# Patient Record
Sex: FEMALE | Race: WHITE | Employment: FULL TIME | URBAN - NONMETROPOLITAN AREA
[De-identification: names, ages, dates, MRNs, and addresses within clinical notes are randomized per-mention and may not be internally consistent; named-entity substitution may affect disease eponyms.]

---

## 2018-10-30 ENCOUNTER — OFFICE VISIT (OUTPATIENT)
Dept: VASCULAR SURGERY | Age: 63
End: 2018-10-30
Payer: MEDICAID

## 2018-10-30 ENCOUNTER — TELEPHONE (OUTPATIENT)
Dept: VASCULAR SURGERY | Age: 63
End: 2018-10-30

## 2018-10-30 VITALS
OXYGEN SATURATION: 98 % | TEMPERATURE: 96 F | HEART RATE: 80 BPM | RESPIRATION RATE: 18 BRPM | SYSTOLIC BLOOD PRESSURE: 132 MMHG | DIASTOLIC BLOOD PRESSURE: 83 MMHG

## 2018-10-30 DIAGNOSIS — I70.1 RENAL ARTERY STENOSIS (HCC): ICD-10-CM

## 2018-10-30 DIAGNOSIS — Z01.818 PRE-OP TESTING: Primary | ICD-10-CM

## 2018-10-30 DIAGNOSIS — Z01.818 PRE-OP TESTING: ICD-10-CM

## 2018-10-30 LAB
ANION GAP SERPL CALCULATED.3IONS-SCNC: 15 MMOL/L (ref 7–19)
BUN BLDV-MCNC: 33 MG/DL (ref 8–23)
CALCIUM SERPL-MCNC: 10.8 MG/DL (ref 8.8–10.2)
CHLORIDE BLD-SCNC: 99 MMOL/L (ref 98–111)
CO2: 24 MMOL/L (ref 22–29)
CREAT SERPL-MCNC: 1.3 MG/DL (ref 0.5–0.9)
GFR NON-AFRICAN AMERICAN: 42
GLUCOSE BLD-MCNC: 119 MG/DL (ref 74–109)
HCT VFR BLD CALC: 40.9 % (ref 37–47)
HEMOGLOBIN: 13 G/DL (ref 12–16)
MCH RBC QN AUTO: 26.5 PG (ref 27–31)
MCHC RBC AUTO-ENTMCNC: 31.8 G/DL (ref 33–37)
MCV RBC AUTO: 83.3 FL (ref 81–99)
PDW BLD-RTO: 14.8 % (ref 11.5–14.5)
PLATELET # BLD: 423 K/UL (ref 130–400)
PMV BLD AUTO: 10.7 FL (ref 9.4–12.3)
POTASSIUM SERPL-SCNC: 4.8 MMOL/L (ref 3.5–5)
RBC # BLD: 4.91 M/UL (ref 4.2–5.4)
SODIUM BLD-SCNC: 138 MMOL/L (ref 136–145)
WBC # BLD: 17.3 K/UL (ref 4.8–10.8)

## 2018-10-30 PROCEDURE — 99204 OFFICE O/P NEW MOD 45 MIN: CPT | Performed by: NURSE PRACTITIONER

## 2018-10-30 RX ORDER — ACETYLCYSTEINE 100 MG/ML
SOLUTION ORAL; RESPIRATORY (INHALATION)
Qty: 1 ML | Refills: 0 | OUTPATIENT
Start: 2018-10-30

## 2018-10-30 RX ORDER — GLIMEPIRIDE 4 MG/1
TABLET ORAL
COMMUNITY
Start: 2018-10-25

## 2018-10-30 RX ORDER — ERGOCALCIFEROL 1.25 MG/1
CAPSULE ORAL
COMMUNITY
Start: 2018-10-11 | End: 2018-10-30 | Stop reason: CLARIF

## 2018-10-30 RX ORDER — METOPROLOL TARTRATE 100 MG/1
100 TABLET ORAL
COMMUNITY

## 2018-10-30 RX ORDER — POTASSIUM CHLORIDE 20 MEQ/1
20 TABLET, EXTENDED RELEASE ORAL 2 TIMES DAILY
COMMUNITY

## 2018-10-30 RX ORDER — TRAZODONE HYDROCHLORIDE 100 MG/1
100 TABLET ORAL
COMMUNITY
End: 2018-10-30 | Stop reason: CLARIF

## 2018-10-30 RX ORDER — FUROSEMIDE 40 MG/1
40 TABLET ORAL
COMMUNITY
End: 2018-10-30 | Stop reason: CLARIF

## 2018-10-30 RX ORDER — MONTELUKAST SODIUM 10 MG/1
10 TABLET ORAL
COMMUNITY

## 2018-10-30 RX ORDER — ISOSORBIDE MONONITRATE 30 MG/1
TABLET, EXTENDED RELEASE ORAL
COMMUNITY
Start: 2018-08-30

## 2018-10-30 RX ORDER — TIZANIDINE 4 MG/1
4 TABLET ORAL
COMMUNITY

## 2018-10-30 RX ORDER — FUROSEMIDE 20 MG/1
TABLET ORAL
COMMUNITY
Start: 2018-08-30

## 2018-10-30 RX ORDER — ZOLPIDEM TARTRATE 10 MG/1
10 TABLET ORAL
COMMUNITY

## 2018-10-30 RX ORDER — CLONIDINE HYDROCHLORIDE 0.2 MG/1
0.2 TABLET ORAL
COMMUNITY

## 2018-10-30 RX ORDER — SPIRONOLACTONE 25 MG/1
25 TABLET ORAL
COMMUNITY

## 2018-10-30 RX ORDER — HYDROCHLOROTHIAZIDE 50 MG/1
37.5 TABLET ORAL
COMMUNITY
End: 2018-10-30 | Stop reason: CLARIF

## 2018-10-30 RX ORDER — DICLOFENAC SODIUM 75 MG/1
TABLET, DELAYED RELEASE ORAL
COMMUNITY
Start: 2018-10-25

## 2018-10-30 RX ORDER — ISOSORBIDE MONONITRATE 60 MG/1
TABLET, EXTENDED RELEASE ORAL
COMMUNITY
Start: 2018-10-25 | End: 2018-10-30 | Stop reason: CLARIF

## 2018-10-30 RX ORDER — LOSARTAN POTASSIUM 100 MG/1
100 TABLET ORAL
COMMUNITY

## 2018-10-30 RX ORDER — ATORVASTATIN CALCIUM 10 MG/1
TABLET, FILM COATED ORAL
COMMUNITY
Start: 2018-10-08

## 2018-10-30 RX ORDER — DULOXETIN HYDROCHLORIDE 30 MG/1
30 CAPSULE, DELAYED RELEASE ORAL
COMMUNITY
End: 2018-10-30 | Stop reason: CLARIF

## 2018-10-30 RX ORDER — ALPRAZOLAM 0.25 MG/1
0.25 TABLET ORAL
COMMUNITY
End: 2018-10-30 | Stop reason: CLARIF

## 2018-10-30 RX ORDER — NIFEDIPINE 90 MG/1
90 TABLET, EXTENDED RELEASE ORAL
COMMUNITY

## 2018-10-31 PROBLEM — I70.1 RENAL ARTERY STENOSIS (HCC): Status: ACTIVE | Noted: 2018-10-31

## 2018-10-31 NOTE — PROGRESS NOTES
Patient Care Team:  Chet Salinas MD as PCP - General      History and Physical    She has a history of hypertension and possible renal artery stenosis for a duration of 1 - 5 years. She presents with gradually worsening hypertension. She is currently on 7 antihypertensive medications. Her current treatment includes none. At present she reports symptoms of blurred vision and headache. Risk factors for atherosclerosis reviewed with the patient include tobacco use, hyperlipidemia, coronary artery disease, hypertension, diabetes, peripheral occlusive disease, family history of ASO, ESRD, O2 use and COPD. Differential diagnosis for hypertension includes but is not limited to essential HTN aldosteronoma, pheochromocytoma, renal artery stenosis, aortic coarctation. Lab Results   Component Value Date    BUN 33 (H) 10/30/2018     Lab Results   Component Value Date    CREATININE 1.3 (H) 10/30/2018         Tim Lozada is a 66 y.o. female with the following history reviewed and recorded in [facility]:  Patient Active Problem List    Diagnosis Date Noted    Renal artery stenosis ([address] 10/31/2018    HTN (hypertension)     Diabetes (HCC)      type 2       Current Outpatient Prescriptions   Medication Sig Dispense Refill    cloNIDine (CATAPRES) 0.2 MG tablet Take 0.2 mg by mouth      losartan (COZAAR) 100 MG tablet Take 100 mg by mouth      metoprolol (LOPRESSOR) 100 MG tablet Take 100 mg by mouth      montelukast (SINGULAIR) 10 MG tablet Take 10 mg by mouth      NIFEdipine (PROCARDIA XL) 90 MG extended release tablet Take 90 mg by mouth      spironolactone (ALDACTONE) 25 MG tablet Take 25 mg by mouth      tiZANidine (ZANAFLEX) 4 MG tablet Take 4 mg by mouth      zolpidem (AMBIEN) 10 MG tablet Take 10 mg by mouth. Arty Reap atorvastatin (LIPITOR) 10 MG tablet       diclofenac (VOLTAREN) 75 MG EC tablet       furosemide (LASIX) 20 MG tablet       glimepiride (AMARYL) 4 MG tablet       isosorbide nausea, or vomiting. Genitourinary - No difficulty urinating, dysuria, frequency, or urgency. No flank pain or hematuria. Musculoskeletal - no back pain, gait disturbance, or myalgia. Skin - no color change, rash, pallor, or new wound. Neurologic - no dizziness, facial asymmetry, or light headedness. No seizures. No speech difficulty or lateralizing weakness. Hematologic - no easy bruising or excessive bleeding. Psychiatric - no severe anxiety or nervousness. No confusion. All other review of systems are negative. Physical Exam    /83 Comment: right  Pulse 80   Temp 96 °F (35.6 °C)   Resp 18   SpO2 98%     Constitutional - well developed, well nourished. No diaphoresis or acute distress. HENT - head normocephalic. Right external ear canal appears normal.  Left external ear canal appears normal.  Septum appears midline. Eyes - conjunctiva normal.  EOMS normal.  No exudate. No icterus. Neck- ROM appears normal, no tracheal deviation. Cardiovascular - Regular rate and rhythm. Heart sounds are normal.  No murmur, rub, or gallop. Carotid pulses are 2+ to palpation bilaterally without bruit. Extremities - Radial and brachial pulses are 2+ to palpation bilaterally. Right femoral pulse: present 2+; Right popliteal pulse: absent Right DP: absent; Right PT absent; Left femoral pulse: present 2+; Left popliteal pulse: absent; Left DP: absent; Left PT: absent No cyanosis, clubbing, or significant edema. No signs atheroembolic event. Pulmonary - effort appears normal.  No respiratory distress. Lungs - Breath sounds normal. No wheezes or rales. GI - Abdomen - soft, non tender, bowel sounds X 4 quadrants. No guarding or rebound tenderness. No distension or palpable mass. Genitourinary - deferred. Musculoskeletal - ROM appears normal.  No significant edema. Neurologic - alert and oriented X 3. Physiologic. Skin - warm, dry, and intact. No rash, erythema, or pallor.   Psychiatric

## 2018-11-08 ENCOUNTER — PREP FOR PROCEDURE (OUTPATIENT)
Dept: VASCULAR SURGERY | Age: 63
End: 2018-11-08

## 2018-11-08 ENCOUNTER — TELEPHONE (OUTPATIENT)
Dept: VASCULAR SURGERY | Age: 63
End: 2018-11-08

## 2018-11-20 ENCOUNTER — TELEPHONE (OUTPATIENT)
Dept: VASCULAR SURGERY | Age: 63
End: 2018-11-20

## 2019-01-09 ENCOUNTER — LAB REQUISITION (OUTPATIENT)
Dept: LAB | Facility: HOSPITAL | Age: 64
End: 2019-01-09

## 2019-01-09 DIAGNOSIS — Z00.00 ENCOUNTER FOR GENERAL ADULT MEDICAL EXAMINATION WITHOUT ABNORMAL FINDINGS: ICD-10-CM

## 2019-01-09 LAB
ALBUMIN SERPL-MCNC: 3.8 G/DL (ref 3.5–5)
ALBUMIN/GLOB SERPL: 1.4 G/DL (ref 1.1–2.5)
ALP SERPL-CCNC: 72 U/L (ref 24–120)
ALT SERPL W P-5'-P-CCNC: 30 U/L (ref 0–54)
ANION GAP SERPL CALCULATED.3IONS-SCNC: 12 MMOL/L (ref 4–13)
AST SERPL-CCNC: 14 U/L (ref 7–45)
BILIRUB SERPL-MCNC: 0.6 MG/DL (ref 0.1–1)
BUN BLD-MCNC: 37 MG/DL (ref 5–21)
BUN/CREAT SERPL: 15.4 (ref 7–25)
CALCIUM SPEC-SCNC: 9.6 MG/DL (ref 8.4–10.4)
CHLORIDE SERPL-SCNC: 100 MMOL/L (ref 98–110)
CO2 SERPL-SCNC: 24 MMOL/L (ref 24–31)
CREAT BLD-MCNC: 2.41 MG/DL (ref 0.5–1.4)
FERRITIN SERPL-MCNC: 65.2 NG/ML (ref 11.1–264)
FOLATE SERPL-MCNC: 14.5 NG/ML
GFR SERPL CREATININE-BSD FRML MDRD: 20 ML/MIN/1.73
GLOBULIN UR ELPH-MCNC: 2.7 GM/DL
GLUCOSE BLD-MCNC: 240 MG/DL (ref 70–100)
IRON 24H UR-MRATE: 69 MCG/DL (ref 42–180)
IRON SATN MFR SERPL: 23 % (ref 20–45)
POTASSIUM BLD-SCNC: 5.3 MMOL/L (ref 3.5–5.3)
PROT SERPL-MCNC: 6.5 G/DL (ref 6.3–8.7)
SODIUM BLD-SCNC: 136 MMOL/L (ref 135–145)
TIBC SERPL-MCNC: 298 MCG/DL (ref 225–420)
VIT B12 BLD-MCNC: 452 PG/ML (ref 239–931)

## 2019-01-09 PROCEDURE — 83550 IRON BINDING TEST: CPT | Performed by: INTERNAL MEDICINE

## 2019-01-09 PROCEDURE — 82746 ASSAY OF FOLIC ACID SERUM: CPT | Performed by: INTERNAL MEDICINE

## 2019-01-09 PROCEDURE — 82607 VITAMIN B-12: CPT | Performed by: INTERNAL MEDICINE

## 2019-01-09 PROCEDURE — 80053 COMPREHEN METABOLIC PANEL: CPT | Performed by: INTERNAL MEDICINE

## 2019-01-09 PROCEDURE — 83540 ASSAY OF IRON: CPT | Performed by: INTERNAL MEDICINE

## 2019-01-09 PROCEDURE — 82728 ASSAY OF FERRITIN: CPT | Performed by: INTERNAL MEDICINE

## 2019-02-20 ENCOUNTER — LAB REQUISITION (OUTPATIENT)
Dept: LAB | Facility: HOSPITAL | Age: 64
End: 2019-02-20

## 2019-02-20 DIAGNOSIS — Z00.00 ENCOUNTER FOR GENERAL ADULT MEDICAL EXAMINATION WITHOUT ABNORMAL FINDINGS: ICD-10-CM

## 2019-02-20 LAB
FERRITIN SERPL-MCNC: 49.9 NG/ML (ref 11.1–264)
IRON 24H UR-MRATE: 74 MCG/DL (ref 42–180)
IRON SATN MFR SERPL: 24 % (ref 20–45)
TIBC SERPL-MCNC: 307 MCG/DL (ref 225–420)

## 2019-02-20 PROCEDURE — 83540 ASSAY OF IRON: CPT | Performed by: INTERNAL MEDICINE

## 2019-02-20 PROCEDURE — 83550 IRON BINDING TEST: CPT | Performed by: INTERNAL MEDICINE

## 2019-02-20 PROCEDURE — 82728 ASSAY OF FERRITIN: CPT | Performed by: INTERNAL MEDICINE

## 2019-04-17 ENCOUNTER — LAB REQUISITION (OUTPATIENT)
Dept: LAB | Facility: HOSPITAL | Age: 64
End: 2019-04-17

## 2019-04-17 DIAGNOSIS — Z00.00 ENCOUNTER FOR GENERAL ADULT MEDICAL EXAMINATION WITHOUT ABNORMAL FINDINGS: ICD-10-CM

## 2019-04-17 LAB
FERRITIN SERPL-MCNC: 62.8 NG/ML (ref 11.1–264)
IRON 24H UR-MRATE: 68 MCG/DL (ref 42–180)
IRON SATN MFR SERPL: 24 % (ref 20–45)
TIBC SERPL-MCNC: 283 MCG/DL (ref 225–420)

## 2019-04-17 PROCEDURE — 83550 IRON BINDING TEST: CPT | Performed by: INTERNAL MEDICINE

## 2019-04-17 PROCEDURE — 82728 ASSAY OF FERRITIN: CPT | Performed by: INTERNAL MEDICINE

## 2019-04-17 PROCEDURE — 83540 ASSAY OF IRON: CPT | Performed by: INTERNAL MEDICINE

## 2019-07-17 ENCOUNTER — LAB REQUISITION (OUTPATIENT)
Dept: LAB | Facility: HOSPITAL | Age: 64
End: 2019-07-17

## 2019-07-17 DIAGNOSIS — Z00.00 ENCOUNTER FOR GENERAL ADULT MEDICAL EXAMINATION WITHOUT ABNORMAL FINDINGS: ICD-10-CM

## 2019-07-17 LAB
FERRITIN SERPL-MCNC: 43.8 NG/ML (ref 11.1–264)
IRON 24H UR-MRATE: 64 MCG/DL (ref 42–180)
IRON SATN MFR SERPL: 20 % (ref 20–45)
TIBC SERPL-MCNC: 314 MCG/DL (ref 225–420)

## 2019-07-17 PROCEDURE — 82728 ASSAY OF FERRITIN: CPT | Performed by: INTERNAL MEDICINE

## 2019-07-17 PROCEDURE — 83550 IRON BINDING TEST: CPT | Performed by: INTERNAL MEDICINE

## 2019-07-17 PROCEDURE — 83540 ASSAY OF IRON: CPT | Performed by: INTERNAL MEDICINE

## 2019-08-07 ENCOUNTER — LAB REQUISITION (OUTPATIENT)
Dept: LAB | Facility: HOSPITAL | Age: 64
End: 2019-08-07

## 2019-08-07 DIAGNOSIS — Z00.00 ENCOUNTER FOR GENERAL ADULT MEDICAL EXAMINATION WITHOUT ABNORMAL FINDINGS: ICD-10-CM

## 2019-08-07 LAB
FERRITIN SERPL-MCNC: 59.7 NG/ML (ref 11.1–264)
IRON 24H UR-MRATE: 68 MCG/DL (ref 42–180)
IRON SATN MFR SERPL: 21 % (ref 20–45)
TIBC SERPL-MCNC: 317 MCG/DL (ref 225–420)

## 2019-08-07 PROCEDURE — 83540 ASSAY OF IRON: CPT | Performed by: INTERNAL MEDICINE

## 2019-08-07 PROCEDURE — 82728 ASSAY OF FERRITIN: CPT | Performed by: INTERNAL MEDICINE

## 2019-08-07 PROCEDURE — 83550 IRON BINDING TEST: CPT | Performed by: INTERNAL MEDICINE

## 2019-09-04 ENCOUNTER — LAB (OUTPATIENT)
Dept: ONCOLOGY | Facility: CLINIC | Age: 64
End: 2019-09-04

## 2019-09-04 DIAGNOSIS — D64.9 ANEMIA, UNSPECIFIED TYPE: ICD-10-CM

## 2019-09-04 DIAGNOSIS — D64.9 ANEMIA, UNSPECIFIED TYPE: Primary | ICD-10-CM

## 2019-09-04 LAB
BASOPHILS # BLD AUTO: 0.11 10*3/MM3 (ref 0–0.2)
BASOPHILS NFR BLD AUTO: 1.2 % (ref 0–1.5)
DEPRECATED RDW RBC AUTO: 40.9 FL (ref 37–54)
EOSINOPHIL # BLD AUTO: 0.28 10*3/MM3 (ref 0–0.4)
EOSINOPHIL NFR BLD AUTO: 3 % (ref 0.3–6.2)
ERYTHROCYTE [DISTWIDTH] IN BLOOD BY AUTOMATED COUNT: 13.5 % (ref 12.3–15.4)
FERRITIN SERPL-MCNC: 59.4 NG/ML (ref 11.1–264)
HCT VFR BLD AUTO: 41.5 % (ref 34–46.6)
HGB BLD-MCNC: 13.4 G/DL (ref 12–15.9)
IMM GRANULOCYTES # BLD AUTO: 0.03 10*3/MM3 (ref 0–0.05)
IMM GRANULOCYTES NFR BLD AUTO: 0.3 % (ref 0–0.5)
IRON 24H UR-MRATE: 72 MCG/DL (ref 42–180)
IRON SATN MFR SERPL: 24 % (ref 20–45)
LYMPHOCYTES # BLD AUTO: 2.09 10*3/MM3 (ref 0.7–3.1)
LYMPHOCYTES NFR BLD AUTO: 22.3 % (ref 19.6–45.3)
MCH RBC QN AUTO: 26.3 PG (ref 26.6–33)
MCHC RBC AUTO-ENTMCNC: 32.3 G/DL (ref 31.5–35.7)
MCV RBC AUTO: 81.5 FL (ref 79–97)
MONOCYTES # BLD AUTO: 0.49 10*3/MM3 (ref 0.1–0.9)
MONOCYTES NFR BLD AUTO: 5.2 % (ref 5–12)
NEUTROPHILS # BLD AUTO: 6.36 10*3/MM3 (ref 1.7–7)
NEUTROPHILS NFR BLD AUTO: 68 % (ref 42.7–76)
PLATELET # BLD AUTO: 326 10*3/MM3 (ref 140–450)
PMV BLD AUTO: 10.3 FL (ref 6–12)
RBC # BLD AUTO: 5.09 10*6/MM3 (ref 3.77–5.28)
TIBC SERPL-MCNC: 301 MCG/DL (ref 225–420)
WBC NRBC COR # BLD: 9.36 10*3/MM3 (ref 3.4–10.8)

## 2019-09-04 PROCEDURE — 83540 ASSAY OF IRON: CPT | Performed by: INTERNAL MEDICINE

## 2019-09-04 PROCEDURE — 82728 ASSAY OF FERRITIN: CPT | Performed by: INTERNAL MEDICINE

## 2019-09-04 PROCEDURE — 85025 COMPLETE CBC W/AUTO DIFF WBC: CPT | Performed by: INTERNAL MEDICINE

## 2019-09-04 PROCEDURE — 83550 IRON BINDING TEST: CPT | Performed by: INTERNAL MEDICINE

## 2019-10-02 ENCOUNTER — TELEPHONE (OUTPATIENT)
Dept: BARIATRICS/WEIGHT MGMT | Facility: CLINIC | Age: 64
End: 2019-10-02

## 2019-10-02 ENCOUNTER — OFFICE VISIT (OUTPATIENT)
Dept: CARDIOLOGY | Facility: CLINIC | Age: 64
End: 2019-10-02

## 2019-10-02 VITALS
HEART RATE: 69 BPM | BODY MASS INDEX: 46.95 KG/M2 | SYSTOLIC BLOOD PRESSURE: 132 MMHG | HEIGHT: 63 IN | DIASTOLIC BLOOD PRESSURE: 83 MMHG | WEIGHT: 265 LBS

## 2019-10-02 DIAGNOSIS — R00.1 BRADYCARDIA, SINUS: ICD-10-CM

## 2019-10-02 DIAGNOSIS — E66.01 CLASS 3 SEVERE OBESITY DUE TO EXCESS CALORIES WITH SERIOUS COMORBIDITY AND BODY MASS INDEX (BMI) OF 45.0 TO 49.9 IN ADULT (HCC): ICD-10-CM

## 2019-10-02 DIAGNOSIS — I10 ESSENTIAL HYPERTENSION: ICD-10-CM

## 2019-10-02 DIAGNOSIS — Z01.818 PRE-OP EVALUATION: Primary | ICD-10-CM

## 2019-10-02 DIAGNOSIS — I44.7 LBBB (LEFT BUNDLE BRANCH BLOCK): ICD-10-CM

## 2019-10-02 PROBLEM — E66.813 CLASS 3 SEVERE OBESITY DUE TO EXCESS CALORIES WITH SERIOUS COMORBIDITY AND BODY MASS INDEX (BMI) OF 45.0 TO 49.9 IN ADULT: Status: ACTIVE | Noted: 2019-10-02

## 2019-10-02 PROCEDURE — 99204 OFFICE O/P NEW MOD 45 MIN: CPT | Performed by: INTERNAL MEDICINE

## 2019-10-02 PROCEDURE — 93000 ELECTROCARDIOGRAM COMPLETE: CPT | Performed by: INTERNAL MEDICINE

## 2019-10-02 RX ORDER — NIFEDIPINE 60 MG/1
60 TABLET, FILM COATED, EXTENDED RELEASE ORAL DAILY
COMMUNITY
Start: 2019-10-01

## 2019-10-02 RX ORDER — SPIRONOLACTONE 25 MG/1
50 TABLET ORAL DAILY
COMMUNITY

## 2019-10-02 RX ORDER — CLONIDINE HYDROCHLORIDE 0.2 MG/1
0.2 TABLET ORAL 3 TIMES DAILY
Refills: 2 | COMMUNITY
Start: 2019-09-23

## 2019-10-02 RX ORDER — ZOLPIDEM TARTRATE 10 MG/1
10 TABLET ORAL
Refills: 1 | COMMUNITY
Start: 2019-09-05

## 2019-10-02 RX ORDER — GLIMEPIRIDE 4 MG/1
4 TABLET ORAL DAILY
Refills: 1 | COMMUNITY
Start: 2019-09-03

## 2019-10-02 RX ORDER — ISOSORBIDE MONONITRATE 60 MG/1
60 TABLET, EXTENDED RELEASE ORAL DAILY
COMMUNITY
Start: 2019-10-01 | End: 2019-10-02

## 2019-10-02 RX ORDER — MONTELUKAST SODIUM 10 MG/1
10 TABLET ORAL DAILY
COMMUNITY
Start: 2019-10-01

## 2019-10-02 RX ORDER — CARVEDILOL 25 MG/1
25 TABLET ORAL 2 TIMES DAILY
Qty: 60 TABLET | Refills: 11 | Status: SHIPPED | OUTPATIENT
Start: 2019-10-02

## 2019-10-02 RX ORDER — METOPROLOL TARTRATE 100 MG/1
100 TABLET ORAL 2 TIMES DAILY WITH MEALS
Refills: 4 | COMMUNITY
Start: 2019-08-12 | End: 2019-10-02 | Stop reason: ALTCHOICE

## 2019-10-02 NOTE — PROGRESS NOTES
"Subjective    Damaris Garcias is a 64 y.o. female. Referred by [facility] ortho for pre-op tkr surgery    History of Present Illness     PRE-OP TKR EVAL:  Had previously received clearance by Dr Azevedo but since has had some variability in her hr and bp with documented high-grade bradycardia without light-headedness. Had been told to stop the Metoprolol but noted big rise in her bp so \"now I'm cutting it in half\". She has a sleep disorder but has never been evaluated for sleep apnea. Is active without change in stamina or cp or unusual sob. EKG shows LBBB. Reports extensive cardiac eval in [facility] 2-3 years ago including ECHO and Cardiac Cath and \"no problems found\".    MORBID OBESE:  This appears to be her #1 health issue and she would like to lose wt but has never had professional help with this. Most other current health issues are complications of this problem. She is interested in referral to the bariatric clinid    HTN:  Is controlled on current regimen and she checks TID at home.    CHRONIC LBBB:  \"this was seen on my very first EKG ever and every one since\"      The following portions of the patient's history were reviewed and updated as appropriate: allergies, current medications, past family history, past medical history, past social history, past surgical history and problem list.    Patient Active Problem List   Diagnosis   • Pre-op evaluation   • Class 3 severe obesity due to excess calories with serious comorbidity and body mass index (BMI) of 45.0 to 49.9 in adult (CMS/[facility])   • LBBB (left bundle branch block)   • Essential hypertension   • Bradycardia, sinus       Allergies   Allergen Reactions   • Cephalexin Shortness Of Breath   • Codeine Shortness Of Breath   • Levaquin [Levofloxacin] Anaphylaxis   • Morphine Shortness Of Breath   • Penicillins Shortness Of Breath   • Aspirin Other (See Comments)   • Cetirizine Dizziness   • Ciprofloxacin Other (See Comments)     Was taking zanaflex at the time and there " is a drug interaction   • Gabapentin Swelling   • Keppra [Levetiracetam] Other (See Comments)     VISION LOSS   • Meloxicam Nausea Only   • Vancomycin Other (See Comments)     Vision loss   • Sulfa Antibiotics Rash       Family History   Problem Relation Age of Onset   • Heart disease Mother    • Heart disease Father        Social History     Socioeconomic History   • Marital status:      Spouse name: Not on file   • Number of children: Not on file   • Years of education: Not on file   • Highest education level: Not on file   Tobacco Use   • Smoking status: Former Smoker     Last attempt to quit:      Years since quittin.7   • Smokeless tobacco: Never Used   Substance and Sexual Activity   • Alcohol use: No     Frequency: Never   • Drug use: No   • Sexual activity: Defer         Current Outpatient Medications:   •  CBD (cannabidiol) oral oil, Take  by mouth 2 (Two) Times a Day., Disp: , Rfl:   •  cloNIDine (CATAPRES) 0.2 MG tablet, Take 0.2 mg by mouth 3 (Three) Times a Day., Disp: , Rfl: 2  •  Ertugliflozin L-PyroglutamicAc (STEGLATRO) 5 MG tablet, Take 5 mg by mouth Every Morning., Disp: , Rfl:   •  glimepiride (AMARYL) 4 MG tablet, Take 4 mg by mouth Daily., Disp: , Rfl: 1  •  montelukast (SINGULAIR) 10 MG tablet, Take 10 mg by mouth Daily., Disp: , Rfl:   •  NIFEdipine CC (ADALAT CC) 60 MG 24 hr tablet, Take 60 mg by mouth Daily., Disp: , Rfl:   •  spironolactone (ALDACTONE) 25 MG tablet, Take 50 mg by mouth Daily., Disp: , Rfl:   •  zolpidem (AMBIEN) 10 MG tablet, Take 10 mg by mouth every night at bedtime., Disp: , Rfl: 1  •  carvedilol (COREG) 25 MG tablet, Take 1 tablet by mouth 2 (Two) Times a Day., Disp: 60 tablet, Rfl: 11    Past Surgical History:   Procedure Laterality Date   • APPENDECTOMY     • CARDIAC CATHETERIZATION     • CHOLECYSTECTOMY     • HYSTERECTOMY     • TUBAL ABDOMINAL LIGATION     • TUMOR REMOVAL         Review of Systems   Constitutional: Positive for fatigue. Negative for  "fever and unexpected weight change.   HENT: Negative for congestion.    Eyes: Negative for visual disturbance.   Respiratory: Positive for shortness of breath. Negative for apnea and wheezing.    Cardiovascular: Negative for chest pain, palpitations and leg swelling.   Gastrointestinal: Negative for abdominal pain and vomiting.   Endocrine: Negative for cold intolerance and heat intolerance.   Genitourinary: Negative for difficulty urinating.   Musculoskeletal: Positive for arthralgias and back pain. Negative for myalgias.   Skin: Negative for rash.   Neurological: Positive for weakness. Negative for syncope and light-headedness.   Hematological: Does not bruise/bleed easily.   Psychiatric/Behavioral: Positive for sleep disturbance.       /83   Pulse 69   Ht 160 cm (63\")   Wt 120 kg (265 lb)   BMI 46.94 kg/m²   Procedures    Objective   Physical Exam   Constitutional: She is oriented to person, place, and time. No distress.   Morbid obese   HENT:   Head: Normocephalic.   Eyes: Pupils are equal, round, and reactive to light.   Neck: No thyromegaly present.   Cardiovascular: Normal rate, regular rhythm and intact distal pulses. Exam reveals no gallop and no friction rub.   Murmur heard.   Systolic murmur is present with a grade of 1/6.  Eusebio at base   Pulmonary/Chest: Effort normal and breath sounds normal. No stridor. No respiratory distress. She has no wheezes. She has no rales.   Abdominal: Soft. Bowel sounds are normal. There is no tenderness. There is no guarding.   Musculoskeletal: She exhibits no edema or tenderness.   Neurological: She is alert and oriented to person, place, and time.   Skin: Skin is warm and dry. She is not diaphoretic.   Psychiatric: She has a normal mood and affect.       Assessment/Plan   Damaris was seen today for surgery risk assessment for tkr, cardiomyopathy and slow heart rate.    Diagnoses and all orders for this visit:    Pre-op evaluation  Comments:  prob low risk but will " obtain further testing  Orders:  -     ECG 12 Lead    Class 3 severe obesity due to excess calories with serious comorbidity and body mass index (BMI) of 45.0 to 49.9 in adult (CMS/[facility])  Comments:  referral to Bariatrics  Orders:  -     Ambulatory Referral to Bariatric Surgery  -     Overnight Sleep Oximetry Study; Future    LBBB (left bundle branch block)  Comments:  chronic and possibly congenital    Essential hypertension  Comments:  controlled - change Metoprolol to Carvedilol and cont home monitoring  Orders:  -     carvedilol (COREG) 25 MG tablet; Take 1 tablet by mouth 2 (Two) Times a Day.    Bradycardia, sinus  Comments:  by report - check Holter, overnight pulse ox  Orders:  -     Holter Monitor - 48 Hour; Future                 Return in about 4 weeks (around 10/30/2019) for Next scheduled follow up with apc.  Orders Placed This Encounter   Procedures   • Ambulatory Referral to Bariatric Surgery     Referral Priority:   Routine     Referral Type:   Consultation     Referral Reason:   Specialty Services Required     Requested Specialty:   Bariatrics     Number of Visits Requested:   1   • Overnight Sleep Oximetry Study     Standing Status:   Future     Standing Expiration Date:   10/1/2020   • Holter Monitor - 48 Hour     Standing Status:   Future     Standing Expiration Date:   10/1/2020     Order Specific Question:   Reason for exam?     Answer:   Other     Order Specific Question:   Other reason?     Answer:   bradycardia   • ECG 12 Lead     Order Specific Question:   Reason for Exam:     Answer:   DIGNA,CARDIOMYOPATHY.

## 2019-10-04 DIAGNOSIS — D64.9 ANEMIA, UNSPECIFIED TYPE: Primary | ICD-10-CM

## 2019-10-09 PROBLEM — N18.30 ANEMIA DUE TO STAGE 3 CHRONIC KIDNEY DISEASE: Status: ACTIVE | Noted: 2019-10-09

## 2019-10-09 PROBLEM — D63.1 ANEMIA DUE TO STAGE 3 CHRONIC KIDNEY DISEASE: Status: ACTIVE | Noted: 2019-10-09

## 2019-10-14 ENCOUNTER — TELEPHONE (OUTPATIENT)
Dept: BARIATRICS/WEIGHT MGMT | Facility: CLINIC | Age: 64
End: 2019-10-14

## 2019-10-24 ENCOUNTER — TELEPHONE (OUTPATIENT)
Dept: BARIATRICS/WEIGHT MGMT | Facility: CLINIC | Age: 64
End: 2019-10-24

## 2019-10-24 ENCOUNTER — LAB (OUTPATIENT)
Dept: ONCOLOGY | Facility: CLINIC | Age: 64
End: 2019-10-24

## 2019-10-24 DIAGNOSIS — D64.9 ANEMIA, UNSPECIFIED TYPE: Primary | ICD-10-CM

## 2019-10-24 DIAGNOSIS — D64.9 ANEMIA, UNSPECIFIED TYPE: ICD-10-CM

## 2019-10-24 LAB
ALBUMIN SERPL-MCNC: 3.8 G/DL (ref 3.5–5.2)
ALBUMIN/GLOB SERPL: 1.3 G/DL
ALP SERPL-CCNC: 88 U/L (ref 39–117)
ALT SERPL W P-5'-P-CCNC: 19 U/L (ref 1–33)
ANION GAP SERPL CALCULATED.3IONS-SCNC: 13 MMOL/L (ref 5–15)
AST SERPL-CCNC: 14 U/L (ref 1–32)
BASOPHILS # BLD AUTO: 0.07 10*3/MM3 (ref 0–0.2)
BASOPHILS NFR BLD AUTO: 0.8 % (ref 0–1.5)
BILIRUB SERPL-MCNC: 0.4 MG/DL (ref 0.2–1.2)
BUN BLD-MCNC: 20 MG/DL (ref 8–23)
BUN/CREAT SERPL: 27.4 (ref 7–25)
CALCIUM SPEC-SCNC: 9.3 MG/DL (ref 8.6–10.5)
CHLORIDE SERPL-SCNC: 98 MMOL/L (ref 98–107)
CO2 SERPL-SCNC: 26 MMOL/L (ref 22–29)
CREAT BLD-MCNC: 0.73 MG/DL (ref 0.57–1)
DEPRECATED RDW RBC AUTO: 42.4 FL (ref 37–54)
EOSINOPHIL # BLD AUTO: 0.23 10*3/MM3 (ref 0–0.4)
EOSINOPHIL NFR BLD AUTO: 2.6 % (ref 0.3–6.2)
ERYTHROCYTE [DISTWIDTH] IN BLOOD BY AUTOMATED COUNT: 13.8 % (ref 12.3–15.4)
FERRITIN SERPL-MCNC: 93.41 NG/ML (ref 13–150)
GFR SERPL CREATININE-BSD FRML MDRD: 81 ML/MIN/1.73
GLOBULIN UR ELPH-MCNC: 3 GM/DL
GLUCOSE BLD-MCNC: 335 MG/DL (ref 65–99)
HCT VFR BLD AUTO: 40.8 % (ref 34–46.6)
HGB BLD-MCNC: 13.1 G/DL (ref 12–15.9)
IMM GRANULOCYTES # BLD AUTO: 0.01 10*3/MM3 (ref 0–0.05)
IMM GRANULOCYTES NFR BLD AUTO: 0.1 % (ref 0–0.5)
IRON 24H UR-MRATE: 57 MCG/DL (ref 37–145)
IRON SATN MFR SERPL: 17 % (ref 20–50)
LYMPHOCYTES # BLD AUTO: 2.55 10*3/MM3 (ref 0.7–3.1)
LYMPHOCYTES NFR BLD AUTO: 28.5 % (ref 19.6–45.3)
MCH RBC QN AUTO: 27 PG (ref 26.6–33)
MCHC RBC AUTO-ENTMCNC: 32.1 G/DL (ref 31.5–35.7)
MCV RBC AUTO: 84 FL (ref 79–97)
MONOCYTES # BLD AUTO: 0.58 10*3/MM3 (ref 0.1–0.9)
MONOCYTES NFR BLD AUTO: 6.5 % (ref 5–12)
NEUTROPHILS # BLD AUTO: 5.51 10*3/MM3 (ref 1.7–7)
NEUTROPHILS NFR BLD AUTO: 61.5 % (ref 42.7–76)
PLATELET # BLD AUTO: 257 10*3/MM3 (ref 140–450)
PMV BLD AUTO: 10.1 FL (ref 6–12)
POTASSIUM BLD-SCNC: 4.2 MMOL/L (ref 3.5–5.2)
PROT SERPL-MCNC: 6.8 G/DL (ref 6–8.5)
RBC # BLD AUTO: 4.86 10*6/MM3 (ref 3.77–5.28)
SODIUM BLD-SCNC: 137 MMOL/L (ref 136–145)
TIBC SERPL-MCNC: 326 MCG/DL (ref 298–536)
TRANSFERRIN SERPL-MCNC: 219 MG/DL (ref 200–360)
WBC NRBC COR # BLD: 8.95 10*3/MM3 (ref 3.4–10.8)

## 2019-10-24 PROCEDURE — 82728 ASSAY OF FERRITIN: CPT | Performed by: INTERNAL MEDICINE

## 2019-10-24 PROCEDURE — 80053 COMPREHEN METABOLIC PANEL: CPT | Performed by: INTERNAL MEDICINE

## 2019-10-24 PROCEDURE — 85025 COMPLETE CBC W/AUTO DIFF WBC: CPT | Performed by: INTERNAL MEDICINE

## 2019-10-24 PROCEDURE — 84466 ASSAY OF TRANSFERRIN: CPT | Performed by: INTERNAL MEDICINE

## 2019-10-24 PROCEDURE — 83540 ASSAY OF IRON: CPT | Performed by: INTERNAL MEDICINE

## 2019-10-24 NOTE — TELEPHONE ENCOUNTER
Patient's new patient packet was returned to our office stating it was not deliverable as addressed. I left her a message to please call the office so I could verify her address.

## 2019-10-24 NOTE — PROGRESS NOTES
[facility]  [address] [place] [address] [address]  [facility] [address]  [phone]    Patient Name: Damaris Garcias  Encounter Date: 10/16/2019  YOB: 1955  Patient Number: [id number]      REASON FOR FOLLOW-UP:  Ms. Damaris Cazares is a pleasant 64-year-old  female who is seen for normocytic anemia from iron deficiency.  She is intolerant to oral iron, severe abdominal cramps.  She had 1 week of oral iron.  She had Nulecit 7.5 months ago.  She is seen alone. History is obtained from the patient.  History is considered to be accurate.      Problem List Items Addressed This Visit        Genitourinary    Anemia due to stage 3 chronic kidney disease (CMS/HCC) - Primary    Overview     DIAGNOSTIC ABNORMALITIES:   CBC 07/24/2018 revealed a WBC of 10.8, hemoglobin 14.3, hematocrit 43.7, MCV 83.4, platelet count 321,000 with normal differential.  CBC 11/05/2018 revealed a WBC of 14.3, hemoglobin 12.19, hematocrit 39.7, MCV 85, platelet count 364,000 with ANC of 8.4, and monocytosis of 0.99.  CBC 11/20/2018 revealed a WBC of 11.3, hemoglobin 11.7, hematocrit 35.6, MCV 85.6, platelet count 313,000 with normal differential.  CBC 11/28/2018 revealed a WBC of 12.9, hemoglobin 11.9, hematocrit 36.5, MCV 84.9, platelet count 371,000 with ANC of 7.9.  CBC  12/10/2018 revealed a WBC of 11.1, hemoglobin 11.9, hematocrit 37.1, MCV 85.7, platelet count 365,000 with normal differential.      PREVIOUS INTERVENTIONS:  Ferrous sulfate 325 mg 02/12/2019 through 02/18/2019.  Stopped due to severe abdominal cramps.  Nulecit 125 mg 05/05/2019 and 03/12/2019 at [facility].   Third dose cancelled due to flu.         Relevant Orders    Occult Blood X 3, Stool - Stool, Per Rectum    CBC & Differential    Comprehensive Metabolic Panel    Ferritin    Iron Profile         No history exists.       PAST MEDICAL HISTORY:  ALLERGIES:  Allergies   Allergen Reactions   •  Cephalexin Shortness Of Breath   • Codeine Shortness Of Breath   • Levaquin [Levofloxacin] Anaphylaxis   • Morphine Shortness Of Breath   • Penicillins Shortness Of Breath   • Aspirin Other (See Comments)   • Cetirizine Dizziness   • Ciprofloxacin Other (See Comments)     Was taking zanaflex at the time and there is a drug interaction   • Gabapentin Swelling   • Keppra [Levetiracetam] Other (See Comments)     VISION LOSS   • Meloxicam Nausea Only   • Vancomycin Other (See Comments)     Vision loss   • Sulfa Antibiotics Rash     CURRENT MEDICATIONS:  Outpatient Encounter Medications as of 10/30/2019   Medication Sig Dispense Refill   • carvedilol (COREG) 25 MG tablet Take 1 tablet by mouth 2 (Two) Times a Day. 60 tablet 11   • CBD (cannabidiol) oral oil Take  by mouth 2 (Two) Times a Day.     • cloNIDine (CATAPRES) 0.2 MG tablet Take 0.2 mg by mouth 3 (Three) Times a Day.  2   • Ertugliflozin L-PyroglutamicAc (STEGLATRO) 5 MG tablet Take 5 mg by mouth Every Morning.     • glimepiride (AMARYL) 4 MG tablet Take 4 mg by mouth Daily.  1   • montelukast (SINGULAIR) 10 MG tablet Take 10 mg by mouth Daily.     • NIFEdipine CC (ADALAT CC) 60 MG 24 hr tablet Take 60 mg by mouth Daily.     • spironolactone (ALDACTONE) 25 MG tablet Take 50 mg by mouth Daily.     • zolpidem (AMBIEN) 10 MG tablet Take 10 mg by mouth every night at bedtime.  1     No facility-administered encounter medications on file as of 10/30/2019.      ADULT ILLNESSES:  Patient Active Problem List   Diagnosis Code   • Pre-op evaluation Z01.818   • Class 3 severe obesity due to excess calories with serious comorbidity and body mass index (BMI) of 45.0 to 49.9 in adult (CMS/[facility]) E66.01, Z68.42   • LBBB (left bundle branch block) I44.7   • Essential hypertension I10   • Bradycardia, sinus R00.1   • Anemia due to stage 3 chronic kidney disease (CMS/[facility]) N18.3, D63.1     SURGERIES:  Past Surgical History:   Procedure Laterality Date   • APPENDECTOMY     • CARDIAC  CATHETERIZATION     • CHOLECYSTECTOMY     • HYSTERECTOMY     • TUBAL ABDOMINAL LIGATION     • TUMOR REMOVAL       HEALTH MAINTENANCE ITEMS:  Health Maintenance Due   Topic Date Due   • MAMMOGRAM  1955   • ANNUAL PHYSICAL  1960   • PNEUMOCOCCAL VACCINE (19-64 MEDIUM RISK) (1 of 1 - PPSV23) 1976   • TDAP/TD VACCINES (1 - Tdap) 1976   • ZOSTER VACCINE (1 of 2) 2007   • INFLUENZA VACCINE  2019   • HEPATITIS C SCREENING  2019   • PAP SMEAR  2019   • COLONOSCOPY  2019       <no information>  Last Completed Colonoscopy       Status Date      COLONOSCOPY No completions recorded          There is no immunization history on file for this patient.  Last Completed Mammogram       Status Date      MAMMOGRAM No completions recorded            FAMILY HISTORY:  Family History   Problem Relation Age of Onset   • Heart disease Mother    • Heart disease Father      SOCIAL HISTORY:  Social History     Socioeconomic History   • Marital status:      Spouse name: Not on file   • Number of children: Not on file   • Years of education: Not on file   • Highest education level: Not on file   Tobacco Use   • Smoking status: Former Smoker     Last attempt to quit:      Years since quittin.8   • Smokeless tobacco: Never Used   Substance and Sexual Activity   • Alcohol use: No     Frequency: Never   • Drug use: No   • Sexual activity: Defer       REVIEW OF SYSTEMS:    Review of Systems   Constitutional: Negative for chills, diaphoresis, fatigue and fever.   HENT: Negative for congestion, mouth sores, nosebleeds and trouble swallowing.    Eyes: Negative for double vision, redness and visual disturbance.   Respiratory: Negative for chest tightness, shortness of breath, wheezing and stridor.    Cardiovascular: Negative for chest pain, palpitations and leg swelling.   Gastrointestinal: Negative for abdominal distention, abdominal pain, blood in stool, constipation, diarrhea, nausea  "and vomiting.   Endocrine: Negative for cold intolerance and heat intolerance.   Genitourinary: Negative for breast lump, difficulty urinating, flank pain, hematuria and vaginal bleeding.   Musculoskeletal: Negative for back pain and joint swelling.        \"Right knee joint hurts when I walk.\"   Skin: Negative for pallor and skin lesions.   Allergic/Immunologic: Positive for food allergies.        \"I can't have blue cheese.\"   Neurological: Negative for seizures, facial asymmetry, speech difficulty and confusion.   Hematological: Negative for adenopathy. Does not bruise/bleed easily.   Psychiatric/Behavioral: Negative for agitation, behavioral problems and hallucinations. The patient is not nervous/anxious.        VITAL SIGNS: /64   Pulse 64   Temp 97.4 °F (36.3 °C)   Resp 18   Ht 160 cm (63\")   Wt 120 kg (264 lb)   SpO2 98%   Breastfeeding? No   BMI 46.77 kg/m²  Body surface area is 2.18 meters squared.   Pain Score    10/30/19 1332   PainSc: 0-No pain       PHYSICAL EXAMINATION:     Physical Exam   Constitutional: She is oriented to person, place, and time. She appears well-developed and well-nourished. No distress.   HENT:   Head: Normocephalic and atraumatic.   Eyes: EOM are normal. Pupils are equal, round, and reactive to light. No scleral icterus.   Neck: Trachea normal. Neck supple.   Cardiovascular: Normal rate, regular rhythm and normal pulses.   No murmur heard.  Pulmonary/Chest: Effort normal and breath sounds normal. She has no wheezes. She has no rhonchi. She has no rales. Chest wall is not dull to percussion.   Abdominal: Soft. Normal appearance and bowel sounds are normal. She exhibits no distension. There is no tenderness. There is no rebound and no guarding.   Belly is obese.   Musculoskeletal: She exhibits no edema.   Lymphadenopathy:     She has no cervical adenopathy.     She has no axillary adenopathy.        Right: No inguinal and no supraclavicular adenopathy present.        " Left: No inguinal and no supraclavicular adenopathy present.   Neurological: She is alert and oriented to person, place, and time. She has normal strength. No sensory deficit.   Skin: Skin is warm and dry. She is not diaphoretic. No pallor.   Psychiatric: She has a normal mood and affect. Her behavior is normal. Judgment and thought content normal.   Vitals reviewed.      LABS    Lab Results - Last 18 Months   Lab Units 10/24/19  1355 09/04/19  1309 10/30/18  1210   HEMOGLOBIN g/dL 13.1 13.4 13.0   HEMATOCRIT % 40.8 41.5 40.9   MCV fL 84.0 81.5 83.3   WBC 10*3/mm3 8.95 9.36 17.3*   RDW % 13.8 13.5 14.8*   MPV fL 10.1 10.3 10.7   PLATELETS 10*3/mm3 257 326 423*   IMM GRAN % % 0.1 0.3  --    NEUTROS ABS 10*3/mm3 5.51 6.36  --    LYMPHS ABS 10*3/mm3 2.55 2.09  --    MONOS ABS 10*3/mm3 0.58 0.49  --    EOS ABS 10*3/mm3 0.23 0.28  --    BASOS ABS 10*3/mm3 0.07 0.11  --    IMMATURE GRANS (ABS) 10*3/mm3 0.01 0.03  --        Lab Results - Last 18 Months   Lab Units 10/24/19  1355 01/09/19  1600 10/30/18  1210   GLUCOSE mg/dL 335* 240* 119*   SODIUM mmol/L 137 136 138   POTASSIUM mmol/L 4.2 5.3 4.8   TOTAL CO2 mmol/L  --   --  24   CO2 mmol/L 26.0 24.0  --    CHLORIDE mmol/L 98 100 99   ANION GAP mmol/L 13.0 12.0 15   CREATININE mg/dL 0.73 2.41* 1.3*   BUN mg/dL 20 37* 33*   BUN / CREAT RATIO  27.4* 15.4  --    CALCIUM mg/dL 9.3 9.6 10.8*   EGFR IF NONAFRICN AM mL/min/1.73 81 20* 42*   ALK PHOS U/L 88 72  --    TOTAL PROTEIN g/dL 6.8 6.5  --    ALT (SGPT) U/L 19 30  --    AST (SGOT) U/L 14 14  --    BILIRUBIN mg/dL 0.4 0.6  --    ALBUMIN g/dL 3.80 3.80  --    GLOBULIN gm/dL 3.0 2.7  --        No results for input(s): MSPIKE, KAPPALAMB, IGLFLC, URICACID, FREEKAPPAL, CEA, LDH, REFLABREPO in the last 26607 hours.    Lab Results - Last 18 Months   Lab Units 10/24/19  1355 09/04/19  1309 08/07/19  1700 07/17/19  1600 04/17/19  1700 02/20/19  1600 01/09/19  1600   IRON mcg/dL 57 72 68 64 68 74 69   TIBC mcg/dL 326 301 317 314 729  307 298   IRON SATURATION % 17* 24 21 20 24 24 23   FERRITIN ng/mL 93.41 59.40 59.70 43.80 62.80 49.90 65.20   FOLATE ng/mL  --   --   --   --   --   --  14.50         Damaris Garcias reports a pain score of 0.        Patient's Body mass index is 46.77 kg/m². BMI is above normal parameters. Recommendations include: referral to primary care.      ASSESSMENT:  1.    Microcytic anemia from iron deficiency and chronic kidney disease stage IV, GFR 20 ml/minute 01/09/2019.  2.    Chronic kidney disease stage IV, GFR 20 ml/minute 01/09/2019.  3.    Iron deficiency, intolerant to oral iron.  Abdominal cramps.   4.    Hypertension.  5.    Peripheral neuropathy from diabetes.  6.    Leukocytosis entertaining reactive process. Not apparent 01/09/2019      PLAN:  1.     Re:  Note of Dr. Mensah 10/02/2019.  Clearance before total knee replacement, right.   2.     Re:  Heme status. Unremarkable.  3.     Re:  Pre office CMP.  Glucose 335 mg/dl and GFR 81 ml/minute.    4.     Re:  Pre office ferritin, TIBC, % saturation, and iron.  17% saturation and ferritin 93.41.   5.    Stool for occult blood x3.   6.    CBC with differential every 8 weeks.  7.    Blood for ferritin, TIBC, % saturation, and iron every 8 weeks.  8.    Continue currently identified medications.  9.    Continue ongoing management per primary care physician and other specialists.  10.  Plan of care discussed with patient.  Understanding expressed.  Patient agreeable to proceed.  11.  Procrit 40,000 units subcutaneously every week if hemoglobin less than 10 and hematocrit less than 30% to target a hemoglobin of 11 and hematocrit of 33% if GFR below 60.  Move CBC with differential every week if she starts Procrit.  Observe for adverse events.   12.  Refer to PCP for obesity BMI 46.7.   12.  Return to the office in 4 months with pre-office CMP.    13.  For the use of ESAs:   the patient about the appropriate use of ESAs for patients  with cancer, MDS, and CKD.  Acknowledge that the AB risk: benefit discussion occurred using the AB [facility] Patient and Healthcare Professional (HCP) Acknowledgement Form.  Assure signature on the form.  Acknowledge government oversight and intervention in the care of the individual patient.      TIME SPENT:  Face to face time on this encounter,as defined by the American Medical Association in the 2019 Current Procedural Terminology codebook; assessment, record review, lab review, planning and education is 31 minutes.      cc: MD Virginia Fried APRN (referring)         Franklyn Mensah MD

## 2019-10-30 ENCOUNTER — OFFICE VISIT (OUTPATIENT)
Dept: ONCOLOGY | Facility: CLINIC | Age: 64
End: 2019-10-30

## 2019-10-30 VITALS
TEMPERATURE: 97.4 F | HEART RATE: 64 BPM | DIASTOLIC BLOOD PRESSURE: 64 MMHG | SYSTOLIC BLOOD PRESSURE: 128 MMHG | RESPIRATION RATE: 18 BRPM | WEIGHT: 264 LBS | BODY MASS INDEX: 46.78 KG/M2 | HEIGHT: 63 IN | OXYGEN SATURATION: 98 %

## 2019-10-30 DIAGNOSIS — N18.30 ANEMIA DUE TO STAGE 3 CHRONIC KIDNEY DISEASE (HCC): Primary | ICD-10-CM

## 2019-10-30 DIAGNOSIS — D63.1 ANEMIA DUE TO STAGE 3 CHRONIC KIDNEY DISEASE (HCC): Primary | ICD-10-CM

## 2019-10-30 PROCEDURE — 99214 OFFICE O/P EST MOD 30 MIN: CPT | Performed by: INTERNAL MEDICINE

## 2019-11-14 ENCOUNTER — OFFICE VISIT (OUTPATIENT)
Dept: CARDIOLOGY | Facility: CLINIC | Age: 64
End: 2019-11-14

## 2019-11-14 DIAGNOSIS — Z53.21 PATIENT LEFT WITHOUT BEING SEEN: Primary | ICD-10-CM

## 2019-11-15 NOTE — PROGRESS NOTES
Patient had to be rescheduled due to holter monitor not being placed on patient at last office visit

## 2019-12-23 ENCOUNTER — TELEPHONE (OUTPATIENT)
Dept: ONCOLOGY | Facility: CLINIC | Age: 64
End: 2019-12-23

## 2020-02-12 NOTE — PROGRESS NOTES
[facility] ONCOLOGY  [address] [place] [address] [address]  [facility] [address]  [phone]    Patient Name: Damaris Garcias  Encounter Date: 02/19/2020  YOB: 1955  Patient Number: [id number]      REASON FOR FOLLOW-UP: Ms. Damaris Cazares is a pleasant 64-year-old  female who is seen for normocytic anemia from iron deficiency.  She is intolerant to oral iron, severe abdominal cramps.  She had 1 week of oral iron.  She had Nulecit 11 months ago.  She is seen alone. History is obtained from the patient.  History is considered to be accurate.      Problem List Items Addressed This Visit        Genitourinary    Anemia due to stage 3 chronic kidney disease (CMS/HCC) - Primary    Overview     DIAGNOSTIC ABNORMALITIES:   CBC 07/24/2018 revealed a WBC of 10.8, hemoglobin 14.3, hematocrit 43.7, MCV 83.4, platelet count 321,000 with normal differential.  CBC 11/05/2018 revealed a WBC of 14.3, hemoglobin 12.19, hematocrit 39.7, MCV 85, platelet count 364,000 with ANC of 8.4, and monocytosis of 0.99.  CBC 11/20/2018 revealed a WBC of 11.3, hemoglobin 11.7, hematocrit 35.6, MCV 85.6, platelet count 313,000 with normal differential.  CBC 11/28/2018 revealed a WBC of 12.9, hemoglobin 11.9, hematocrit 36.5, MCV 84.9, platelet count 371,000 with ANC of 7.9.  CBC  12/10/2018 revealed a WBC of 11.1, hemoglobin 11.9, hematocrit 37.1, MCV 85.7, platelet count 365,000 with normal differential.      PREVIOUS INTERVENTIONS:  Ferrous sulfate 325 mg 02/12/2019 through 02/18/2019.  Stopped due to severe abdominal cramps.  Nulecit 125 mg 05/05/2019 and 03/12/2019 at [facility].   Third dose cancelled due to flu.              No history exists.       PAST MEDICAL HISTORY:  ALLERGIES:  Allergies   Allergen Reactions   • Cephalexin Shortness Of Breath   • Codeine Shortness Of Breath   • Levaquin [Levofloxacin] Anaphylaxis   • Morphine Shortness Of Breath   • Penicillins  Shortness Of Breath   • Aspirin Other (See Comments)   • Cetirizine Dizziness   • Ciprofloxacin Other (See Comments)     Was taking zanaflex at the time and there is a drug interaction   • Gabapentin Swelling   • Keppra [Levetiracetam] Other (See Comments)     VISION LOSS   • Meloxicam Nausea Only   • Vancomycin Other (See Comments)     Vision loss   • Sulfa Antibiotics Rash     CURRENT MEDICATIONS:  Outpatient Encounter Medications as of 2/19/2020   Medication Sig Dispense Refill   • carvedilol (COREG) 25 MG tablet Take 1 tablet by mouth 2 (Two) Times a Day. 60 tablet 11   • CBD (cannabidiol) oral oil Take  by mouth 2 (Two) Times a Day.     • cloNIDine (CATAPRES) 0.2 MG tablet Take 0.2 mg by mouth 3 (Three) Times a Day.  2   • Ertugliflozin L-PyroglutamicAc (STEGLATRO) 5 MG tablet Take 5 mg by mouth Every Morning.     • glimepiride (AMARYL) 4 MG tablet Take 4 mg by mouth Daily.  1   • montelukast (SINGULAIR) 10 MG tablet Take 10 mg by mouth Daily.     • NIFEdipine CC (ADALAT CC) 60 MG 24 hr tablet Take 60 mg by mouth Daily.     • spironolactone (ALDACTONE) 25 MG tablet Take 50 mg by mouth Daily.     • TiZANidine (ZANAFLEX) 2 MG capsule Take 2 mg by mouth 3 (Three) Times a Day.     • zolpidem (AMBIEN) 10 MG tablet Take 10 mg by mouth every night at bedtime.  1     No facility-administered encounter medications on file as of 2/19/2020.      ADULT ILLNESSES:  Patient Active Problem List   Diagnosis Code   • Pre-op evaluation Z01.818   • Class 3 severe obesity due to excess calories with serious comorbidity and body mass index (BMI) of 45.0 to 49.9 in adult (CMS/[facility]) E66.01, Z68.42   • LBBB (left bundle branch block) I44.7   • Essential hypertension I10   • Bradycardia, sinus R00.1   • Anemia due to stage 3 chronic kidney disease (CMS/[facility]) N18.3, D63.1     SURGERIES:  Past Surgical History:   Procedure Laterality Date   • APPENDECTOMY     • CARDIAC CATHETERIZATION     • CHOLECYSTECTOMY     • HYSTERECTOMY     • TUBAL  "ABDOMINAL LIGATION     • TUMOR REMOVAL       HEALTH MAINTENANCE ITEMS:  Health Maintenance Due   Topic Date Due   • MAMMOGRAM  1955   • ANNUAL PHYSICAL  1960   • TDAP/TD VACCINES (1 - Tdap) 1968   • PNEUMOCOCCAL VACCINE (19-64 HIGHEST RISK) (1 of 3 - PCV13) 1976   • ZOSTER VACCINE (1 of 2) 2007   • INFLUENZA VACCINE  2019   • HEPATITIS C SCREENING  2019   • PAP SMEAR  2019   • COLONOSCOPY  2019       <no information>  Last Completed Colonoscopy       Status Date      COLONOSCOPY No completions recorded          There is no immunization history on file for this patient.  Last Completed Mammogram       Status Date      MAMMOGRAM No completions recorded            FAMILY HISTORY:  Family History   Problem Relation Age of Onset   • Heart disease Mother    • Heart disease Father      SOCIAL HISTORY:  Social History     Socioeconomic History   • Marital status:      Spouse name: Not on file   • Number of children: Not on file   • Years of education: Not on file   • Highest education level: Not on file   Tobacco Use   • Smoking status: Former Smoker     Last attempt to quit:      Years since quittin.1   • Smokeless tobacco: Never Used   Substance and Sexual Activity   • Alcohol use: No     Frequency: Never   • Drug use: No   • Sexual activity: Defer       REVIEW OF SYSTEMS:    Review of Systems   Constitutional: Negative for activity change, appetite change, chills, diaphoresis, fatigue, fever and unexpected weight loss.        \"I feel good.\"   HENT: Negative for congestion, ear discharge, ear pain, hearing loss, nosebleeds, sinus pressure, sore throat and trouble swallowing.    Eyes: Negative for blurred vision, pain and visual disturbance.   Respiratory: Negative for cough, shortness of breath and wheezing.    Cardiovascular: Negative for chest pain, palpitations and leg swelling.   Gastrointestinal: Negative for abdominal pain, blood in stool, " "constipation, diarrhea, nausea and vomiting.   Endocrine: Negative for cold intolerance and heat intolerance.   Genitourinary: Negative for breast lump, difficulty urinating, dysuria, frequency, hematuria, urgency and vaginal bleeding.   Musculoskeletal: Negative for arthralgias and neck pain.   Skin: Negative for pallor.   Allergic/Immunologic: Positive for food allergies.        \"Blue cheese.\"   Neurological: Negative for dizziness, tremors, seizures, syncope, speech difficulty, weakness, headache and confusion.   Hematological: Negative for adenopathy. Does not bruise/bleed easily.   Psychiatric/Behavioral: Negative for agitation, dysphoric mood, sleep disturbance, suicidal ideas and depressed mood. The patient is not nervous/anxious.        VITAL SIGNS: /84   Pulse 65   Temp 97.6 °F (36.4 °C)   Resp 18   Ht 160 cm (63\")   Wt 119 kg (262 lb)   SpO2 98%   Breastfeeding No   BMI 46.41 kg/m²  Body surface area is 2.17 meters squared.   Pain Score    02/19/20 1328   PainSc: 0-No pain         PHYSICAL EXAMINATION:     Physical Exam   Constitutional: She is oriented to person, place, and time. She appears well-developed and well-nourished. No distress.   HENT:   Head: Normocephalic and atraumatic.   Eyes: EOM are normal. No scleral icterus.   Neck: Trachea normal. Neck supple.   Cardiovascular: Normal rate, regular rhythm and normal pulses.   No murmur heard.  Pulmonary/Chest: Effort normal and breath sounds normal. She has no wheezes. She has no rhonchi. She has no rales. Chest wall is not dull to percussion.   Abdominal: Soft. Normal appearance and bowel sounds are normal. There is no tenderness. There is no rebound and no guarding.   Musculoskeletal: She exhibits no edema.   Lymphadenopathy:     She has no cervical adenopathy.     She has no axillary adenopathy.        Right: No inguinal and no supraclavicular adenopathy present.        Left: No inguinal and no supraclavicular adenopathy present. "   Neurological: She is alert and oriented to person, place, and time. She has normal strength. No sensory deficit.   Skin: Skin is dry. She is not diaphoretic. No pallor.   Psychiatric: She has a normal mood and affect. Her behavior is normal. Judgment and thought content normal.   Vitals reviewed.      LABS    Lab Results - Last 18 Months   Lab Units 02/18/20  1313 10/24/19  1355 09/04/19  1309 10/30/18  1210   HEMOGLOBIN g/dL 13.7 13.1 13.4 13.0   HEMATOCRIT % 43.2 40.8 41.5 40.9   MCV fL 83.4 84.0 81.5 83.3   WBC 10*3/mm3 7.82 8.95 9.36 17.3*   RDW % 13.1 13.8 13.5 14.8*   MPV fL 10.6 10.1 10.3 10.7   PLATELETS 10*3/mm3 336 257 326 423*   IMM GRAN % % 0.1 0.1 0.3  --    NEUTROS ABS 10*3/mm3 4.81 5.51 6.36  --    LYMPHS ABS 10*3/mm3 2.00 2.55 2.09  --    MONOS ABS 10*3/mm3 0.50 0.58 0.49  --    EOS ABS 10*3/mm3 0.38 0.23 0.28  --    BASOS ABS 10*3/mm3 0.12 0.07 0.11  --    IMMATURE GRANS (ABS) 10*3/mm3 0.01 0.01 0.03  --        Lab Results - Last 18 Months   Lab Units 02/18/20  1313 10/24/19  1355 01/09/19  1600 10/30/18  1210   GLUCOSE mg/dL 320* 335* 240* 119*   SODIUM mmol/L 134* 137 136 138   POTASSIUM mmol/L 5.2 4.2 5.3 4.8   TOTAL CO2 mmol/L  --   --   --  24   CO2 mmol/L 25.0 26.0 24.0  --    CHLORIDE mmol/L 98 98 100 99   ANION GAP mmol/L 11.0 13.0 12.0 15   CREATININE mg/dL 0.79 0.73 2.41* 1.3*   BUN mg/dL 20 20 37* 33*   BUN / CREAT RATIO  25.3* 27.4* 15.4  --    CALCIUM mg/dL 9.4 9.3 9.6 10.8*   EGFR IF NONAFRICN AM mL/min/1.73 74 81 20* 42*   ALK PHOS U/L 105 88 72  --    TOTAL PROTEIN g/dL 7.0 6.8 6.5  --    ALT (SGPT) U/L 14 19 30  --    AST (SGOT) U/L 11 14 14  --    BILIRUBIN mg/dL 0.4 0.4 0.6  --    ALBUMIN g/dL 3.90 3.80 3.80  --    GLOBULIN gm/dL 3.1 3.0 2.7  --        No results for input(s): MSPIKE, KAPPALAMB, IGLFLC, URICACID, FREEKAPPAL, CEA, LDH, REFLABREPO in the last 11012 hours.    Lab Results - Last 18 Months   Lab Units 02/18/20  1313 10/24/19  1355 09/04/19  1309 08/07/19  1700  07/17/19  1600 04/17/19  1700  01/09/19  1600   IRON mcg/dL 65 57 72 68 64 68   < > 69   TIBC mcg/dL 346 326 301 317 314 283   < > 298   IRON SATURATION % 19* 17* 24 21 20 24   < > 23   FERRITIN ng/mL 60.60 93.41 59.40 59.70 43.80 62.80   < > 65.20   FOLATE ng/mL  --   --   --   --   --   --   --  14.50    < > = values in this interval not displayed.         Damaris Katerine reports a pain score of 0.     Patient's Body mass index is 46.41 kg/m². BMI is above normal parameters. Recommendations include: referral to primary care.      ASSESSMENT:  1.    Microcytic anemia from iron deficiency and chronic kidney disease stage IV, GFR 20 ml/minute 01/09/2019.  2.    Chronic kidney disease stage IV, GFR 20 ml/minute 01/09/2019.  3.    Iron deficiency, intolerant to oral iron.  Abdominal cramps.   4.    Hypertension.  5.    Peripheral neuropathy from diabetes.  6.    Leukocytosis entertaining reactive process. Not apparent 01/09/2019.  7.    Obesity BMI 46.4.       PLAN:  1.     Re:  No show for laboratory 12/23/2019.  2.     Re:  Heme status.  Hemoglobin 13.7 gm.  3.     Re:  Pre office CMP. Glucose 320 mg/dl and GFR 74 ml/minute.  4.     Re:  Pre office ferritin, TIBC, % saturation, and iron.  Ferritin 60.6 ng/ml and saturation 19.  Hold iron replacement, not anemic.  5.    Stool for occult blood x3.   6.    CBC with differential every 8 weeks.  7.    Blood for ferritin, TIBC, % saturation, and iron every 8 weeks.  8.    Continue currently identified medications.  9.    Continue ongoing management per primary care physician and other specialists.  10.  Plan of care discussed with patient.  Understanding expressed.  Patient agreeable to proceed.  11.  Procrit 40,000 units subcutaneously every week if hemoglobin less than 10 and hematocrit less than 30% to target a hemoglobin of 11 and hematocrit of 33% if GFR below 60.  Move CBC with differential every week if she starts Procrit.  Observe for side  effects.   12.  Refer to PCP for obesity.   13.  Return to the office in 4 months with pre-office CMP.   14.  For the use of ESAs:   the patient about the appropriate use of ESAs for patients with cancer, MDS, and CKD.  Acknowledge that the AB risk: benefit discussion occurred using the AB [facility] Patient and Healthcare Professional (HCP) Acknowledgement Form.  Assure signature on the form.  Acknowledge government oversight and intervention in the care of the individual patient.          TIME SPENT:  Face to face time on this encounter,as defined by the American Medical Association in the 2020 Current Procedural Terminology codebook; assessment, record review, lab review, planning and education is 26 minutes.        cc: MD Virginia Fried APRN        (Franklyn Mensah MD)

## 2020-02-18 ENCOUNTER — LAB (OUTPATIENT)
Dept: ONCOLOGY | Facility: CLINIC | Age: 65
End: 2020-02-18

## 2020-02-18 DIAGNOSIS — D63.1 ANEMIA DUE TO STAGE 3 CHRONIC KIDNEY DISEASE (HCC): ICD-10-CM

## 2020-02-18 DIAGNOSIS — N18.30 ANEMIA DUE TO STAGE 3 CHRONIC KIDNEY DISEASE (HCC): ICD-10-CM

## 2020-02-18 LAB
ALBUMIN SERPL-MCNC: 3.9 G/DL (ref 3.5–5.2)
ALBUMIN/GLOB SERPL: 1.3 G/DL
ALP SERPL-CCNC: 105 U/L (ref 39–117)
ALT SERPL W P-5'-P-CCNC: 14 U/L (ref 1–33)
ANION GAP SERPL CALCULATED.3IONS-SCNC: 11 MMOL/L (ref 5–15)
AST SERPL-CCNC: 11 U/L (ref 1–32)
BILIRUB SERPL-MCNC: 0.4 MG/DL (ref 0.2–1.2)
BUN BLD-MCNC: 20 MG/DL (ref 8–23)
BUN/CREAT SERPL: 25.3 (ref 7–25)
CALCIUM SPEC-SCNC: 9.4 MG/DL (ref 8.6–10.5)
CHLORIDE SERPL-SCNC: 98 MMOL/L (ref 98–107)
CO2 SERPL-SCNC: 25 MMOL/L (ref 22–29)
CREAT BLD-MCNC: 0.79 MG/DL (ref 0.57–1)
FERRITIN SERPL-MCNC: 60.6 NG/ML (ref 13–150)
GFR SERPL CREATININE-BSD FRML MDRD: 74 ML/MIN/1.73
GLOBULIN UR ELPH-MCNC: 3.1 GM/DL
GLUCOSE BLD-MCNC: 320 MG/DL (ref 65–99)
IRON 24H UR-MRATE: 65 MCG/DL (ref 37–145)
IRON SATN MFR SERPL: 19 % (ref 20–50)
POTASSIUM BLD-SCNC: 5.2 MMOL/L (ref 3.5–5.2)
PROT SERPL-MCNC: 7 G/DL (ref 6–8.5)
SODIUM BLD-SCNC: 134 MMOL/L (ref 136–145)
TIBC SERPL-MCNC: 346 MCG/DL (ref 298–536)
TRANSFERRIN SERPL-MCNC: 232 MG/DL (ref 200–360)

## 2020-02-18 PROCEDURE — 83540 ASSAY OF IRON: CPT | Performed by: INTERNAL MEDICINE

## 2020-02-18 PROCEDURE — 82728 ASSAY OF FERRITIN: CPT | Performed by: INTERNAL MEDICINE

## 2020-02-18 PROCEDURE — 84466 ASSAY OF TRANSFERRIN: CPT | Performed by: INTERNAL MEDICINE

## 2020-02-18 PROCEDURE — 85025 COMPLETE CBC W/AUTO DIFF WBC: CPT | Performed by: INTERNAL MEDICINE

## 2020-02-18 PROCEDURE — 80053 COMPREHEN METABOLIC PANEL: CPT | Performed by: INTERNAL MEDICINE

## 2020-02-19 ENCOUNTER — OFFICE VISIT (OUTPATIENT)
Dept: ONCOLOGY | Facility: CLINIC | Age: 65
End: 2020-02-19

## 2020-02-19 ENCOUNTER — TELEPHONE (OUTPATIENT)
Dept: ONCOLOGY | Facility: CLINIC | Age: 65
End: 2020-02-19

## 2020-02-19 VITALS
TEMPERATURE: 97.6 F | BODY MASS INDEX: 46.42 KG/M2 | RESPIRATION RATE: 18 BRPM | OXYGEN SATURATION: 98 % | DIASTOLIC BLOOD PRESSURE: 84 MMHG | HEIGHT: 63 IN | SYSTOLIC BLOOD PRESSURE: 142 MMHG | WEIGHT: 262 LBS | HEART RATE: 65 BPM

## 2020-02-19 DIAGNOSIS — N18.30 ANEMIA DUE TO STAGE 3 CHRONIC KIDNEY DISEASE (HCC): Primary | ICD-10-CM

## 2020-02-19 DIAGNOSIS — D63.1 ANEMIA DUE TO STAGE 3 CHRONIC KIDNEY DISEASE (HCC): Primary | ICD-10-CM

## 2020-02-19 LAB
BASOPHILS # BLD AUTO: 0.12 10*3/MM3 (ref 0–0.2)
BASOPHILS NFR BLD AUTO: 1.5 % (ref 0–1.5)
DEPRECATED RDW RBC AUTO: 40.3 FL (ref 37–54)
EOSINOPHIL # BLD AUTO: 0.38 10*3/MM3 (ref 0–0.4)
EOSINOPHIL NFR BLD AUTO: 4.9 % (ref 0.3–6.2)
ERYTHROCYTE [DISTWIDTH] IN BLOOD BY AUTOMATED COUNT: 13.1 % (ref 12.3–15.4)
HCT VFR BLD AUTO: 43.2 % (ref 34–46.6)
HGB BLD-MCNC: 13.7 G/DL (ref 12–15.9)
IMM GRANULOCYTES # BLD AUTO: 0.01 10*3/MM3 (ref 0–0.05)
IMM GRANULOCYTES NFR BLD AUTO: 0.1 % (ref 0–0.5)
LYMPHOCYTES # BLD AUTO: 2 10*3/MM3 (ref 0.7–3.1)
LYMPHOCYTES NFR BLD AUTO: 25.6 % (ref 19.6–45.3)
MCH RBC QN AUTO: 26.4 PG (ref 26.6–33)
MCHC RBC AUTO-ENTMCNC: 31.7 G/DL (ref 31.5–35.7)
MCV RBC AUTO: 83.4 FL (ref 79–97)
MONOCYTES # BLD AUTO: 0.5 10*3/MM3 (ref 0.1–0.9)
MONOCYTES NFR BLD AUTO: 6.4 % (ref 5–12)
NEUTROPHILS # BLD AUTO: 4.81 10*3/MM3 (ref 1.7–7)
NEUTROPHILS NFR BLD AUTO: 61.5 % (ref 42.7–76)
PLATELET # BLD AUTO: 336 10*3/MM3 (ref 140–450)
PMV BLD AUTO: 10.6 FL (ref 6–12)
RBC # BLD AUTO: 5.18 10*6/MM3 (ref 3.77–5.28)
WBC NRBC COR # BLD: 7.82 10*3/MM3 (ref 3.4–10.8)

## 2020-02-19 PROCEDURE — 99214 OFFICE O/P EST MOD 30 MIN: CPT | Performed by: INTERNAL MEDICINE

## 2020-02-19 RX ORDER — TIZANIDINE HYDROCHLORIDE 2 MG/1
2 CAPSULE, GELATIN COATED ORAL 3 TIMES DAILY
COMMUNITY

## 2020-02-19 NOTE — TELEPHONE ENCOUNTER
----- Message from Iain Kenney MD sent at 2/19/2020 10:32 AM CST -----  Fax CMP to PCP.  Glucose 320.    Low iron without anemia, observe.

## 2020-04-01 ENCOUNTER — TELEPHONE (OUTPATIENT)
Dept: ONCOLOGY | Facility: CLINIC | Age: 65
End: 2020-04-01

## 2020-04-01 NOTE — TELEPHONE ENCOUNTER
Message was left for patient regarding lab appointment on 4/15/20. Patient has been moved to a waiting list and when we start seeing patients back on [place], patient will be called to reschedule.

## 2020-06-15 ENCOUNTER — TELEPHONE (OUTPATIENT)
Dept: ONCOLOGY | Facility: CLINIC | Age: 65
End: 2020-06-15

## 2020-09-08 ENCOUNTER — TELEPHONE (OUTPATIENT)
Dept: ONCOLOGY | Facility: CLINIC | Age: 65
End: 2020-09-08

## 2020-09-08 NOTE — TELEPHONE ENCOUNTER
DR THOMASON PT: Spoke w/pt to r/s her [facility] waitlist appt with Donn. She states she is doing well and feeling good and that she will call us sometime next year to r/s.

## 2023-05-16 RX ORDER — METRONIDAZOLE 500 MG/1
500 TABLET ORAL 3 TIMES DAILY
COMMUNITY
End: 2023-05-17 | Stop reason: ALTCHOICE

## 2023-05-16 RX ORDER — DULOXETIN HYDROCHLORIDE 30 MG/1
30 CAPSULE, DELAYED RELEASE ORAL DAILY
COMMUNITY

## 2023-05-16 RX ORDER — OMEPRAZOLE 20 MG/1
20 CAPSULE, DELAYED RELEASE ORAL DAILY
COMMUNITY

## 2023-05-16 RX ORDER — DICLOFENAC SODIUM 75 MG/1
75 TABLET, DELAYED RELEASE ORAL DAILY
COMMUNITY

## 2023-05-16 RX ORDER — NYSTATIN 100000 [USP'U]/G
1 POWDER TOPICAL 4 TIMES DAILY PRN
COMMUNITY

## 2023-05-16 RX ORDER — TRAMADOL HYDROCHLORIDE 50 MG/1
50 TABLET ORAL EVERY 6 HOURS PRN
COMMUNITY

## 2023-05-16 RX ORDER — CLINDAMYCIN HYDROCHLORIDE 300 MG/1
300 CAPSULE ORAL 3 TIMES DAILY
COMMUNITY
End: 2023-05-17 | Stop reason: ALTCHOICE

## 2023-05-16 RX ORDER — DULAGLUTIDE 0.75 MG/.5ML
0.75 INJECTION, SOLUTION SUBCUTANEOUS WEEKLY
COMMUNITY

## 2023-05-16 RX ORDER — ATORVASTATIN CALCIUM 20 MG/1
20 TABLET, FILM COATED ORAL DAILY
COMMUNITY

## 2023-05-16 RX ORDER — ERGOCALCIFEROL 1.25 MG/1
50000 CAPSULE ORAL 2 TIMES WEEKLY
COMMUNITY

## 2023-05-16 RX ORDER — GLIPIZIDE 10 MG/1
10 TABLET ORAL
COMMUNITY

## 2023-05-17 ENCOUNTER — OFFICE VISIT (OUTPATIENT)
Dept: CARDIOLOGY | Facility: CLINIC | Age: 68
End: 2023-05-17
Payer: COMMERCIAL

## 2023-05-17 VITALS
HEIGHT: 63 IN | DIASTOLIC BLOOD PRESSURE: 68 MMHG | WEIGHT: 209 LBS | OXYGEN SATURATION: 100 % | SYSTOLIC BLOOD PRESSURE: 128 MMHG | BODY MASS INDEX: 37.03 KG/M2 | HEART RATE: 66 BPM

## 2023-05-17 DIAGNOSIS — E11.65 TYPE 2 DIABETES MELLITUS WITH HYPERGLYCEMIA, WITHOUT LONG-TERM CURRENT USE OF INSULIN: ICD-10-CM

## 2023-05-17 DIAGNOSIS — I44.7 LBBB (LEFT BUNDLE BRANCH BLOCK): ICD-10-CM

## 2023-05-17 DIAGNOSIS — I10 ESSENTIAL HYPERTENSION: ICD-10-CM

## 2023-05-17 DIAGNOSIS — D63.1 ANEMIA DUE TO STAGE 3A CHRONIC KIDNEY DISEASE: ICD-10-CM

## 2023-05-17 DIAGNOSIS — I50.22 CHRONIC SYSTOLIC HEART FAILURE: Primary | ICD-10-CM

## 2023-05-17 DIAGNOSIS — E66.01 CLASS 3 SEVERE OBESITY DUE TO EXCESS CALORIES WITH SERIOUS COMORBIDITY AND BODY MASS INDEX (BMI) OF 45.0 TO 49.9 IN ADULT: ICD-10-CM

## 2023-05-17 DIAGNOSIS — N18.31 ANEMIA DUE TO STAGE 3A CHRONIC KIDNEY DISEASE: ICD-10-CM

## 2023-05-17 DIAGNOSIS — Z86.711 HISTORY OF PULMONARY EMBOLUS (PE): ICD-10-CM

## 2023-05-17 PROBLEM — E66.812 CLASS 2 SEVERE OBESITY DUE TO EXCESS CALORIES WITH SERIOUS COMORBIDITY AND BODY MASS INDEX (BMI) OF 37.0 TO 37.9 IN ADULT: Status: ACTIVE | Noted: 2019-10-02

## 2023-05-17 PROBLEM — Z01.818 PRE-OP EVALUATION: Status: RESOLVED | Noted: 2019-10-02 | Resolved: 2023-05-17

## 2023-05-17 PROBLEM — N18.30 ANEMIA DUE TO STAGE 3 CHRONIC KIDNEY DISEASE: Chronic | Status: ACTIVE | Noted: 2019-10-09

## 2023-05-17 RX ORDER — CLONIDINE HYDROCHLORIDE 0.1 MG/1
0.1 TABLET ORAL 3 TIMES DAILY
Qty: 90 TABLET | Refills: 11 | Status: SHIPPED | OUTPATIENT
Start: 2023-05-17

## 2023-05-17 RX ORDER — METOPROLOL SUCCINATE 50 MG/1
50 TABLET, EXTENDED RELEASE ORAL DAILY
Qty: 30 TABLET | Refills: 11 | Status: SHIPPED | OUTPATIENT
Start: 2023-05-17 | End: 2024-05-16

## 2023-05-17 RX ORDER — DOXYCYCLINE HYCLATE 100 MG/1
1 CAPSULE ORAL EVERY 12 HOURS SCHEDULED
COMMUNITY
Start: 2023-05-10 | End: 2023-05-21

## 2023-05-17 NOTE — ASSESSMENT & PLAN NOTE
Continue Xarelto 20 mg daily for now. Advised her to take with largest protein meal of the day instead of at bedtime. Will consider decreasing to 10 mg daily for DVT/PE prophylaxis if no LV thrombus of severe systolic dysfunction on echo.

## 2023-05-17 NOTE — Clinical Note
Copy of records from [facility] in [place] approx 2015 re: pulmonary embolus and copy of most recent EKG from Dr. Jasso. TX!

## 2023-05-17 NOTE — ASSESSMENT & PLAN NOTE
Change metoprolol to succinate 50 mg daily. Add Entresto 24-26 mg BID (samples, RX, and 30 day free trial card provided). Will try to intensify HF medications and wean off clonidine and nifidepine. Check echo and BNP. Refer for CardioMems implantation if meets criteria. Loop diuretic only if needed. Encouraged use of compression to BLEs. Educated patient on basic heart failure pathophysiology, treatment, and self care. Discussed sodium and fluid restriction. Reviewed signs and symptoms and when to call.

## 2023-05-17 NOTE — PROGRESS NOTES
"Encounter Date:05/17/2023  Chief Complaint:   Antwon Garcias is a 68 y.o. female who presents to [facility] today for cardiac evaluation at the request of her PCP Brian Rey NP and to transfer cardiac care from Dr. Jasso who is no longer practicing at [facility]. She previously saw Dr. Mensah with our group 10/2019 for bariatric surgery clearance. She no showed or canceled subsequent visits and never rescheduled.       Hypertension    Congestive Heart Failure     HPI       Hypertension     Additional comments: Has been \"good\" but sometimes fluctuating low to high - takes clonidine 3 times per day. Checks 2-3 times per day. 114/58 is the lowest and goes up to 200s/120s.              LBBB     Additional comments: chronic             NEW PATIENT     Additional comments: REFERRED BY BRIAN REY AT [facility]/ HAD CARDIAC TESTING AT [facility] IN THE PAST- RECORDS IN CHART             Pulmonary Embolism     Additional comments: 2021 - treated at [facility] by Dr. Jasso - takes Xarelto - still on 20 mg daily. Hx hemothorax 2015 after/with bronchoscopy at [facility] in [place]. No bleeding or falls.             Congestive Heart Failure     Additional comments: Dr. Jasso recommend she have Cardio Mems implanted but she never got done due to multiple family members getting sick. Chronic systolic - pulled 9 L of fluid off during last hospitalization at [facility] in 2021. Ankles started swelling a few days ago to 4-6 weeks - doesn't feel like it's in her lungs. Has cellulitis in RLE - started on anbtx. Gets better at night. Has been off diuretics. No orthopnea, PND, or sudden. Has lost 80 lbs over the last year without bariatric surgery.           Last edited by Nancy Sethi, APRN on 5/17/2023  4:25 PM.        Past Medical History:   Diagnosis Date    Bradycardia     Cardiomyopathy     CHF (congestive heart failure)     Chronic kidney disease     Coronary arteriosclerosis     " Diabetes mellitus     Diverticulitis     GERD (gastroesophageal reflux disease)     HLD (hyperlipidemia)     HTN (hypertension)     Hx of pulmonary embolus     Hypercholesterolemia     LBBB (left bundle branch block)     Obesity     Pulmonary embolism     Rectal bleeding     RLS (restless legs syndrome)     Vitamin D deficiency      Past Surgical History:   Procedure Laterality Date    APPENDECTOMY  1970    CARDIAC CATHETERIZATION      AT [facility]- DR TANNER    CHOLECYSTECTOMY  1991    HYSTERECTOMY  1989    LEG SURGERY Left 2015    CALCIFIED TUMOR    TUBAL ABDOMINAL LIGATION  1982    TUMOR REMOVAL       Family History   Problem Relation Age of Onset    Hypertension Mother     Heart disease Mother     Diabetes type II Mother     Hypertension Father     Heart disease Father     Diabetes type II Father     Hypertension Sister     Ulcerative colitis Sister     Stroke Sister     Hypertension Brother     Diabetes type II Brother     Heart disease Brother     Heart disease Maternal Grandmother     Heart disease Maternal Grandfather     Diabetes type II Paternal Grandmother     Heart disease Paternal Grandfather      Social History     Socioeconomic History    Marital status:    Tobacco Use    Smoking status: Former     Packs/day: 0.25     Years: 20.00     Pack years: 5.00     Types: Cigarettes     Start date:      Quit date:      Years since quittin.3    Smokeless tobacco: Never   Vaping Use    Vaping Use: Former    Substances: Flavoring   Substance and Sexual Activity    Alcohol use: Not Currently     Comment: OCC IN THE PAST    Drug use: Never    Sexual activity: Defer       History: Past medical, surgical, family, and social history reviewed.    Outpatient Medications Marked as Taking for the 23 encounter (Office Visit) with Nancy Sethi APRN   Medication Sig Dispense Refill    atorvastatin (LIPITOR) 20 MG tablet Take 1 tablet by mouth Daily.      cloNIDine (CATAPRES) 0.1 MG tablet Take  "1 tablet by mouth 3 (Three) Times a Day. 90 tablet 11    diclofenac (VOLTAREN) 75 MG EC tablet Take 1 tablet by mouth Daily.      doxycycline (VIBRAMYCIN) 100 MG capsule Take 1 capsule by mouth Every 12 (Twelve) Hours.      Dulaglutide (Trulicity) 0.75 MG/0.5ML solution pen-injector Inject 0.75 mg under the skin into the appropriate area as directed 1 (One) Time Per Week.      DULoxetine (CYMBALTA) 30 MG capsule Take 1 capsule by mouth Daily.      glipizide (GLUCOTROL) 10 MG tablet Take 1 tablet by mouth 2 (Two) Times a Day Before Meals.      montelukast (SINGULAIR) 10 MG tablet Take 1 tablet by mouth Daily.      NIFEdipine CC (ADALAT CC) 60 MG 24 hr tablet Take 1 tablet by mouth Daily.      nystatin (MYCOSTATIN) 145910 UNIT/GM powder Apply 1 application topically to the appropriate area as directed 4 (Four) Times a Day As Needed.      omeprazole (priLOSEC) 20 MG capsule Take 1 capsule by mouth Daily.      rivaroxaban (XARELTO) 20 MG tablet Take 1 tablet by mouth Every Night.      TiZANidine (ZANAFLEX) 4 MG capsule Take 1 capsule by mouth 3 (Three) Times a Day As Needed for Muscle Spasms. Usually takes twice a day      traMADol (ULTRAM) 50 MG tablet Take 1 tablet by mouth Every 6 (Six) Hours As Needed for Moderate Pain.      vitamin D (ERGOCALCIFEROL) 1.25 MG (31427 UT) capsule capsule Take 1 capsule by mouth 2 (Two) Times a Week.      zolpidem (AMBIEN) 10 MG tablet Take 1 tablet by mouth every night at bedtime. EVERY NIGHT  1    [DISCONTINUED] cloNIDine (CATAPRES) 0.2 MG tablet Take 1 tablet by mouth 3 (Three) Times a Day.  2    [DISCONTINUED] METOPROLOL TARTRATE PO Take 100 mg by mouth Daily.          Objective     Vital Signs:   /68 (BP Location: Left arm, Patient Position: Sitting, Cuff Size: Adult)   Pulse 66   Ht 160 cm (63\")   Wt 94.8 kg (209 lb)   SpO2 100%   BMI 37.02 kg/m²   Wt Readings from Last 3 Encounters:   05/17/23 94.8 kg (209 lb)   02/19/20 119 kg (262 lb)   10/30/19 120 kg (264 lb)      "    Vitals reviewed.   Constitutional:       Appearance: Well-developed and not in distress. Morbidly obese.   Eyes:      General: No scleral icterus.  HENT:      Head: Normocephalic and atraumatic.   Neck:      Vascular: No JVD.      Comments: thick  Pulmonary:      Effort: Pulmonary effort is normal.      Breath sounds: Normal breath sounds.   Chest:      Comments: Increased AP diameter, kyphosis noted  Cardiovascular:      Normal rate. Regular rhythm.      No gallop.       Comments: RLE tender to palpation  Pulses:     Intact distal pulses.   Edema:     Peripheral edema present.     Pretibial: bilateral 2+ pitting edema of the pretibial area.     Ankle: bilateral 1+ pitting edema of the ankle.  Skin:     General: Skin is warm and dry.   Neurological:      Mental Status: Alert and oriented to person, place, and time.      Gait: Gait abnormal.   Psychiatric:         Behavior: Behavior is cooperative.       Result Review  Data Reviewed:  The following data was reviewed by: GUILHERME Boston on 05/17/2023  PROGRESS NOTES - SCAN - PN_BRIAN RUSS (03/30/2023)   Scan on 3/29/2023 by Nancy Sethi APRN: CBC/PT INR/CMP/MG/ [facility]/ 3-   Scan on 1/5/2023 by Nancy Sethi APRN: CBC/CMP/MG/LIPID/TSH/A1C/B12/VIT D/ [facility]/ 1-5-2023   Scan on 5/6/2021 by Nancy Sethi APRN: US BRENDON VENOUS DUP/ [facility]/ 5-6-2021  TSH (03/14/2021 19:00)   Scan on 3/15/2021 by Nancy Sethi APRN: ECHO/ [facility]/ 3-   Scan on 11/15/2017 by Nancy Sethi APRN: NM CARDIOLITE STRESS TEST/ [facility]/ 11-   CT Angiogram Chest (05/23/2016 19:41 EDT)    XR Chest PA & Lateral (05/23/2016 16:39 EDT)   CTA Chest W WO Contrast (11/28/2015 08:27 EST)   ULTRASOUND VENOUS LIMITED (08/25/2015 14:20)   CT ANGIOGRAM CHEST WITH AND WITHOUT CONTRAST (08/25/2015 14:20)            ECG 12 Lead    Date/Time: 5/17/2023 3:12 PM  Performed by: Nancy Sethi APRN  Authorized by: Nancy Sethi APRN    Comparison: compared with previous ECG from 10/3/2019  Comparison to previous ECG: ST/T wave abnormalities and right axis deviation are new  Rhythm: sinus rhythm  BPM: 71  Conduction: left bundle branch block  QRS axis: right (borderline)    Clinical impression: abnormal EKG           Assessment and Plan   Problem List Items Addressed This Visit          Cardiac and Vasculature    Chronic systolic heart failure - Primary    Overview     EF 27% 2017 nuclear stress  EF abnormal stress 2021, reportedly normal cath  EF 40%, severe SHANON, moderate LAE, moderate to severe RVE, mild MR, severe TR (RVSP 45 mm Hg), mild PI, physiologic pericardial effusion 3/2021 echo, Dr. Jasso, [facility]             Current Assessment & Plan     Change metoprolol to succinate 50 mg daily. Add Entresto 24-26 mg BID (samples, RX, and 30 day free trial card provided). Will try to intensify HF medications and wean off clonidine and nifidepine. Check echo and BNP. Refer for CardioMems implantation if meets criteria. Loop diuretic only if needed. Encouraged use of compression to BLEs. Educated patient on basic heart failure pathophysiology, treatment, and self care. Discussed sodium and fluid restriction. Reviewed signs and symptoms and when to call.           Relevant Medications    metoprolol succinate XL (TOPROL-XL) 50 MG 24 hr tablet    sacubitril-valsartan (ENTRESTO) 24-26 MG tablet    Other Relevant Orders    Adult Transthoracic Echo Complete W/ Cont if Necessary Per Protocol    ECG 12 Lead    proBNP    Essential hypertension    Relevant Medications    metoprolol succinate XL (TOPROL-XL) 50 MG 24 hr tablet    cloNIDine (CATAPRES) 0.1 MG tablet    Other Relevant Orders    ECG 12 Lead    LBBB (left bundle branch block)    Relevant Medications    metoprolol succinate XL (TOPROL-XL) 50 MG 24 hr tablet       Coag and Thromboembolic    History of pulmonary embolus (PE)    Current Assessment & Plan     Continue Xarelto 20 mg daily for now. Advised  her to take with largest protein meal of the day instead of at bedtime. Will consider decreasing to 10 mg daily for DVT/PE prophylaxis if no LV thrombus of severe systolic dysfunction on echo.            Endocrine and Metabolic    Class 2 severe obesity due to excess calories with serious comorbidity and body mass index (BMI) of 37.0 to 37.9 in adult    Current Assessment & Plan     Patient's (Body mass index is 37.02 kg/m².) indicates that they are morbidly/severely obese (BMI > 40 or > 35 with obesity - related health condition) with health conditions that include hypertension, diabetes mellitus, and osteoarthritis . Weight is newly identified by me (but much improved compared to last visit by our group in 2019). BMI  is above average; BMI management plan is completed. We discussed low calorie, low carb based diet program and portion control. Goal BMI less than 30.         Type 2 diabetes mellitus with hyperglycemia, without long-term current use of insulin    Overview     Hgb A1c 13% 1/2023         Current Assessment & Plan     Followed by PCP, complicates heart failure. Consider addition of Farxiga or Jardiance to decrease CV risks and help control glucose.            Hematology and Neoplasia    Anemia due to stage 3 chronic kidney disease    Overview     DIAGNOSTIC ABNORMALITIES:   CBC 07/24/2018 revealed a WBC of 10.8, hemoglobin 14.3, hematocrit 43.7, MCV 83.4, platelet count 321,000 with normal differential.  CBC 11/05/2018 revealed a WBC of 14.3, hemoglobin 12.19, hematocrit 39.7, MCV 85, platelet count 364,000 with ANC of 8.4, and monocytosis of 0.99.  CBC 11/20/2018 revealed a WBC of 11.3, hemoglobin 11.7, hematocrit 35.6, MCV 85.6, platelet count 313,000 with normal differential.  CBC 11/28/2018 revealed a WBC of 12.9, hemoglobin 11.9, hematocrit 36.5, MCV 84.9, platelet count 371,000 with ANC of 7.9.  CBC  12/10/2018 revealed a WBC of 11.1, hemoglobin 11.9, hematocrit 37.1, MCV 85.7, platelet count  365,000 with normal differential.      PREVIOUS INTERVENTIONS:  Ferrous sulfate 325 mg 02/12/2019 through 02/18/2019.  Stopped due to severe abdominal cramps.  Nulecit 125 mg 05/05/2019 and 03/12/2019 at [facility].   Third dose cancelled due to flu.           Current Assessment & Plan     Improved. May need to consider IV iron if unable to tolerate oral.           Patient was given instructions and counseling regarding her condition or for health maintenance advice. Please see specific information pulled into the AVS if appropriate.    Follow Up :   Return in about 1 month (around 6/17/2023) for Recheck.       Time Spent: I spent 64 minutes caring for Damaris on this date of service. This time includes time spent by me in the following activities: preparing for the visit, reviewing tests, performing a medically appropriate examination and/or evaluation, counseling and educating the patient/family/caregiver, ordering medications, tests, or procedures, and documenting information in the medical record.     I spent 2 minutes on the separately reported service of EKG. This time is not included in the time used to support the E/M service also reported today.      Nancy Sethi, APRN, ACNP-BC, CHFN-BC

## 2023-05-17 NOTE — ASSESSMENT & PLAN NOTE
Patient's (Body mass index is 37.02 kg/m².) indicates that they are morbidly/severely obese (BMI > 40 or > 35 with obesity - related health condition) with health conditions that include hypertension, diabetes mellitus, and osteoarthritis . Weight is newly identified by me (but much improved compared to last visit by our group in 2019). BMI  is above average; BMI management plan is completed. We discussed low calorie, low carb based diet program and portion control. Goal BMI less than 30.

## 2023-05-17 NOTE — ASSESSMENT & PLAN NOTE
Followed by PCP, complicates heart failure. Consider addition of Farxiga or Jardiance to decrease CV risks and help control glucose.

## 2023-05-18 ENCOUNTER — TELEPHONE (OUTPATIENT)
Dept: CARDIOLOGY | Facility: CLINIC | Age: 68
End: 2023-05-18
Payer: COMMERCIAL

## 2023-05-18 NOTE — TELEPHONE ENCOUNTER
Spoke with [facility] and No referral is needed for patient to see Nancy Sethi and we are in network with insurance.

## 2023-05-19 ENCOUNTER — PATIENT ROUNDING (BHMG ONLY) (OUTPATIENT)
Dept: CARDIOLOGY | Facility: CLINIC | Age: 68
End: 2023-05-19
Payer: COMMERCIAL

## 2023-05-19 NOTE — PROGRESS NOTES
May 19, 2023    Hello, may I speak with Damaris Garcias?    My name is [facility], Practice Lead       I am  with [facility] [place] [facility]  [place] [address] [phone].    Before we get started may I verify your date of birth? 1955    I am calling to officially welcome you to our practice and ask about your recent visit. Is this a good time to talk? No answer, left voice mail    Tell me about your visit with us. What things went well?  no answer        We're always looking for ways to make our patients' experiences even better. Do you have recommendations on ways we may improve?  No answer    Overall were you satisfied with your first visit to our practice? No answer        I appreciate you taking the time to speak with me today. Is there anything else I can do for you? No answer       Thank you, and have a great day.

## 2023-05-24 ENCOUNTER — TELEPHONE (OUTPATIENT)
Dept: CARDIOLOGY | Facility: CLINIC | Age: 68
End: 2023-05-24
Payer: COMMERCIAL

## 2023-05-24 NOTE — TELEPHONE ENCOUNTER
Called patient this morning to remind her to get BNP lab drawn. While on the phone she stated her BP has been running very low the last 2-3 days. She stated yesterday it was 99/64, highest all day was 119/64. She said she has been feeling awful and has not gotten out of bed today yet.  She didn't know if this was the effects of the the Entresto that was started.    I told patient I would call her back around noon to check on her and see what her BP is today. She voiced understanding.    Please advise.  Thanks

## 2023-05-24 NOTE — TELEPHONE ENCOUNTER
Called patient back. Left voicemail letting her know to give [facility] a call for further recommendations.

## 2023-05-24 NOTE — TELEPHONE ENCOUNTER
Yes, could be from the Entresto. Has she changed to the Toprol 50 mg daily? If so, have her decrease to Toprol 25 mg nightly instead of in am. If she hasn't have her decrease metoprolol tartrate from 100 mg to 50 mg until she can switch to the Toprol. Call if any further issues. 119/64 is good but 99/64 may be a little low for her. See what her resting heart rates are running too. TX!

## 2023-05-24 NOTE — TELEPHONE ENCOUNTER
Yes, it may take some time to adjust to having a normal BP. Have her call if any severe symptoms like feeling like she's going to pass out or passing out. We will make adjustments to reach goal resting HR less than 70 and -110s/60-70s. We may have to go back up on Toprol and decrease the Entresto to half tab twice a day but let's see what she does the next several days. Have her call a BP, HR, and symptom log Friday. TX!

## 2023-05-24 NOTE — TELEPHONE ENCOUNTER
Patient had decreased toprol to 50 mg daily. Advised to decrease to 25 mg in the pm instead of the am. She checked her bp right before I called and it was 110/64 HR 96. She said her HR has been running high- which is what concerns her. I advised the 119/64 BP is good and to call if it starts running much lower than that consistently. She said she cant function with her BP that low. I did advise that her body may need some adjustment time. She understood. I told her we may have other suggestions after I advised you of her HR's. Last 3-4 days consistently staying in the 90s. Thanks

## 2023-05-25 NOTE — TELEPHONE ENCOUNTER
Notified patient. Voiced understanding. Advised to keep bp/hr/ symptom log and call back Tuesday. Will make further med changes at that time. She voiced understanding

## 2023-05-31 ENCOUNTER — OUTSIDE FACILITY SERVICE (OUTPATIENT)
Dept: CARDIOLOGY | Facility: CLINIC | Age: 68
End: 2023-05-31
Payer: MEDICARE

## 2023-05-31 DIAGNOSIS — I50.22 CHRONIC SYSTOLIC HEART FAILURE: Primary | ICD-10-CM

## 2023-05-31 PROCEDURE — 93306 TTE W/DOPPLER COMPLETE: CPT | Performed by: INTERNAL MEDICINE

## 2023-05-31 RX ORDER — FUROSEMIDE 20 MG/1
20 TABLET ORAL DAILY PRN
Qty: 30 TABLET | Refills: 2 | Status: ON HOLD | OUTPATIENT
Start: 2023-05-31 | End: 2024-05-30

## 2023-06-02 ENCOUNTER — APPOINTMENT (OUTPATIENT)
Dept: GENERAL RADIOLOGY | Facility: HOSPITAL | Age: 68
End: 2023-06-02
Payer: MEDICARE

## 2023-06-02 ENCOUNTER — APPOINTMENT (OUTPATIENT)
Dept: CT IMAGING | Facility: HOSPITAL | Age: 68
End: 2023-06-02
Payer: MEDICARE

## 2023-06-02 ENCOUNTER — HOSPITAL ENCOUNTER (EMERGENCY)
Facility: HOSPITAL | Age: 68
Discharge: HOME | End: 2023-06-02
Attending: FAMILY MEDICINE
Payer: MEDICARE

## 2023-06-02 VITALS
BODY MASS INDEX: 36.86 KG/M2 | DIASTOLIC BLOOD PRESSURE: 99 MMHG | WEIGHT: 208 LBS | HEART RATE: 75 BPM | HEIGHT: 63 IN | OXYGEN SATURATION: 98 % | TEMPERATURE: 98.3 F | RESPIRATION RATE: 18 BRPM | SYSTOLIC BLOOD PRESSURE: 184 MMHG

## 2023-06-02 DIAGNOSIS — D72.829 LEUKOCYTOSIS, UNSPECIFIED TYPE: Primary | ICD-10-CM

## 2023-06-02 DIAGNOSIS — I10 ESSENTIAL HYPERTENSION: ICD-10-CM

## 2023-06-02 DIAGNOSIS — R06.02 SHORTNESS OF BREATH: ICD-10-CM

## 2023-06-02 DIAGNOSIS — I50.22 CHRONIC SYSTOLIC HEART FAILURE: ICD-10-CM

## 2023-06-02 LAB
ANION GAP SERPL CALCULATED.3IONS-SCNC: 12 MMOL/L (ref 5–15)
ANISOCYTOSIS BLD QL: ABNORMAL
BUN SERPL-MCNC: 16 MG/DL (ref 8–23)
BUN/CREAT SERPL: 25.8 (ref 7–25)
CALCIUM SPEC-SCNC: 9 MG/DL (ref 8.6–10.5)
CHLORIDE SERPL-SCNC: 97 MMOL/L (ref 98–107)
CO2 SERPL-SCNC: 25 MMOL/L (ref 22–29)
CREAT SERPL-MCNC: 0.62 MG/DL (ref 0.57–1)
DEPRECATED RDW RBC AUTO: 40.9 FL (ref 37–54)
EGFRCR SERPLBLD CKD-EPI 2021: 98.4 ML/MIN/1.73
EOSINOPHIL # BLD MANUAL: 0.13 10*3/MM3 (ref 0–0.4)
EOSINOPHIL NFR BLD MANUAL: 1 % (ref 0.3–6.2)
ERYTHROCYTE [DISTWIDTH] IN BLOOD BY AUTOMATED COUNT: 15.6 % (ref 12.3–15.4)
GIANT PLATELETS: ABNORMAL
GLUCOSE SERPL-MCNC: 328 MG/DL (ref 65–99)
HCT VFR BLD AUTO: 31.7 % (ref 34–46.6)
HGB BLD-MCNC: 8.6 G/DL (ref 12–15.9)
HOLD SPECIMEN: NORMAL
HYPOCHROMIA BLD QL: ABNORMAL
LYMPHOCYTES # BLD MANUAL: 1.6 10*3/MM3 (ref 0.7–3.1)
LYMPHOCYTES NFR BLD MANUAL: 4.2 % (ref 5–12)
MCH RBC QN AUTO: 19.9 PG (ref 26.6–33)
MCHC RBC AUTO-ENTMCNC: 27.1 G/DL (ref 31.5–35.7)
MCV RBC AUTO: 73.2 FL (ref 79–97)
MICROCYTES BLD QL: ABNORMAL
MONOCYTES # BLD: 0.54 10*3/MM3 (ref 0.1–0.9)
NEUTROPHILS # BLD AUTO: 10.51 10*3/MM3 (ref 1.7–7)
NEUTROPHILS NFR BLD MANUAL: 82.3 % (ref 42.7–76)
NRBC SPEC MANUAL: 1 /100 WBC (ref 0–0.2)
NT-PROBNP SERPL-MCNC: 892.9 PG/ML (ref 0–900)
PLATELET # BLD AUTO: 495 10*3/MM3 (ref 140–450)
PMV BLD AUTO: 11.2 FL (ref 6–12)
POIKILOCYTOSIS BLD QL SMEAR: ABNORMAL
POLYCHROMASIA BLD QL SMEAR: ABNORMAL
POTASSIUM SERPL-SCNC: 4.1 MMOL/L (ref 3.5–5.2)
RBC # BLD AUTO: 4.33 10*6/MM3 (ref 3.77–5.28)
SODIUM SERPL-SCNC: 134 MMOL/L (ref 136–145)
TROPONIN T SERPL HS-MCNC: 11 NG/L
VARIANT LYMPHS NFR BLD MANUAL: 12.5 % (ref 19.6–45.3)
WBC MORPH BLD: NORMAL
WBC NRBC COR # BLD: 12.77 10*3/MM3 (ref 3.4–10.8)
WHOLE BLOOD HOLD COAG: NORMAL
WHOLE BLOOD HOLD SPECIMEN: NORMAL

## 2023-06-02 PROCEDURE — 93005 ELECTROCARDIOGRAM TRACING: CPT | Performed by: FAMILY MEDICINE

## 2023-06-02 PROCEDURE — 99283 EMERGENCY DEPT VISIT LOW MDM: CPT

## 2023-06-02 PROCEDURE — 85025 COMPLETE CBC W/AUTO DIFF WBC: CPT | Performed by: FAMILY MEDICINE

## 2023-06-02 PROCEDURE — 71275 CT ANGIOGRAPHY CHEST: CPT

## 2023-06-02 PROCEDURE — 25510000001 IOPAMIDOL PER 1 ML: Performed by: FAMILY MEDICINE

## 2023-06-02 PROCEDURE — 71045 X-RAY EXAM CHEST 1 VIEW: CPT

## 2023-06-02 PROCEDURE — 83880 ASSAY OF NATRIURETIC PEPTIDE: CPT | Performed by: FAMILY MEDICINE

## 2023-06-02 PROCEDURE — 36415 COLL VENOUS BLD VENIPUNCTURE: CPT

## 2023-06-02 PROCEDURE — 84484 ASSAY OF TROPONIN QUANT: CPT | Performed by: FAMILY MEDICINE

## 2023-06-02 PROCEDURE — 85007 BL SMEAR W/DIFF WBC COUNT: CPT | Performed by: FAMILY MEDICINE

## 2023-06-02 PROCEDURE — 80048 BASIC METABOLIC PNL TOTAL CA: CPT | Performed by: FAMILY MEDICINE

## 2023-06-02 RX ORDER — SODIUM CHLORIDE 0.9 % (FLUSH) 0.9 %
10 SYRINGE (ML) INJECTION AS NEEDED
Status: DISCONTINUED | OUTPATIENT
Start: 2023-06-02 | End: 2023-06-02 | Stop reason: HOSPADM

## 2023-06-02 RX ORDER — CLONIDINE HYDROCHLORIDE 0.1 MG/1
0.1 TABLET ORAL 3 TIMES DAILY PRN
Qty: 90 TABLET | Refills: 11 | Status: ON HOLD | OUTPATIENT
Start: 2023-06-02 | End: 2023-06-05

## 2023-06-02 RX ADMIN — IOPAMIDOL 100 ML: 755 INJECTION, SOLUTION INTRAVENOUS at 18:31

## 2023-06-02 NOTE — ED PROVIDER NOTES
Subjective   History of Present Illness  68-year-old female presents emergency room with fever.  Patient also had abnormal blood work done by the nurse practitioner at the cardiology office.  She was told she has an elevated white blood cell count.  Patient states she also had a fever 100.9 °F this morning.  Patient also had a fever of 100.8 °F yesterday.  Patient states that she has not taken any fever reducing medications.  Patient states that she has been having some shortness of breath.  Patient has no cough.  Patient has no chest pain.    Review of Systems   Constitutional:  Positive for fever.   Respiratory:  Positive for shortness of breath.    All other systems reviewed and are negative.    Past Medical History:   Diagnosis Date    Bradycardia     Cardiomyopathy     CHF (congestive heart failure)     Chronic kidney disease     Coronary arteriosclerosis     Diabetes mellitus     Diverticulitis     GERD (gastroesophageal reflux disease)     HLD (hyperlipidemia)     HTN (hypertension)     Hx of pulmonary embolus     Hypercholesterolemia     LBBB (left bundle branch block)     Obesity     Pulmonary embolism     Rectal bleeding     RLS (restless legs syndrome)     Vitamin D deficiency        Allergies   Allergen Reactions    Cephalexin Shortness Of Breath    Codeine Shortness Of Breath    Levaquin [Levofloxacin] Anaphylaxis    Morphine Shortness Of Breath    Penicillins Shortness Of Breath    Aspirin Other (See Comments)    Cetirizine Dizziness    Cheese Other (See Comments)     BLUE CHEESE- THROAT IRRITATION    Ciprofloxacin Other (See Comments)     Was taking zanaflex at the time and there is a drug interaction    Gabapentin Swelling    Keppra [Levetiracetam] Other (See Comments)     VISION LOSS    Lisinopril Other (See Comments)     Dry cough    Meloxicam Nausea Only    Vancomycin Other (See Comments)     Vision loss    Sulfa Antibiotics Rash       Past Surgical History:   Procedure Laterality Date     APPENDECTOMY  1970    CARDIAC CATHETERIZATION      [place] [facility] [place]asso - mild, diffuse, non-obstructive CAD    CHOLECYSTECTOMY  1991    HYSTERECTOMY  1989    LEG SURGERY Left 2015    CALCIFIED TUMOR    TUBAL ABDOMINAL LIGATION  1982    TUMOR REMOVAL         Family History   Problem Relation Age of Onset    Hypertension Mother     Heart disease Mother     Diabetes type II Mother     Hypertension Father     Heart disease Father     Diabetes type II Father     Hypertension Sister     Ulcerative colitis Sister     Stroke Sister     Hypertension Brother     Diabetes type II Brother     Heart disease Brother     Heart disease Maternal Grandmother     Heart disease Maternal Grandfather     Diabetes type II Paternal Grandmother     Heart disease Paternal Grandfather        Social History     Socioeconomic History    Marital status:    Tobacco Use    Smoking status: Former     Packs/day: 0.25     Years: 20.00     Pack years: 5.00     Types: Cigarettes     Start date:      Quit date:      Years since quittin.4    Smokeless tobacco: Never   Vaping Use    Vaping Use: Former    Substances: Flavoring   Substance and Sexual Activity    Alcohol use: Not Currently     Comment: OCC IN THE PAST    Drug use: Never    Sexual activity: Defer           Objective   Physical Exam  Vitals and nursing note reviewed.   Constitutional:       Appearance: Normal appearance.   HENT:      Head: Normocephalic and atraumatic.      Mouth/Throat:      Mouth: Mucous membranes are moist.   Eyes:      Extraocular Movements: Extraocular movements intact.      Pupils: Pupils are equal, round, and reactive to light.   Cardiovascular:      Rate and Rhythm: Normal rate and regular rhythm.      Heart sounds: Normal heart sounds.   Pulmonary:      Effort: Pulmonary effort is normal.      Breath sounds: Normal breath sounds.   Skin:     General: Skin is warm and dry.   Neurological:      General: No focal deficit present.      Mental  Status: She is alert and oriented to person, place, and time.   Psychiatric:         Mood and Affect: Mood normal.         Behavior: Behavior normal.       Procedures           ED Course  ED Course as of 06/02/23 2010 Fri Jun 02, 2023   1847 EKG rate 66  Sinus rhythm  No STEMI [RP]      ED Course User Index  [RP] Hubert Cummings MD                                         Lab Results (last 24 hours)       Procedure Component Value Units Date/Time    [place] Blood Culture Bottle Set [[id number]] Collected: 06/02/23 1504    Specimen: Blood from Arm, Right Updated: 06/02/23 1615     Extra Tube Hold for add-ons.     Comment: Auto resulted.       CBC & Differential [[id number]]  (Abnormal) Collected: 06/02/23 1504    Specimen: Blood from Arm, Right Updated: 06/02/23 1638    Narrative:      The following orders were created for panel order CBC & Differential.  Procedure                               Abnormality         Status                     ---------                               -----------         ------                     CBC Auto Differential[[id number]]        Abnormal            Final result                 Please view results for these tests on the individual orders.    Basic Metabolic Panel [[id number]]  (Abnormal) Collected: 06/02/23 1504    Specimen: Blood from Arm, Right Updated: 06/02/23 1629     Glucose 328 mg/dL      BUN 16 mg/dL      Creatinine 0.62 mg/dL      Sodium 134 mmol/L      Potassium 4.1 mmol/L      Chloride 97 mmol/L      CO2 25.0 mmol/L      Calcium 9.0 mg/dL      BUN/Creatinine Ratio 25.8     Anion Gap 12.0 mmol/L      eGFR 98.4 mL/min/1.73     Narrative:      GFR Normal >60  Chronic Kidney Disease <60  Kidney Failure <15      CBC Auto Differential [[id number]]  (Abnormal) Collected: 06/02/23 1504    Specimen: Blood from Arm, Right Updated: 06/02/23 1638     WBC 12.77 10*3/mm3      RBC 4.33 10*6/mm3      Hemoglobin 8.6 g/dL      Hematocrit 31.7 %      MCV 73.2 fL      MCH 19.9 pg      MCHC 27.1  g/dL      RDW 15.6 %      RDW-SD 40.9 fl      MPV 11.2 fL      Platelets 495 10*3/mm3     Narrative:      The previously reported component NRBC is no longer being reported. Previous result was 0.0 /100 WBC (Reference Range: 0.0-0.2 /100 WBC) on 6/2/2023 at 1617 CDT.    Manual Differential [[id number]]  (Abnormal) Collected: 06/02/23 1504    Specimen: Blood from Arm, Right Updated: 06/02/23 1638     Neutrophil % 82.3 %      Lymphocyte % 12.5 %      Monocyte % 4.2 %      Eosinophil % 1.0 %      Neutrophils Absolute 10.51 10*3/mm3      Lymphocytes Absolute 1.60 10*3/mm3      Monocytes Absolute 0.54 10*3/mm3      Eosinophils Absolute 0.13 10*3/mm3      nRBC 1.0 /100 WBC      Anisocytosis Mod/2+     Hypochromia Slight/1+     Microcytes Mod/2+     Poikilocytes Slight/1+     Polychromasia Slight/1+     WBC Morphology Normal     Giant Platelets Mod/2+    Single High Sensitivity Troponin T [[id number]]  (Abnormal) Collected: 06/02/23 1504    Specimen: Blood from Arm, Right Updated: 06/02/23 1709     HS Troponin T 11 ng/L     Narrative:      High Sensitive Troponin T Reference Range:  <10.0 ng/L- Negative Female for AMI  <15.0 ng/L- Negative Male for AMI  >=10 - Abnormal Female indicating possible myocardial injury.  >=15 - Abnormal Male indicating possible myocardial injury.   Clinicians would have to utilize clinical acumen, EKG, Troponin, and serial changes to determine if it is an Acute Myocardial Infarction or myocardial injury due to an underlying chronic condition.         BNP [[id number]]  (Normal) Collected: 06/02/23 1504    Specimen: Blood from Arm, Right Updated: 06/02/23 1710     proBNP 892.9 pg/mL     Narrative:      Among patients with dyspnea, NT-proBNP is highly sensitive for the detection of acute congestive heart failure. In addition NT-proBNP of <300 pg/ml effectively rules out acute congestive heart failure with 99% negative predictive value.    Results may be falsely decreased if patient taking Biotin.       [place] Urine - Urine, Clean Catch [[id number]] Collected: 06/02/23 1625    Specimen: Urine, Clean Catch Updated: 06/02/23 1731     Extra Tube Hold for add-ons.     Comment: Auto resulted.               CT Angiogram Chest   Final Result   1. No evidence of pulmonary embolism or acute intrathoracic pathology.   2. Small subpleural densities in the right apex and right upper lobe   laterally and favored represent areas of scarring and/or atelectasis.   There is no evidence of pneumonia.               This report was finalized on 06/02/2023 18:47 by Dr. Russell Sawyer MD.      XR Chest 1 View   Final Result   Somewhat shallow inspiration, no acute cardiopulmonary   abnormality.       This report was finalized on 06/02/2023 17:07 by Dr. Russell Sawyer MD.          Medications   sodium chloride 0.9 % flush 10 mL (has no administration in time range)   iopamidol (ISOVUE-370) 76 % injection 100 mL (100 mL Intravenous Given 6/2/23 1831)         Medical Decision Making  Patient was found to have leukocytosis on her blood work.  Patient states that that has improved from earlier his labs where it was about 14,000.  Patient is in no distress.  All questions were answered for the patient.  Patient was offered admission.  Patient states that she is okay with being discharged home.  Patient will follow-up with her primary care provider in outpatient basis.  Patient has been advised to return to the emergency room with new or worsening symptoms.  All questions were answered for the patient prior to discharge.  Patient verbalized understanding was agreeable plan as discussed.    Amount and/or Complexity of Data Reviewed  Labs: ordered. Decision-making details documented in ED Course.  Radiology: ordered. Decision-making details documented in ED Course.  ECG/medicine tests: ordered. Decision-making details documented in ED Course.    Risk  Prescription drug management.        Final diagnoses:   Leukocytosis, unspecified type    Shortness of breath       ED Disposition  ED Disposition       ED Disposition   Discharge    Condition   Stable    Comment   --               Virginia Rey, GUILHERME  [address] [facility] [address] [phone]    Schedule an appointment as soon as possible for a visit       [facility] Emergency Department  [address] [place] [id number] [phone]    As needed, If symptoms worsen         Medication List      No changes were made to your prescriptions during this visit.            Hubert Cummings MD  06/02/23 2010

## 2023-06-02 NOTE — PROGRESS NOTES
Notified patient of improved heart pumping function (40% in 2021 up to 60-65%) but with moderate asymmetric septal hypertrophy but no significant outflow obstruction at this time.  This can cause chest pain especially if worsens.  She has grade 2 diastolic dysfunction or impaired relaxation that can cause heart failure.  She has moderate thickening of the left coronary cusp of the aortic valve and vegetation could not be excluded.  I recommended a transesophageal echocardiogram for further evaluation.  She has mild mitral regurgitation.  Left atrium was moderately enlarged.  Right ventricle was normal size and function but systolic pressure was mildly increased (mild pulmonary hypertension most likely secondary to previous pulmonary embolism).  Overall improved from 2021 but possible aortic valve vegetation needs further evaluation. She then notified me her BP dropped to 68/46 last night after taking 1/2 of Entresto. Didn't take nifidepine yesterday or today but took a clonidine yesterday morning. At the end of our conversation she reported having a fever of 100 last night with chills and sweats and anemia per recent labs. WBC was elevated at 12 5/20/23 and Hgb low at 7.9. I advised her to go to [facility] ER due to possibility of endocarditis/bacteremia/sepsis. She verbalized understanding. Notified on call Dr. Galeana.

## 2023-06-03 LAB
QT INTERVAL: 456 MS
QTC INTERVAL: 499 MS

## 2023-06-04 ENCOUNTER — TELEPHONE (OUTPATIENT)
Dept: CARDIOLOGY | Facility: CLINIC | Age: 68
End: 2023-06-04

## 2023-06-04 NOTE — TELEPHONE ENCOUNTER
Attempted to call patient x2, went to voicemail each time.  Wanted to discuss patient's echo results with her and how she was feeling since being evaluated in the ER.  We will reach out to her primary cardiology team.

## 2023-06-05 ENCOUNTER — APPOINTMENT (OUTPATIENT)
Dept: CARDIOLOGY | Facility: HOSPITAL | Age: 68
End: 2023-06-05
Payer: MEDICARE

## 2023-06-05 ENCOUNTER — HOSPITAL ENCOUNTER (INPATIENT)
Facility: HOSPITAL | Age: 68
LOS: 3 days | Discharge: HOME | End: 2023-06-08
Attending: EMERGENCY MEDICINE | Admitting: EMERGENCY MEDICINE
Payer: MEDICARE

## 2023-06-05 DIAGNOSIS — I50.22 CHRONIC SYSTOLIC HEART FAILURE: ICD-10-CM

## 2023-06-05 PROBLEM — E11.65 TYPE 2 DIABETES MELLITUS WITH HYPERGLYCEMIA, WITHOUT LONG-TERM CURRENT USE OF INSULIN: Chronic | Status: ACTIVE | Noted: 2023-05-17

## 2023-06-05 PROBLEM — I33.0 VEGETATION OF HEART VALVE: Status: ACTIVE | Noted: 2023-06-05

## 2023-06-05 PROBLEM — I33.0 VALVULAR VEGETATION: Status: ACTIVE | Noted: 2023-06-05

## 2023-06-05 PROBLEM — I33.0 VEGETATION OF HEART VALVE: Chronic | Status: ACTIVE | Noted: 2023-06-05

## 2023-06-05 PROBLEM — I10 ESSENTIAL HYPERTENSION: Chronic | Status: ACTIVE | Noted: 2019-10-02

## 2023-06-05 LAB
ANION GAP SERPL CALCULATED.3IONS-SCNC: 11 MMOL/L (ref 5–15)
BASOPHILS # BLD AUTO: 0.11 10*3/MM3 (ref 0–0.2)
BASOPHILS NFR BLD AUTO: 0.8 % (ref 0–1.5)
BH CV ECHO SHUNT ASSESSMENT PERFORMED (HIDDEN SCRIPTING): 1
BUN SERPL-MCNC: 9 MG/DL (ref 8–23)
BUN/CREAT SERPL: 17 (ref 7–25)
CALCIUM SPEC-SCNC: 8.7 MG/DL (ref 8.6–10.5)
CHLORIDE SERPL-SCNC: 100 MMOL/L (ref 98–107)
CO2 SERPL-SCNC: 26 MMOL/L (ref 22–29)
CREAT SERPL-MCNC: 0.53 MG/DL (ref 0.57–1)
DEPRECATED RDW RBC AUTO: 41.5 FL (ref 37–54)
EGFRCR SERPLBLD CKD-EPI 2021: 102.1 ML/MIN/1.73
EOSINOPHIL # BLD AUTO: 0.23 10*3/MM3 (ref 0–0.4)
EOSINOPHIL NFR BLD AUTO: 1.7 % (ref 0.3–6.2)
ERYTHROCYTE [DISTWIDTH] IN BLOOD BY AUTOMATED COUNT: 15.6 % (ref 12.3–15.4)
GLUCOSE BLDC GLUCOMTR-MCNC: 212 MG/DL (ref 70–130)
GLUCOSE SERPL-MCNC: 198 MG/DL (ref 65–99)
HCT VFR BLD AUTO: 31.1 % (ref 34–46.6)
HGB BLD-MCNC: 8.2 G/DL (ref 12–15.9)
LYMPHOCYTES # BLD AUTO: 2.79 10*3/MM3 (ref 0.7–3.1)
LYMPHOCYTES NFR BLD AUTO: 20 % (ref 19.6–45.3)
MCH RBC QN AUTO: 19.6 PG (ref 26.6–33)
MCHC RBC AUTO-ENTMCNC: 26.4 G/DL (ref 31.5–35.7)
MCV RBC AUTO: 74.4 FL (ref 79–97)
MONOCYTES # BLD AUTO: 0.82 10*3/MM3 (ref 0.1–0.9)
MONOCYTES NFR BLD AUTO: 5.9 % (ref 5–12)
NEUTROPHILS NFR BLD AUTO: 71.2 % (ref 42.7–76)
NEUTROPHILS NFR BLD AUTO: 9.93 10*3/MM3 (ref 1.7–7)
PLATELET # BLD AUTO: 456 10*3/MM3 (ref 140–450)
PMV BLD AUTO: 11.5 FL (ref 6–12)
POTASSIUM SERPL-SCNC: 3.8 MMOL/L (ref 3.5–5.2)
RBC # BLD AUTO: 4.18 10*6/MM3 (ref 3.77–5.28)
SODIUM SERPL-SCNC: 137 MMOL/L (ref 136–145)
WBC NRBC COR # BLD: 13.93 10*3/MM3 (ref 3.4–10.8)

## 2023-06-05 PROCEDURE — 80048 BASIC METABOLIC PNL TOTAL CA: CPT

## 2023-06-05 PROCEDURE — 93325 DOPPLER ECHO COLOR FLOW MAPG: CPT

## 2023-06-05 PROCEDURE — 25010000002 MIDAZOLAM PER 1 MG: Performed by: EMERGENCY MEDICINE

## 2023-06-05 PROCEDURE — 87040 BLOOD CULTURE FOR BACTERIA: CPT | Performed by: EMERGENCY MEDICINE

## 2023-06-05 PROCEDURE — 93312 ECHO TRANSESOPHAGEAL: CPT

## 2023-06-05 PROCEDURE — 93312 ECHO TRANSESOPHAGEAL: CPT | Performed by: EMERGENCY MEDICINE

## 2023-06-05 PROCEDURE — 93325 DOPPLER ECHO COLOR FLOW MAPG: CPT | Performed by: EMERGENCY MEDICINE

## 2023-06-05 PROCEDURE — 82948 REAGENT STRIP/BLOOD GLUCOSE: CPT

## 2023-06-05 PROCEDURE — 63710000001 INSULIN LISPRO (HUMAN) PER 5 UNITS

## 2023-06-05 PROCEDURE — 85025 COMPLETE CBC W/AUTO DIFF WBC: CPT

## 2023-06-05 PROCEDURE — 93321 DOPPLER ECHO F-UP/LMTD STD: CPT | Performed by: EMERGENCY MEDICINE

## 2023-06-05 PROCEDURE — 93321 DOPPLER ECHO F-UP/LMTD STD: CPT

## 2023-06-05 PROCEDURE — 25010000002 FENTANYL CITRATE (PF) 50 MCG/ML SOLUTION: Performed by: EMERGENCY MEDICINE

## 2023-06-05 RX ORDER — DULOXETIN HYDROCHLORIDE 30 MG/1
30 CAPSULE, DELAYED RELEASE ORAL DAILY
Status: DISCONTINUED | OUTPATIENT
Start: 2023-06-05 | End: 2023-06-05

## 2023-06-05 RX ORDER — TIZANIDINE 4 MG/1
4 TABLET ORAL EVERY 12 HOURS PRN
Status: DISCONTINUED | OUTPATIENT
Start: 2023-06-05 | End: 2023-06-08 | Stop reason: HOSPADM

## 2023-06-05 RX ORDER — ZOLPIDEM TARTRATE 5 MG/1
10 TABLET ORAL NIGHTLY PRN
Status: DISCONTINUED | OUTPATIENT
Start: 2023-06-05 | End: 2023-06-08 | Stop reason: HOSPADM

## 2023-06-05 RX ORDER — MIDAZOLAM HYDROCHLORIDE 1 MG/ML
INJECTION INTRAMUSCULAR; INTRAVENOUS
Status: DISPENSED
Start: 2023-06-05 | End: 2023-06-06

## 2023-06-05 RX ORDER — SODIUM CHLORIDE 0.9 % (FLUSH) 0.9 %
10 SYRINGE (ML) INJECTION EVERY 12 HOURS SCHEDULED
Status: DISCONTINUED | OUTPATIENT
Start: 2023-06-05 | End: 2023-06-08 | Stop reason: HOSPADM

## 2023-06-05 RX ORDER — SODIUM CHLORIDE 9 MG/ML
40 INJECTION, SOLUTION INTRAVENOUS AS NEEDED
Status: DISCONTINUED | OUTPATIENT
Start: 2023-06-05 | End: 2023-06-08 | Stop reason: HOSPADM

## 2023-06-05 RX ORDER — MONTELUKAST SODIUM 10 MG/1
10 TABLET ORAL NIGHTLY
Status: DISCONTINUED | OUTPATIENT
Start: 2023-06-05 | End: 2023-06-08 | Stop reason: HOSPADM

## 2023-06-05 RX ORDER — AMOXICILLIN 250 MG
2 CAPSULE ORAL 2 TIMES DAILY
Status: DISCONTINUED | OUTPATIENT
Start: 2023-06-05 | End: 2023-06-08 | Stop reason: HOSPADM

## 2023-06-05 RX ORDER — CLONIDINE HYDROCHLORIDE 0.1 MG/1
0.1 TABLET ORAL 3 TIMES DAILY
Status: ON HOLD | COMMUNITY
End: 2023-06-08 | Stop reason: SDUPTHER

## 2023-06-05 RX ORDER — MIDAZOLAM HYDROCHLORIDE 1 MG/ML
INJECTION INTRAMUSCULAR; INTRAVENOUS
Status: COMPLETED | OUTPATIENT
Start: 2023-06-05 | End: 2023-06-05

## 2023-06-05 RX ORDER — TRAMADOL HYDROCHLORIDE 50 MG/1
50 TABLET ORAL EVERY 6 HOURS PRN
Status: DISCONTINUED | OUTPATIENT
Start: 2023-06-05 | End: 2023-06-08 | Stop reason: HOSPADM

## 2023-06-05 RX ORDER — FENTANYL CITRATE 50 UG/ML
INJECTION, SOLUTION INTRAMUSCULAR; INTRAVENOUS
Status: DISPENSED
Start: 2023-06-05 | End: 2023-06-06

## 2023-06-05 RX ORDER — ATORVASTATIN CALCIUM 10 MG/1
20 TABLET, FILM COATED ORAL NIGHTLY
Status: DISCONTINUED | OUTPATIENT
Start: 2023-06-05 | End: 2023-06-08 | Stop reason: HOSPADM

## 2023-06-05 RX ORDER — CLONIDINE HYDROCHLORIDE 0.1 MG/1
0.1 TABLET ORAL 3 TIMES DAILY PRN
Status: DISCONTINUED | OUTPATIENT
Start: 2023-06-05 | End: 2023-06-08 | Stop reason: HOSPADM

## 2023-06-05 RX ORDER — DULOXETIN HYDROCHLORIDE 30 MG/1
30 CAPSULE, DELAYED RELEASE ORAL NIGHTLY
Status: DISCONTINUED | OUTPATIENT
Start: 2023-06-05 | End: 2023-06-08 | Stop reason: HOSPADM

## 2023-06-05 RX ORDER — INSULIN LISPRO 100 [IU]/ML
2-9 INJECTION, SOLUTION INTRAVENOUS; SUBCUTANEOUS
Status: DISCONTINUED | OUTPATIENT
Start: 2023-06-05 | End: 2023-06-08 | Stop reason: HOSPADM

## 2023-06-05 RX ORDER — NITROGLYCERIN 0.4 MG/1
0.4 TABLET SUBLINGUAL
Status: DISCONTINUED | OUTPATIENT
Start: 2023-06-05 | End: 2023-06-08 | Stop reason: HOSPADM

## 2023-06-05 RX ORDER — NICOTINE POLACRILEX 4 MG
15 LOZENGE BUCCAL
Status: DISCONTINUED | OUTPATIENT
Start: 2023-06-05 | End: 2023-06-08 | Stop reason: HOSPADM

## 2023-06-05 RX ORDER — BISACODYL 5 MG/1
5 TABLET, DELAYED RELEASE ORAL DAILY PRN
Status: DISCONTINUED | OUTPATIENT
Start: 2023-06-05 | End: 2023-06-08 | Stop reason: HOSPADM

## 2023-06-05 RX ORDER — FENTANYL CITRATE 50 UG/ML
INJECTION, SOLUTION INTRAMUSCULAR; INTRAVENOUS
Status: COMPLETED | OUTPATIENT
Start: 2023-06-05 | End: 2023-06-05

## 2023-06-05 RX ORDER — POLYETHYLENE GLYCOL 3350 17 G/17G
17 POWDER, FOR SOLUTION ORAL DAILY PRN
Status: DISCONTINUED | OUTPATIENT
Start: 2023-06-05 | End: 2023-06-08 | Stop reason: HOSPADM

## 2023-06-05 RX ORDER — METOPROLOL SUCCINATE 50 MG/1
50 TABLET, EXTENDED RELEASE ORAL 2 TIMES DAILY
Status: DISCONTINUED | OUTPATIENT
Start: 2023-06-05 | End: 2023-06-06

## 2023-06-05 RX ORDER — GLIPIZIDE 10 MG/1
10 TABLET ORAL
Status: DISCONTINUED | OUTPATIENT
Start: 2023-06-05 | End: 2023-06-08 | Stop reason: HOSPADM

## 2023-06-05 RX ORDER — SODIUM CHLORIDE 0.9 % (FLUSH) 0.9 %
10 SYRINGE (ML) INJECTION AS NEEDED
Status: DISCONTINUED | OUTPATIENT
Start: 2023-06-05 | End: 2023-06-08 | Stop reason: HOSPADM

## 2023-06-05 RX ORDER — ATORVASTATIN CALCIUM 10 MG/1
20 TABLET, FILM COATED ORAL DAILY
Status: DISCONTINUED | OUTPATIENT
Start: 2023-06-05 | End: 2023-06-05

## 2023-06-05 RX ORDER — PANTOPRAZOLE SODIUM 40 MG/1
40 TABLET, DELAYED RELEASE ORAL
Status: DISCONTINUED | OUTPATIENT
Start: 2023-06-06 | End: 2023-06-08 | Stop reason: HOSPADM

## 2023-06-05 RX ORDER — DEXTROSE MONOHYDRATE 25 G/50ML
25 INJECTION, SOLUTION INTRAVENOUS
Status: DISCONTINUED | OUTPATIENT
Start: 2023-06-05 | End: 2023-06-08 | Stop reason: HOSPADM

## 2023-06-05 RX ORDER — MONTELUKAST SODIUM 10 MG/1
10 TABLET ORAL DAILY
Status: DISCONTINUED | OUTPATIENT
Start: 2023-06-05 | End: 2023-06-05

## 2023-06-05 RX ORDER — BISACODYL 10 MG
10 SUPPOSITORY, RECTAL RECTAL DAILY PRN
Status: DISCONTINUED | OUTPATIENT
Start: 2023-06-05 | End: 2023-06-08 | Stop reason: HOSPADM

## 2023-06-05 RX ADMIN — GLIPIZIDE 10 MG: 10 TABLET ORAL at 18:56

## 2023-06-05 RX ADMIN — ATORVASTATIN CALCIUM 20 MG: 10 TABLET, FILM COATED ORAL at 20:50

## 2023-06-05 RX ADMIN — INSULIN LISPRO 2 UNITS: 100 INJECTION, SOLUTION INTRAVENOUS; SUBCUTANEOUS at 18:57

## 2023-06-05 RX ADMIN — MIDAZOLAM 2 MG: 1 INJECTION INTRAMUSCULAR; INTRAVENOUS at 15:46

## 2023-06-05 RX ADMIN — DULOXETINE HYDROCHLORIDE 30 MG: 30 CAPSULE, DELAYED RELEASE ORAL at 20:50

## 2023-06-05 RX ADMIN — ZOLPIDEM TARTRATE 10 MG: 5 TABLET ORAL at 20:51

## 2023-06-05 RX ADMIN — MIDAZOLAM 1 MG: 1 INJECTION INTRAMUSCULAR; INTRAVENOUS at 15:46

## 2023-06-05 RX ADMIN — TIZANIDINE 4 MG: 4 TABLET ORAL at 20:51

## 2023-06-05 RX ADMIN — MONTELUKAST SODIUM 10 MG: 10 TABLET, FILM COATED ORAL at 20:50

## 2023-06-05 RX ADMIN — CLONIDINE HYDROCHLORIDE 0.1 MG: 0.1 TABLET ORAL at 20:51

## 2023-06-05 RX ADMIN — METOPROLOL SUCCINATE 50 MG: 50 TABLET, EXTENDED RELEASE ORAL at 20:50

## 2023-06-05 RX ADMIN — FENTANYL CITRATE 50 MCG: 50 INJECTION, SOLUTION INTRAMUSCULAR; INTRAVENOUS at 15:46

## 2023-06-05 RX ADMIN — Medication 10 ML: at 20:52

## 2023-06-05 RX ADMIN — TRAMADOL HYDROCHLORIDE 50 MG: 50 TABLET, COATED ORAL at 20:51

## 2023-06-05 RX ADMIN — INSULIN LISPRO 4 UNITS: 100 INJECTION, SOLUTION INTRAVENOUS; SUBCUTANEOUS at 20:51

## 2023-06-05 RX ADMIN — FENTANYL CITRATE 50 MCG: 50 INJECTION, SOLUTION INTRAMUSCULAR; INTRAVENOUS at 15:44

## 2023-06-05 RX ADMIN — RIVAROXABAN 20 MG: 20 TABLET, FILM COATED ORAL at 20:50

## 2023-06-05 NOTE — TELEPHONE ENCOUNTER
Reached patient. Notified her, per Nancy Sethi NP, that we are going to bypass admission to the ED and do a direct admit. Nancy Sethi NP is going to contact Dr Galeana and on call cardiologist and notify them of situation. Notified her Nancy Sethi NP was concerned about vegetation seen on echo and possible infection. She voiced understanding.

## 2023-06-05 NOTE — NURSING NOTE
Patient was recently treated for fungal infection in esophagus. Patient also requires anti-fungals if placed on antibiotics as this typically causes her a yeast infection

## 2023-06-05 NOTE — PLAN OF CARE
Goal Outcome Evaluation:  Plan of Care Reviewed With: patient        Progress: no change  Outcome Evaluation: new DA. Patient remained NPO, plans for EVAN.

## 2023-06-05 NOTE — H&P
"[facility] -  History and Physical     LOS: 0 days   Patient Care Team:  Virginia Rey APRN as PCP - General (Family Medicine)  Provider, No Known as PCP - Family Medicine  Nancy Sethi APRN as Nurse Practitioner (Cardiology)    Chief Complaint: Fatigue, fever    Subjective     Interval History:   Patient presents from home today for direct admission due to echocardiogram findings.  Patient follows with Nancy Sethi for her primary cardiology care.  She is followed for chronic systolic congestive heart failure as well as hypertension.  Recently the patient had echocardiogram which revealed recovery of her EF to 60 to 65% when it was down to 40% 2021.  However on this echocardiogram it was noted that she had a moderate thickening of the left coronary cusp of the aortic valve and that vegetation could be a concern.  When she was contacted by Nancy the patient complained of low blood pressures and fevers.  Given this it was decided the patient necessitated admission for further work-up for potential endocarditis.    Upon my examination the patient is sitting up comfortably in the bed.  She says she has had some worsening of her fatigue and shortness of breath over the last 2 to 3 weeks.  She says she has developed a fever as well.  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    She denies any chest pain, palpitations, presyncope, or syncope.      Review of Systems:     Review of Systems   Constitutional:  Positive for fatigue.   Respiratory:  Positive for shortness of breath.    Cardiovascular:  Positive for leg swelling. Negative for chest pain and palpitations.     History  Past Medical History:   Diagnosis Date    Bradycardia     Cardiomyopathy     CHF (congestive heart failure)     Chronic kidney disease     Coronary arteriosclerosis     Diabetes mellitus  "    Diverticulitis     GERD (gastroesophageal reflux disease)     HLD (hyperlipidemia)     HTN (hypertension)     Hx of pulmonary embolus     Hypercholesterolemia     LBBB (left bundle branch block)     Obesity     Pulmonary embolism     Rectal bleeding     RLS (restless legs syndrome)     Vitamin D deficiency      Past Surgical History:   Procedure Laterality Date    APPENDECTOMY  1970    CARDIAC CATHETERIZATION      [place] [facility] [place] - mild, diffuse, non-obstructive CAD    CHOLECYSTECTOMY  1991    HYSTERECTOMY      LEG SURGERY Left 2015    CALCIFIED TUMOR    TUBAL ABDOMINAL LIGATION  1982    TUMOR REMOVAL       Family History   Problem Relation Age of Onset    Hypertension Mother     Heart disease Mother     Diabetes type II Mother     Hypertension Father     Heart disease Father     Diabetes type II Father     Hypertension Sister     Ulcerative colitis Sister     Stroke Sister     Hypertension Brother     Diabetes type II Brother     Heart disease Brother     Heart disease Maternal Grandmother     Heart disease Maternal Grandfather     Diabetes type II Paternal Grandmother     Heart disease Paternal Grandfather      Social History     Tobacco Use    Smoking status: Former     Packs/day: 0.25     Years: 20.00     Pack years: 5.00     Types: Cigarettes     Start date:      Quit date:      Years since quittin.4    Smokeless tobacco: Never   Vaping Use    Vaping Use: Former    Substances: Flavoring   Substance Use Topics    Alcohol use: Not Currently     Comment: OCC IN THE PAST    Drug use: Never     Medications Prior to Admission   Medication Sig Dispense Refill Last Dose    atorvastatin (LIPITOR) 20 MG tablet Take 1 tablet by mouth Daily.   2023    cloNIDine (CATAPRES) 0.1 MG tablet Take 1 tablet by mouth 3 (Three) Times a Day As Needed for High Blood Pressure (SBP greater than 160 or DBP greater 100). (Patient taking differently: Take 1 tablet by mouth 3 (Three) Times a Day.) 90 tablet  11 6/4/2023    diclofenac (VOLTAREN) 75 MG EC tablet Take 1 tablet by mouth Daily.   6/5/2023    Dulaglutide (Trulicity) 0.75 MG/0.5ML solution pen-injector Inject 0.75 mg under the skin into the appropriate area as directed 1 (One) Time Per Week.   Past Week    DULoxetine (CYMBALTA) 30 MG capsule Take 1 capsule by mouth Daily.   6/4/2023    furosemide (LASIX) 20 MG tablet Take 1 tablet by mouth Daily As Needed (swelling). 30 tablet 2     glipizide (GLUCOTROL) 10 MG tablet Take 1 tablet by mouth 2 (Two) Times a Day Before Meals.   6/5/2023    metoprolol succinate XL (TOPROL-XL) 50 MG 24 hr tablet Take 1 tablet by mouth Daily. Replaces metoprolol tartrate (Patient taking differently: Take 1 tablet by mouth 2 (Two) Times a Day. Replaces metoprolol tartrate) 30 tablet 11 6/5/2023    montelukast (SINGULAIR) 10 MG tablet Take 1 tablet by mouth Daily.   6/4/2023    nystatin (MYCOSTATIN) 072717 UNIT/GM powder Apply 1 application topically to the appropriate area as directed 4 (Four) Times a Day As Needed.       omeprazole (priLOSEC) 20 MG capsule Take 1 capsule by mouth Daily.   6/5/2023    rivaroxaban (XARELTO) 20 MG tablet Take 1 tablet by mouth Every Night.   6/4/2023    TiZANidine (ZANAFLEX) 4 MG capsule Take 1 capsule by mouth 3 (Three) Times a Day As Needed for Muscle Spasms. Usually takes twice a day   6/4/2023    traMADol (ULTRAM) 50 MG tablet Take 1 tablet by mouth Every 6 (Six) Hours As Needed for Moderate Pain.   6/5/2023    vitamin D (ERGOCALCIFEROL) 1.25 MG (26740 UT) capsule capsule Take 1 capsule by mouth 2 (Two) Times a Week.   Past Week    zolpidem (AMBIEN) 10 MG tablet Take 1 tablet by mouth every night at bedtime. EVERY NIGHT  1 6/4/2023    NIFEdipine CC (ADALAT CC) 60 MG 24 hr tablet Take 1 tablet by mouth Daily.        Allergies:  Cephalexin, Codeine, Levaquin [levofloxacin], Morphine, Penicillins, Aspirin, Cetirizine, Cheese, Ciprofloxacin, Gabapentin, Keppra [levetiracetam], Lisinopril, Meloxicam,  Vancomycin, and Sulfa antibiotics    Objective     Vital Sign Min/Max for last 24 hours  Temp  Min: 98 °F (36.7 °C)  Max: 98 °F (36.7 °C)   BP  Min: 185/85  Max: 185/85   Pulse  Min: 71  Max: 71   Resp  Min: 18  Max: 18   SpO2  Min: 100 %  Max: 100 %   No data recorded   Weight  Min: 98.4 kg (217 lb)  Max: 98.4 kg (217 lb)         06/05/23  1258   Weight: 98.4 kg (217 lb)       Physical Exam:    Constitutional:       Appearance: Healthy appearance. Not in distress.   Pulmonary:      Effort: Pulmonary effort is normal.      Breath sounds: Normal breath sounds.   Cardiovascular:      PMI at left midclavicular line. Normal rate. Regular rhythm.      Murmurs: There is no murmur.      No gallop.  No click. No rub.   Edema:     Peripheral edema present.     Ankle: bilateral 1+ edema of the ankle.     Feet: bilateral 1+ edema of the feet.  Abdominal:      General: Bowel sounds are normal.   Musculoskeletal: Normal range of motion.      Cervical back: Normal range of motion and neck supple. Skin:     General: Skin is warm.   Neurological:      Mental Status: Alert and oriented to person, place and time.     Results Review:   Lab Results (last 72 hours)       ** No results found for the last 72 hours. **             Results for orders placed in visit on 06/02/23    Adult Transthoracic Echo Complete W/ Cont if Necessary Per Protocol        Medication Review: yes  Current Facility-Administered Medications   Medication Dose Route Frequency Provider Last Rate Last Admin    atorvastatin (LIPITOR) tablet 20 mg  20 mg Oral Daily Steve Andres APRN        sennosides-docusate (PERICOLACE) 8.6-50 MG per tablet 2 tablet  2 tablet Oral BID Steve Andres APRN        And    polyethylene glycol (MIRALAX) packet 17 g  17 g Oral Daily PRN Steve Andres APRN        And    bisacodyl (DULCOLAX) EC tablet 5 mg  5 mg Oral Daily PRN Steve Andres APRN        And    bisacodyl (DULCOLAX) suppository 10 mg  10 mg Rectal Daily PRN  Steve Andres APRN        cloNIDine (CATAPRES) tablet 0.1 mg  0.1 mg Oral TID PRN Steve Andres APRN        dextrose (D50W) (25 g/50 mL) IV injection 25 g  25 g Intravenous Q15 Min PRN Steve Andres APRN        dextrose (GLUTOSE) oral gel 15 g  15 g Oral Q15 Min PRN Steve Andres APRN        DULoxetine (CYMBALTA) DR capsule 30 mg  30 mg Oral Daily Steve Andres APRN        glipizide (GLUCOTROL) tablet 10 mg  10 mg Oral BID AC Steve Andres APRN        glucagon (GLUCAGEN) injection 1 mg  1 mg Intramuscular Q15 Min PRN Steve Andres APRN        Insulin Lispro (humaLOG) injection 2-9 Units  2-9 Units Subcutaneous 4x Daily AC & at Bedtime Steve Andres APRN        metoprolol succinate XL (TOPROL-XL) 24 hr tablet 50 mg  50 mg Oral BID Steve Andres APRN        montelukast (SINGULAIR) tablet 10 mg  10 mg Oral Daily Steve Andres APRN        nitroglycerin (NITROSTAT) SL tablet 0.4 mg  0.4 mg Sublingual Q5 Min PRN Steve Andres APRN        [START ON 6/6/2023] pantoprazole (PROTONIX) EC tablet 40 mg  40 mg Oral Q AM Steve Andres APRN        rivaroxaban (XARELTO) tablet 20 mg  20 mg Oral Nightly Steve Andres APRN        sodium chloride 0.9 % flush 10 mL  10 mL Intravenous Q12H Steve Andres APRN        sodium chloride 0.9 % flush 10 mL  10 mL Intravenous PRN Steve Andres APRN        sodium chloride 0.9 % infusion 40 mL  40 mL Intravenous PRN Steve Andres APRN        tiZANidine (ZANAFLEX) tablet 4 mg  4 mg Oral Q12H PRN Steve Andres APRN        traMADol (ULTRAM) tablet 50 mg  50 mg Oral Q6H PRN Jake Hernandez DO        zolpidem (AMBIEN) tablet 10 mg  10 mg Oral Nightly PRN Frossard, Jake Alex, DO             Assessment & Plan       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  - N.p.o. for EVAN   -Depending on results could consider ID consult    Hypertension: Patient noted to be hypertensive upon admission.  - Resume home  medications  - As needed clonidine  - We will optimize medications during hospitalization.    Chronic systolic congestive heart failure: Patient had recent echocardiogram showing recovery of EF.  Chronic lower extremity swelling.  Dyspnea on exertion stable.  - We will continue GDMT of Toprol-XL 50 mg twice daily  - Patient was on Entresto prior to admission, consider reinitiating during admission depending on clinical course  - Strict intake and output  - Daily weights  - Has as needed Lasix at home, consider diuretics during hospitalization    Type 2 diabetes: Patient recently started Trulicity.  Blood sugars are not controlled at home.  - Resume home glipizide  - Accu-Cheks  - Sliding scale insulin.      VTE prophylaxis:  -SCDs ordered.  -Patient on Xarelto.    Further orders per Dr. Hernandez      Electronically signed by GUILHERME Holley, 06/05/23, 1:31 PM CDT.

## 2023-06-06 PROBLEM — D72.829 ELEVATED WHITE BLOOD CELL COUNT: Status: ACTIVE | Noted: 2023-06-06

## 2023-06-06 PROBLEM — I33.0 VEGETATION OF HEART VALVE: Status: RESOLVED | Noted: 2023-06-05 | Resolved: 2023-06-06

## 2023-06-06 PROBLEM — R50.9 FEVER: Status: ACTIVE | Noted: 2023-06-06

## 2023-06-06 PROBLEM — R00.0 TACHYCARDIA: Status: ACTIVE | Noted: 2023-06-06

## 2023-06-06 PROBLEM — I33.0 VALVULAR VEGETATION: Status: RESOLVED | Noted: 2023-06-05 | Resolved: 2023-06-06

## 2023-06-06 LAB
ANION GAP SERPL CALCULATED.3IONS-SCNC: 10 MMOL/L (ref 5–15)
B PARAPERT DNA SPEC QL NAA+PROBE: NOT DETECTED
B PERT DNA SPEC QL NAA+PROBE: NOT DETECTED
BACTERIA UR QL AUTO: ABNORMAL /HPF
BILIRUB UR QL STRIP: NEGATIVE
BUN SERPL-MCNC: 9 MG/DL (ref 8–23)
BUN/CREAT SERPL: 16.1 (ref 7–25)
C PNEUM DNA NPH QL NAA+NON-PROBE: NOT DETECTED
CALCIUM SPEC-SCNC: 9 MG/DL (ref 8.6–10.5)
CHLORIDE SERPL-SCNC: 100 MMOL/L (ref 98–107)
CLARITY UR: CLEAR
CO2 SERPL-SCNC: 27 MMOL/L (ref 22–29)
COLOR UR: YELLOW
CREAT SERPL-MCNC: 0.56 MG/DL (ref 0.57–1)
DEPRECATED RDW RBC AUTO: 40.2 FL (ref 37–54)
EGFRCR SERPLBLD CKD-EPI 2021: 100.8 ML/MIN/1.73
ERYTHROCYTE [DISTWIDTH] IN BLOOD BY AUTOMATED COUNT: 15.8 % (ref 12.3–15.4)
FLUAV SUBTYP SPEC NAA+PROBE: NOT DETECTED
FLUBV RNA ISLT QL NAA+PROBE: NOT DETECTED
GLUCOSE BLDC GLUCOMTR-MCNC: 213 MG/DL (ref 70–130)
GLUCOSE BLDC GLUCOMTR-MCNC: 221 MG/DL (ref 70–130)
GLUCOSE BLDC GLUCOMTR-MCNC: 231 MG/DL (ref 70–130)
GLUCOSE BLDC GLUCOMTR-MCNC: 232 MG/DL (ref 70–130)
GLUCOSE SERPL-MCNC: 249 MG/DL (ref 65–99)
GLUCOSE UR STRIP-MCNC: ABNORMAL MG/DL
HADV DNA SPEC NAA+PROBE: NOT DETECTED
HCOV 229E RNA SPEC QL NAA+PROBE: NOT DETECTED
HCOV HKU1 RNA SPEC QL NAA+PROBE: NOT DETECTED
HCOV NL63 RNA SPEC QL NAA+PROBE: NOT DETECTED
HCOV OC43 RNA SPEC QL NAA+PROBE: NOT DETECTED
HCT VFR BLD AUTO: 29.9 % (ref 34–46.6)
HGB BLD-MCNC: 8.2 G/DL (ref 12–15.9)
HGB UR QL STRIP.AUTO: NEGATIVE
HMPV RNA NPH QL NAA+NON-PROBE: NOT DETECTED
HPIV1 RNA ISLT QL NAA+PROBE: NOT DETECTED
HPIV2 RNA SPEC QL NAA+PROBE: NOT DETECTED
HPIV3 RNA NPH QL NAA+PROBE: NOT DETECTED
HPIV4 P GENE NPH QL NAA+PROBE: NOT DETECTED
HYALINE CASTS UR QL AUTO: ABNORMAL /LPF
KETONES UR QL STRIP: NEGATIVE
LEUKOCYTE ESTERASE UR QL STRIP.AUTO: ABNORMAL
M PNEUMO IGG SER IA-ACNC: NOT DETECTED
MCH RBC QN AUTO: 19.6 PG (ref 26.6–33)
MCHC RBC AUTO-ENTMCNC: 27.4 G/DL (ref 31.5–35.7)
MCV RBC AUTO: 71.5 FL (ref 79–97)
NITRITE UR QL STRIP: NEGATIVE
NT-PROBNP SERPL-MCNC: 1556 PG/ML (ref 0–900)
PH UR STRIP.AUTO: 6.5 [PH] (ref 5–8)
PLATELET # BLD AUTO: 483 10*3/MM3 (ref 140–450)
PMV BLD AUTO: 11.6 FL (ref 6–12)
POTASSIUM SERPL-SCNC: 3.5 MMOL/L (ref 3.5–5.2)
PROCALCITONIN SERPL-MCNC: 0.02 NG/ML (ref 0–0.25)
PROT UR QL STRIP: NEGATIVE
RBC # BLD AUTO: 4.18 10*6/MM3 (ref 3.77–5.28)
RBC # UR STRIP: ABNORMAL /HPF
REF LAB TEST METHOD: ABNORMAL
RHINOVIRUS RNA SPEC NAA+PROBE: NOT DETECTED
RSV RNA NPH QL NAA+NON-PROBE: NOT DETECTED
SARS-COV-2 RNA NPH QL NAA+NON-PROBE: NOT DETECTED
SODIUM SERPL-SCNC: 137 MMOL/L (ref 136–145)
SP GR UR STRIP: <=1.005 (ref 1–1.03)
SQUAMOUS #/AREA URNS HPF: ABNORMAL /HPF
UROBILINOGEN UR QL STRIP: ABNORMAL
WBC # UR STRIP: ABNORMAL /HPF
WBC NRBC COR # BLD: 14.69 10*3/MM3 (ref 3.4–10.8)

## 2023-06-06 PROCEDURE — 81001 URINALYSIS AUTO W/SCOPE: CPT | Performed by: NURSE PRACTITIONER

## 2023-06-06 PROCEDURE — 85027 COMPLETE CBC AUTOMATED: CPT | Performed by: NURSE PRACTITIONER

## 2023-06-06 PROCEDURE — 83880 ASSAY OF NATRIURETIC PEPTIDE: CPT | Performed by: NURSE PRACTITIONER

## 2023-06-06 PROCEDURE — 82948 REAGENT STRIP/BLOOD GLUCOSE: CPT

## 2023-06-06 PROCEDURE — 0202U NFCT DS 22 TRGT SARS-COV-2: CPT | Performed by: NURSE PRACTITIONER

## 2023-06-06 PROCEDURE — 80048 BASIC METABOLIC PNL TOTAL CA: CPT

## 2023-06-06 PROCEDURE — 25010000002 FUROSEMIDE PER 20 MG: Performed by: NURSE PRACTITIONER

## 2023-06-06 PROCEDURE — 84145 PROCALCITONIN (PCT): CPT | Performed by: NURSE PRACTITIONER

## 2023-06-06 PROCEDURE — 63710000001 INSULIN LISPRO (HUMAN) PER 5 UNITS

## 2023-06-06 RX ORDER — METOPROLOL SUCCINATE 50 MG/1
50 TABLET, EXTENDED RELEASE ORAL NIGHTLY
Status: COMPLETED | OUTPATIENT
Start: 2023-06-06 | End: 2023-06-06

## 2023-06-06 RX ORDER — NAPROXEN 500 MG/1
250 TABLET ORAL 2 TIMES DAILY WITH MEALS
Status: DISCONTINUED | OUTPATIENT
Start: 2023-06-06 | End: 2023-06-06

## 2023-06-06 RX ORDER — FUROSEMIDE 10 MG/ML
40 INJECTION INTRAMUSCULAR; INTRAVENOUS ONCE
Status: COMPLETED | OUTPATIENT
Start: 2023-06-06 | End: 2023-06-06

## 2023-06-06 RX ORDER — METOPROLOL SUCCINATE 100 MG/1
100 TABLET, EXTENDED RELEASE ORAL DAILY
Status: DISCONTINUED | OUTPATIENT
Start: 2023-06-07 | End: 2023-06-08 | Stop reason: HOSPADM

## 2023-06-06 RX ORDER — LOSARTAN POTASSIUM 50 MG/1
25 TABLET ORAL
Status: DISCONTINUED | OUTPATIENT
Start: 2023-06-06 | End: 2023-06-08 | Stop reason: HOSPADM

## 2023-06-06 RX ADMIN — ATORVASTATIN CALCIUM 20 MG: 10 TABLET, FILM COATED ORAL at 20:56

## 2023-06-06 RX ADMIN — DULOXETINE HYDROCHLORIDE 30 MG: 30 CAPSULE, DELAYED RELEASE ORAL at 20:57

## 2023-06-06 RX ADMIN — INSULIN LISPRO 4 UNITS: 100 INJECTION, SOLUTION INTRAVENOUS; SUBCUTANEOUS at 08:06

## 2023-06-06 RX ADMIN — TIZANIDINE 4 MG: 4 TABLET ORAL at 15:15

## 2023-06-06 RX ADMIN — DOCUSATE SODIUM 50 MG AND SENNOSIDES 8.6 MG 2 TABLET: 8.6; 5 TABLET, FILM COATED ORAL at 20:55

## 2023-06-06 RX ADMIN — METOPROLOL SUCCINATE 50 MG: 50 TABLET, EXTENDED RELEASE ORAL at 20:57

## 2023-06-06 RX ADMIN — FUROSEMIDE 40 MG: 10 INJECTION, SOLUTION INTRAVENOUS at 15:15

## 2023-06-06 RX ADMIN — RIVAROXABAN 20 MG: 20 TABLET, FILM COATED ORAL at 20:56

## 2023-06-06 RX ADMIN — GLIPIZIDE 10 MG: 10 TABLET ORAL at 17:24

## 2023-06-06 RX ADMIN — INSULIN LISPRO 4 UNITS: 100 INJECTION, SOLUTION INTRAVENOUS; SUBCUTANEOUS at 11:59

## 2023-06-06 RX ADMIN — Medication 10 ML: at 20:57

## 2023-06-06 RX ADMIN — Medication 10 ML: at 08:06

## 2023-06-06 RX ADMIN — TRAMADOL HYDROCHLORIDE 50 MG: 50 TABLET, COATED ORAL at 20:56

## 2023-06-06 RX ADMIN — LOSARTAN POTASSIUM 25 MG: 50 TABLET, FILM COATED ORAL at 11:59

## 2023-06-06 RX ADMIN — PANTOPRAZOLE SODIUM 40 MG: 40 TABLET, DELAYED RELEASE ORAL at 06:15

## 2023-06-06 RX ADMIN — EMPAGLIFLOZIN 10 MG: 10 TABLET, FILM COATED ORAL at 15:15

## 2023-06-06 RX ADMIN — INSULIN LISPRO 4 UNITS: 100 INJECTION, SOLUTION INTRAVENOUS; SUBCUTANEOUS at 17:24

## 2023-06-06 RX ADMIN — TRAMADOL HYDROCHLORIDE 50 MG: 50 TABLET, COATED ORAL at 08:14

## 2023-06-06 RX ADMIN — GLIPIZIDE 10 MG: 10 TABLET ORAL at 08:06

## 2023-06-06 RX ADMIN — METOPROLOL SUCCINATE 50 MG: 50 TABLET, EXTENDED RELEASE ORAL at 08:06

## 2023-06-06 RX ADMIN — MONTELUKAST SODIUM 10 MG: 10 TABLET, FILM COATED ORAL at 20:56

## 2023-06-06 RX ADMIN — INSULIN LISPRO 4 UNITS: 100 INJECTION, SOLUTION INTRAVENOUS; SUBCUTANEOUS at 20:57

## 2023-06-06 RX ADMIN — CLONIDINE HYDROCHLORIDE 0.1 MG: 0.1 TABLET ORAL at 15:31

## 2023-06-06 RX ADMIN — DOCUSATE SODIUM 50 MG AND SENNOSIDES 8.6 MG 2 TABLET: 8.6; 5 TABLET, FILM COATED ORAL at 08:06

## 2023-06-06 RX ADMIN — ZOLPIDEM TARTRATE 10 MG: 5 TABLET ORAL at 20:55

## 2023-06-06 NOTE — PAYOR COMM NOTE
"  6/6/23. [facility] [phone]    [phone]    ER ADMIT  ON 6/5/23. INPATIENT ORDER    FAXING CLINICAL FOR INPATIENT REVIEW.          Damaris Garcias (68 y.o. Female)       Date of Birth   1955    Social Security Number       Address   [address] [place] [address]    Home Phone   [phone]    MRN   [id number]       Mu-ism   [facility]    Marital Status                               Admission Date   6/5/23    Admission Type   Urgent    Admitting Provider   Jake Hernandez DO    Attending Provider   Jake Hernandez DO    Department, Room/Bed   [facility] 4B, 451/1       Discharge Date       Discharge Disposition       Discharge Destination                                 Attending Provider: Jake Hernandez DO    Allergies: Cephalexin, Cheese, Codeine, Levaquin [Levofloxacin], Morphine, Penicillins, Aspirin, Cetirizine, Ciprofloxacin, Gabapentin, Keppra [Levetiracetam], Lisinopril, Meloxicam, Vancomycin, Sulfa Antibiotics    Isolation: None   Infection: None   Code Status: CPR    Ht: 160 cm (63\")   Wt: 98.5 kg (217 lb 3.2 oz)    Admission Cmt: None   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]                   Active Insurance as of 6/5/2023       Primary Coverage       Payor Plan Insurance Group Employer/Plan Group    [place]        Payor Plan Address Payor Plan Phone Number Payor Plan Fax Number Effective Dates    [address]   7/1/2018 - None Entered    [facility] [phone]         Subscriber Name Subscriber Birth Date Member ID       DAMARIS GARCIAS 1955 [id number]               Secondary Coverage       Payor Plan Insurance Group Employer/Plan Group    [facility] MEDICARE REPLACEMENT [facility] DUAL COMPLETE MEDICARE REPLACEMENT [id number]       Payor Plan Address Payor Plan Phone Number Payor Plan Fax Number Effective Dates    [address] " [phone]  2023 - None Entered    [facility] [address]         Subscriber Name Subscriber Birth Date Member ID       LIZ MANZANARES 1955 [id number]                     Emergency Contacts        (Rel.) Home Phone Work Phone Mobile Phone    bertram bull (Son) [phone] -- --    MORALES VALDEZ (Daughter) [phone] -- --    Joanne Edmonds -- -- [phone]    brian bull (Son) -- -- [phone]    doron easton (Other) -- -- [phone]             [facility] Encounter Date/Time: 2023 [facility]   Hospital Account: [id number]    MRN: [id number]   Patient:  Liz Manzanares   Contact Serial #: [id number]   SSN:          ENCOUNTER             Patient Class: Inpatient   Unit: [address] Service: Cardiology     Bed: 451/1   Admitting Provider: Jake Hernandez*   Referring Physician: Jake Hernandez*   Attending Provider: Jake Hernandez*   Adm Diagnosis: Vegetation of heart valv*               PATIENT          Name: Liz Manzanares : 1955 (68 yrs)   Address: [address] [place] [address] Sex: Female   City: [place] [address]   County: [facility]   Marital Status:  Ethnicity: NOT                                                                              Race: WHITE   Primary Care Provider: Virginia Rey A* Patients Phone: Home Phone: [phone]     Mobile Phone: [phone]   EMERGENCY CONTACT   Contact Name Legal Guardian? Relationship to Patient Home Phone Work Phone   1. mlbertram  2. MORALES VALDEZ      Son  Daughter [phone]           GUARANTOR            Guarantor: Liz Manzanares     : 1955   Address: [address] [place] [address] Sex: [place] [address]     Relation to Patient: Self       Home Phone: [phone]   Guarantor ID: [id number]       Work Phone:     GUARANTOR EMPLOYER   Employer:           Status: RETIRED   COVERAGE          PRIMARY INSURANCE   [place]or[place] [employer] [place] [place] Plan:  [place] [facility] [place]   Group Number:   Insurance Type: INDEMNITY   Subscriber Name: LIZ MANZANARES Subscriber : 1955   Subscriber ID: [id number] Coverage Address: [address]  [place] [address]   Pat. Rel. to Subscriber: Self Coverage Phone:     SECONDARY INSURANCE   Payor: [facility] MEDICARE REPLACEMENT Plan: [facility] DUAL COMPLETE MEDICARE REPLACEMENT   Group Number: [id number] Insurance Type: INDEMNITY   Subscriber Name: LIZ MANZANARES Subscriber : 1955   Subscriber ID: [id number] Coverage Address: [address]  [place] [address]   Pat. Rel. to Subscriber: SELF Coverage Phone: [phone]      Contact Serial # ([id number])         2023    Chart ID ([id number])             Steve Andres APRN   Nurse Practitioner  Cardiology     H&P     Attested     Date of Service: 23  Creation Time: 23     Attested           Attestation signed by Jake Hernandez DO at 23 1823     I have reviewed this documentation and agree.        No evidence of any vegetation on any valve on the transesophageal echocardiogram.            Expand All Collapse All    [facility] -  History and Physical      LOS: 0 days   Patient Care Team:  Virginia Rey APRN as PCP - General (Family Medicine)  Provider, No Known as PCP - Family Medicine  Nancy Sethi APRN as Nurse Practitioner (Cardiology)     Chief Complaint: Fatigue, fever        Subjective         Interval History:   Patient presents from home today for direct admission due to echocardiogram findings.  Patient follows with Nancy Sethi for her primary cardiology care.  She is followed for chronic systolic congestive heart failure as well as hypertension.  Recently the patient had echocardiogram which revealed recovery of her EF to 60 to 65% when it was down to 40% .  However on this echocardiogram it was noted that she had a moderate thickening  "of the left coronary cusp of the aortic valve and that vegetation could be a concern.  When she was contacted by Nancy the patient complained of low blood pressures and fevers.  Given this it was decided the patient necessitated admission for further work-up for potential endocarditis.     Upon my examination the patient is sitting up comfortably in the bed.  She says she has had some worsening of her fatigue and shortness of breath over the last 2 to 3 weeks.  She says she has developed a fever as well.  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     She denies any chest pain, palpitations, presyncope, or syncope.        Review of Systems:      Review of Systems   Constitutional:  Positive for fatigue.   Respiratory:  Positive for shortness of breath.    Cardiovascular:  Positive for leg swelling. Negative for chest pain and palpitations.      History  Medical History[]Expand by Default        Past Medical History:   Diagnosis Date    Bradycardia      Cardiomyopathy      CHF (congestive heart failure)      Chronic kidney disease      Coronary arteriosclerosis      Diabetes mellitus      Diverticulitis      GERD (gastroesophageal reflux disease)      HLD (hyperlipidemia)      HTN (hypertension)      Hx of pulmonary embolus      Hypercholesterolemia      LBBB (left bundle branch block)      Obesity      Pulmonary embolism      Rectal bleeding      RLS (restless legs syndrome)      Vitamin D deficiency                            Review of Systems:      Review of Systems   Constitutional:  Positive for fatigue.   Respiratory:  Positive for shortness of breath.    Cardiovascular:  Positive for leg swelling. Negative for chest pain and palpitations.      History  Medical History[]Expand by Default        Past Medical History:   Diagnosis Date    Bradycardia      Cardiomyopathy      CHF (congestive heart " failure)      Chronic kidney disease      Coronary arteriosclerosis      Diabetes mellitus      Diverticulitis      GERD (gastroesophageal reflux disease)      HLD (hyperlipidemia)      HTN (hypertension)      Hx of pulmonary embolus      Hypercholesterolemia      LBBB (left bundle branch block)      Obesity      Pulmonary embolism      Rectal bleeding      RLS (restless legs syndrome)      Vitamin D deficiency                    st 72 hours)         ** No results found for the last 72 hours. **                Results for orders placed in visit on 06/02/23     Adult Transthoracic Echo Complete W/ Cont if Necessary Per Protocol                        Assessment & Plan           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  - N.p.o. for EVAN   -Depending on results could consider ID consult     Hypertension: Patient noted to be hypertensive upon admission.  - Resume home medications  - As needed clonidine  - We will optimize medications during hospitalization.     Chronic systolic congestive heart failure: Patient had recent echocardiogram showing recovery of EF.  Chronic lower extremity swelling.  Dyspnea on exertion stable.  - We will continue GDMT of Toprol-XL 50 mg twice daily  - Patient was on Entresto prior to admission, consider reinitiating during admission depending on clinical course  - Strict intake and output  - Daily weights  - Has as needed Lasix at home, consider diuretics during hospitalization     Type 2 diabetes: Patient recently started Trulicity.  Blood sugars are not controlled at home.  - Resume home glipizide  - Accu-Cheks  - Sliding scale insulin.        VTE prophylaxis:  -SCDs ordered.  -Patient on Xarelto.     Further orders per Dr. Hernandez        Electronically signed by GUILHERME Holley, 06/05/23, 1:31 PM CDT.        Pau Dickey, RN   Registered Nurse     Plan of Care     Signed     Date of Service: 06/06/23 0506  Creation Time: 06/06/23 0506     Signed         Goal Outcome  Evaluation:  Progress: improving  Outcome Evaluation: Pt a&ox4, BP elevated prn clonidine admin at bedtime BP decreased some, on room air lungs clear, up ad joan, continent of b&b; pt c/o chronic back pain and headache tramadol admin; BLE edema pt states she takes lasix prn at home, NSR 62-91 w/ first degree AV block, BBB, and PVCs. Pt slept well throughout the night. Safety maintained. Will continue to monitor.                         Jeison Forde APRN   Nurse Practitioner  Cardiology     Progress Notes      Cosign Needed     Date of Service: 06/06/23 1126  Creation Time: 06/06/23 1126     Cosign Needed       Expand All Collapse All    [facility] -  Progress Note      LOS: 1 day   Patient Care Team:  Virginia Rey APRN as PCP - General (Family Medicine)  Provider, No Known as PCP - Family Medicine  Nancy Sethi APRN as Nurse Practitioner (Cardiology)     Chief Complaint:Fever        Subjective         Interval History:   Patient was direct admitted to the ER for concerns of vegetation on transthoracic echo and fevers. Patient had her EVAN yesterday which showed no signs of vegetation. She has had no fevers since admission. She does have an elevated WBC. She does have remote pulmonary emboli and is anticoagulated with Xarelto. She does report some shortness of breath.      Review of Systems:      Review of Systems   Constitutional:  Positive for fatigue.   Respiratory:  Positive for shortness of breath.    Cardiovascular:  Positive for palpitations.         Objective []Expand by Default        Vital Sign Min/Max for last 24 hours  Temp  Min: 97.3 °F (36.3 °C)  Max: 98 °F (36.7 °C)   BP  Min: 145/51  Max: 188/69   Pulse  Min: 69  Max: 97   Resp  Min: 15  Max: 24   SpO2  Min: 94 %  Max: 100 %   No data recorded   Weight  Min: 98.4 kg (217 lb)  Max: 98.5 kg (217 lb 3.2 oz)      Vitals             06/05/23 2016   Weight: 98.5 kg (217 lb 3.2 oz)            Telemetry: NSR  currently with several episodes of tachycardia up to 140. Appears to be Atrial Fibrillation vs Atrial Flutter        Physical Exam:     Constitutional:       Appearance: Healthy appearance. Not in distress. Obese.   Eyes:      Pupils: Pupils are equal, round, and reactive to light.   HENT:      Head: Normocephalic and atraumatic.      Nose: Nose normal.    Mouth/Throat:      Dentition: Normal.   Pulmonary:      Effort: Pulmonary effort is normal.      Breath sounds: Normal breath sounds.   Chest:      Chest wall: Not tender to palpatation.   Cardiovascular:      PMI at left midclavicular line. Normal rate. Regular rhythm.      Murmurs: There is no murmur.   Pulses:     Intact distal pulses.   Edema:     Peripheral edema absent.   Abdominal:      General: Bowel sounds are normal.      Palpations: Abdomen is soft.   Musculoskeletal: Normal range of motion.      Cervical back: Normal range of motion. Skin:     General: Skin is warm and dry.   Neurological:      Mental Status: Alert, oriented to person, place, and time and oriented to person, place and time.   Psychiatric:         Attention and Perception: Attention normal.         Mood and Affect: Mood normal.      Results Review:   Lab Results (last 72 hours)         Procedure Component Value Units Date/Time     POC Glucose Once [[id number]]  (Abnormal) Collected: 06/06/23 0802     Specimen: Blood Updated: 06/06/23 0814       Glucose 232 mg/dL         Comment: : [id number] Hilliard ID: [id number]        Basic Metabolic Panel [[id number]]  (Abnormal) Collected: 06/06/23 0620     Specimen: Blood Updated: 06/06/23 0656       Glucose 249 mg/dL         BUN 9 mg/dL         Creatinine 0.56 mg/dL         Sodium 137 mmol/L         Potassium 3.5 mmol/L         Chloride 100 mmol/L         CO2 27.0 mmol/L         Calcium 9.0 mg/dL         BUN/Creatinine Ratio 16.1       Anion Gap 10.0 mmol/L         eGFR 100.8 mL/min/1.73       Narrative:       GFR Normal >60  Chronic  Kidney Disease <60  Kidney Failure <15        POC Glucose Once [[id number]]  (Abnormal) Collected: 06/05/23 2015     Specimen: Blood Updated: 06/05/23 2026       Glucose 212 mg/dL         Comment: : [id number] Noble Bowling ID: [id number]        Blood Culture - Blood, Arm, Left [[id number]] Collected: 06/05/23 1920     Specimen: Blood from Arm, Left Updated: 06/05/23 1944     Basic Metabolic Panel [[id number]]  (Abnormal) Collected: 06/05/23 1707     Specimen: Blood Updated: 06/05/23 1824       Glucose 198 mg/dL         BUN 9 mg/dL         Creatinine 0.53 mg/dL         Sodium 137 mmol/L         Potassium 3.8 mmol/L         Chloride 100 mmol/L         CO2 26.0 mmol/L         Calcium 8.7 mg/dL         BUN/Creatinine Ratio 17.0       Anion Gap 11.0 mmol/L         eGFR 102.1 mL/min/1.73       Narrative:       GFR Normal >60  Chronic Kidney Disease <60  Kidney Failure <15        CBC & Differential [[id number]]  (Abnormal) Collected: 06/05/23 1707     Specimen: Blood Updated: 06/05/23 1757     Narrative:       The following orders were created for panel order CBC & Differential.  Procedure                               Abnormality         Status                     ---------                               -----------         ------                     CBC Auto Differential[[id number]]        Abnormal            Final result                  Please view results for these tests on the individual orders.     CBC Auto Differential [[id number]]  (Abnormal) Collected: 06/05/23 1707     Specimen: Blood Updated: 06/05/23 1757       WBC 13.93 10*3/mm3         RBC 4.18 10*6/mm3         Hemoglobin 8.2 g/dL         Hematocrit 31.1 %         MCV 74.4 fL         MCH 19.6 pg         MCHC 26.4 g/dL         RDW 15.6 %         RDW-SD 41.5 fl         MPV 11.5 fL         Platelets 456 10*3/mm3         Neutrophil % 71.2 %         Lymphocyte % 20.0 %         Monocyte % 5.9 %         Eosinophil % 1.7 %         Basophil % 0.8 %          Neutrophils, Absolute 9.93 10*3/mm3         Lymphocytes, Absolute 2.79 10*3/mm3         Monocytes, Absolute 0.82 10*3/mm3         Eosinophils, Absolute 0.23 10*3/mm3         Basophils, Absolute 0.11 10*3/mm3               Results for orders placed during the hospital encounter of 06/05/23     Adult Transesophageal Echo (EVAN) W/ Cont if Necessary Per Protocol     Interpretation Summary    Left ventricular systolic function is normal. Left ventricular ejection fraction appears to be 61 - 65%.    Left ventricular wall thickness is consistent with hypertrophy.    No evidence of a left atrial appendage thrombus was present.    Saline test results are negative for right to left atrial level shunt.    Here is moderate calcification of the aortic valve mainly affecting the left coronary cusp(s). Trace aortic valve regurgitation is present.    There is no evidence of an aortic valve mass is present. No evidence of an aortic valve abscess is present.    Mild mitral valve regurgitation is present with multiple jets noted.     Medication Review: yes  Current Medications             Current Facility-Administered Medications   Medication Dose Route Frequency Provider Last Rate Last Admin    atorvastatin (LIPITOR) tablet 20 mg  20 mg Oral Nightly Jake Hernandez DO   20 mg at 06/05/23 2050    sennosides-docusate (PERICOLACE) 8.6-50 MG per tablet 2 tablet  2 tablet Oral BID Steve Andres APRN   2 tablet at 06/06/23 0806     And    polyethylene glycol (MIRALAX) packet 17 g  17 g Oral Daily PRN Steve Andres APRN         And    bisacodyl (DULCOLAX) EC tablet 5 mg  5 mg Oral Daily PRN Steve Andres APRN         And    bisacodyl (DULCOLAX) suppository 10 mg  10 mg Rectal Daily PRN Steve Andres APRN        cloNIDine (CATAPRES) tablet 0.1 mg  0.1 mg Oral TID PRN Steve Andres APRN   0.1 mg at 06/05/23 2051    dextrose (D50W) (25 g/50 mL) IV injection 25 g  25 g Intravenous Q15 Min PRN Steve Andres  GUILHERME        dextrose (GLUTOSE) oral gel 15 g  15 g Oral Q15 Min PRN Steve Andres APRN        DULoxetine (CYMBALTA) DR capsule 30 mg  30 mg Oral Nightly Jake Hernandez, DO   30 mg at 06/05/23 2050    glipizide (GLUCOTROL) tablet 10 mg  10 mg Oral BID AC ReasSteve barone APRN   10 mg at 06/06/23 0806    glucagon (GLUCAGEN) injection 1 mg  1 mg Intramuscular Q15 Min PRN Steve Andres APRN        Insulin Lispro (humaLOG) injection 2-9 Units  2-9 Units Subcutaneous 4x Daily AC & at Bedtime Steve Andres APRN   4 Units at 06/06/23 0806    losartan (COZAAR) tablet 25 mg  25 mg Oral Q24H Jeison Forde APRN        metoprolol succinate XL (TOPROL-XL) 24 hr tablet 100 mg  100 mg Oral QAM Jeison Forde APRN        metoprolol succinate XL (TOPROL-XL) 24 hr tablet 50 mg  50 mg Oral Nightly Jeison Forde APRN        montelukast (SINGULAIR) tablet 10 mg  10 mg Oral Nightly Jake Hernadnez, DO   10 mg at 06/05/23 2050    nitroglycerin (NITROSTAT) SL tablet 0.4 mg  0.4 mg Sublingual Q5 Min PRN Steve Andres APRN        pantoprazole (PROTONIX) EC tablet 40 mg  40 mg Oral Q AM Steve Andres APRN   40 mg at 06/06/23 0615    rivaroxaban (XARELTO) tablet 20 mg  20 mg Oral Nightly ReasSteve barone APRN   20 mg at 06/05/23 2050    sodium chloride 0.9 % flush 10 mL  10 mL Intravenous Q12H ReasSteve barone APRN   10 mL at 06/06/23 0806    sodium chloride 0.9 % flush 10 mL  10 mL Intravenous PRN Steve Andres APRN        sodium chloride 0.9 % infusion 40 mL  40 mL Intravenous PRN Steve Andres APRN        tiZANidine (ZANAFLEX) tablet 4 mg  4 mg Oral Q12H PRN Steve Andres APRN   4 mg at 06/05/23 2051    traMADol (ULTRAM) tablet 50 mg  50 mg Oral Q6H PRN Jake Hernandez DO   50 mg at 06/06/23 0814    zolpidem (AMBIEN) tablet 10 mg  10 mg Oral Nightly PRN Jake Hernandez DO   10 mg at 06/05/23 2051                     Assessment & Plan          Essential  hypertension    Chronic systolic heart failure    Type 2 diabetes mellitus with hyperglycemia, without long-term current use of insulin    Tachycardia    Elevated white blood cell count    Fever     Plan:  Fever- no fever since admission. Highest at home was 100.8. Unclear cause. Will check urinalysis, CBC and procalcitonin. No vegetation on EVAN. Check PCR Panel.      Leukocytosis - 12.77 and 13.93. will recheck today. No clear cause. No vegetation on VEAN     Tachycardia- with episodes of elevated heart rate 120-140 with heart racing. She notes she has had palpitations/heart racing over the last 1-2 weeks. This appears to be Atrial flutter vs Atrial Fibrillation. [id number] score is 4 given heart failure, female, age and hypertension. Will start anticoagulation. Increase Toprol XL from 50 BID to 100 in am and 50 in pm. Follows with Nancy VANEGAS as her primary cardiologist.      Chronic systolic congestive heart failure with a now recovered LVEF. Previously LVEF was 37% and 40%. Intolerant to Entresto due to hypotension. Also notes intolerant to Losartan in the past. Appears euvolemic on exam.      Hypertension- blood pressure is running high. Will start Losartan. Intolerant to Entresto.      Pulmonary Embolism- she notes that she is anticoagulated with Xarelto.      Possibly home tomorrow        Electronically signed by GUILHERME Ge, 06/06/23, 11:26 AM CDT.            5/23 15:48:10 -- 72 19 179/100 -- -- 95   06/05/23 15:34:22 -- 70 24 188/69 Abnormal  -- -- 97   06/05/23 1526 -- -- -- 168/89 Sitting -- --   06/05/23 1522 -- -- -- 185/85 Abnormal  -- -- --   06/05/23 1514 97.3 (36.3) 71 18 -- Sitting room air 99   06/05/23 1301 98 (36.7) 71 18 185/85 Abnormal   Sitting room air 100   BP: RN notified at 06/05/23 1301                                         Current Facility-Administered Medications   Medication Dose Route Frequency Provider Last Rate Last Admin    atorvastatin (LIPITOR) tablet 20 mg   20 mg Oral Nightly Jake Hernandez, DO   20 mg at 06/05/23 2050    sennosides-docusate (PERICOLACE) 8.6-50 MG per tablet 2 tablet  2 tablet Oral BID Steve Andres APRN   2 tablet at 06/06/23 0806    And    polyethylene glycol (MIRALAX) packet 17 g  17 g Oral Daily PRN Steve Andres APRN        And    bisacodyl (DULCOLAX) EC tablet 5 mg  5 mg Oral Daily PRN Steve Andres APRN        And    bisacodyl (DULCOLAX) suppository 10 mg  10 mg Rectal Daily PRN Steve Andres APRN        cloNIDine (CATAPRES) tablet 0.1 mg  0.1 mg Oral TID PRN Steve Andres APRN   0.1 mg at 06/05/23 2051    dextrose (D50W) (25 g/50 mL) IV injection 25 g  25 g Intravenous Q15 Min PRN Steve Andres APRN        dextrose (GLUTOSE) oral gel 15 g  15 g Oral Q15 Min PRN Steve Andres APRN        DULoxetine (CYMBALTA) DR capsule 30 mg  30 mg Oral Nightly Jake Hernandez DO   30 mg at 06/05/23 2050    glipizide (GLUCOTROL) tablet 10 mg  10 mg Oral BID AC Steve Andres APRN   10 mg at 06/06/23 0806    glucagon (GLUCAGEN) injection 1 mg  1 mg Intramuscular Q15 Min PRN Steve Andres APRN        Insulin Lispro (humaLOG) injection 2-9 Units  2-9 Units Subcutaneous 4x Daily AC & at Bedtime Steve Andres APRN   4 Units at 06/06/23 0806    losartan (COZAAR) tablet 25 mg  25 mg Oral Q24H Jeison Forde APRN        [START ON 6/7/2023] metoprolol succinate XL (TOPROL-XL) 24 hr tablet 100 mg  100 mg Oral Daily Jeison Forde APRN        metoprolol succinate XL (TOPROL-XL) 24 hr tablet 50 mg  50 mg Oral Nightly Jeison Forde APRN        montelukast (SINGULAIR) tablet 10 mg  10 mg Oral Nightly Frossard, Jake Alex, DO   10 mg at 06/05/23 2050    nitroglycerin (NITROSTAT) SL tablet 0.4 mg  0.4 mg Sublingual Q5 Min PRN Steve Andres APRN        pantoprazole (PROTONIX) EC tablet 40 mg  40 mg Oral Q AM Steve Andres APRN   40 mg at 06/06/23 0615    rivaroxaban (XARELTO) tablet 20 mg  20 mg  Oral Nightly ReasorSteve, APRN   20 mg at 06/05/23 2050    sodium chloride 0.9 % flush 10 mL  10 mL Intravenous Q12H Reasor, Steve S, APRN   10 mL at 06/06/23 0806    sodium chloride 0.9 % flush 10 mL  10 mL Intravenous PRN Reasor, Steve VILLEGAS, APRN        sodium chloride 0.9 % infusion 40 mL  40 mL Intravenous PRN Reasor, Steve S, APRN        tiZANidine (ZANAFLEX) tablet 4 mg  4 mg Oral Q12H PRN ReasorSteve, APRN   4 mg at 06/05/23 2051    traMADol (ULTRAM) tablet 50 mg  50 mg Oral Q6H PRN Jake Hernandez, DO   50 mg at 06/06/23 0814    zolpidem (AMBIEN) tablet 10 mg  10 mg Oral Nightly PRN Jake Hernandez, DO   10 mg at 06/05/23 2051

## 2023-06-06 NOTE — PROGRESS NOTES
[facility] -  Progress Note     LOS: 1 day   Patient Care Team:  Virginia Rey APRN as PCP - General (Family Medicine)  Provider, No Known as PCP - Family Medicine  Nancy Sethi APRN as Nurse Practitioner (Cardiology)    Chief Complaint:Fever    Subjective     Interval History:   Patient was direct admitted to the ER for concerns of vegetation on transthoracic echo and fevers. Patient had her EVAN yesterday which showed no signs of vegetation. She has had no fevers since admission. She does have an elevated WBC. She does have remote pulmonary emboli and is anticoagulated with Xarelto. She does report some shortness of breath.     Review of Systems:     Review of Systems   Constitutional:  Positive for fatigue.   Respiratory:  Positive for shortness of breath.    Cardiovascular:  Positive for palpitations.   Objective     Vital Sign Min/Max for last 24 hours  Temp  Min: 97.3 °F (36.3 °C)  Max: 98 °F (36.7 °C)   BP  Min: 145/51  Max: 188/69   Pulse  Min: 69  Max: 97   Resp  Min: 15  Max: 24   SpO2  Min: 94 %  Max: 100 %   No data recorded   Weight  Min: 98.4 kg (217 lb)  Max: 98.5 kg (217 lb 3.2 oz)         06/05/23 2016   Weight: 98.5 kg (217 lb 3.2 oz)       Telemetry: NSR currently with several episodes of tachycardia up to 140. Appears to be Atrial Fibrillation vs Atrial Flutter      Physical Exam:    Constitutional:       Appearance: Healthy appearance. Not in distress. Obese.   Eyes:      Pupils: Pupils are equal, round, and reactive to light.   HENT:      Head: Normocephalic and atraumatic.      Nose: Nose normal.    Mouth/Throat:      Dentition: Normal.   Pulmonary:      Effort: Pulmonary effort is normal.      Breath sounds: Normal breath sounds.   Chest:      Chest wall: Not tender to palpatation.   Cardiovascular:      PMI at left midclavicular line. Normal rate. Regular rhythm.      Murmurs: There is no murmur.   Pulses:     Intact distal pulses.   Edema:      Peripheral edema present.     Ankle: bilateral 2+ pitting edema of the ankle.  Abdominal:      General: Bowel sounds are normal.      Palpations: Abdomen is soft.   Musculoskeletal: Normal range of motion.      Cervical back: Normal range of motion. Skin:     General: Skin is warm and dry.   Neurological:      Mental Status: Alert, oriented to person, place, and time and oriented to person, place and time.   Psychiatric:         Attention and Perception: Attention normal.         Mood and Affect: Mood normal.     Results Review:   Lab Results (last 72 hours)       Procedure Component Value Units Date/Time    POC Glucose Once [[id number]]  (Abnormal) Collected: 06/06/23 0802    Specimen: Blood Updated: 06/06/23 0814     Glucose 232 mg/dL      Comment: : [id number] Juan Pablo JonesaMeter ID: [id number]       Basic Metabolic Panel [[id number]]  (Abnormal) Collected: 06/06/23 0620    Specimen: Blood Updated: 06/06/23 0656     Glucose 249 mg/dL      BUN 9 mg/dL      Creatinine 0.56 mg/dL      Sodium 137 mmol/L      Potassium 3.5 mmol/L      Chloride 100 mmol/L      CO2 27.0 mmol/L      Calcium 9.0 mg/dL      BUN/Creatinine Ratio 16.1     Anion Gap 10.0 mmol/L      eGFR 100.8 mL/min/1.73     Narrative:      GFR Normal >60  Chronic Kidney Disease <60  Kidney Failure <15      POC Glucose Once [[id number]]  (Abnormal) Collected: 06/05/23 2015    Specimen: Blood Updated: 06/05/23 2026     Glucose 212 mg/dL      Comment: : [id number] [facility] [id number]       Blood Culture - Blood, Arm, Left [[id number]] Collected: 06/05/23 1920    Specimen: Blood from Arm, Left Updated: 06/05/23 1944    Basic Metabolic Panel [[id number]]  (Abnormal) Collected: 06/05/23 1707    Specimen: Blood Updated: 06/05/23 1824     Glucose 198 mg/dL      BUN 9 mg/dL      Creatinine 0.53 mg/dL      Sodium 137 mmol/L      Potassium 3.8 mmol/L      Chloride 100 mmol/L      CO2 26.0 mmol/L      Calcium 8.7 mg/dL      BUN/Creatinine Ratio 17.0      Anion Gap 11.0 mmol/L      eGFR 102.1 mL/min/1.73     Narrative:      GFR Normal >60  Chronic Kidney Disease <60  Kidney Failure <15      CBC & Differential [[id number]]  (Abnormal) Collected: 06/05/23 1707    Specimen: Blood Updated: 06/05/23 1757    Narrative:      The following orders were created for panel order CBC & Differential.  Procedure                               Abnormality         Status                     ---------                               -----------         ------                     CBC Auto Differential[[id number]]        Abnormal            Final result                 Please view results for these tests on the individual orders.    CBC Auto Differential [[id number]]  (Abnormal) Collected: 06/05/23 1707    Specimen: Blood Updated: 06/05/23 1757     WBC 13.93 10*3/mm3      RBC 4.18 10*6/mm3      Hemoglobin 8.2 g/dL      Hematocrit 31.1 %      MCV 74.4 fL      MCH 19.6 pg      MCHC 26.4 g/dL      RDW 15.6 %      RDW-SD 41.5 fl      MPV 11.5 fL      Platelets 456 10*3/mm3      Neutrophil % 71.2 %      Lymphocyte % 20.0 %      Monocyte % 5.9 %      Eosinophil % 1.7 %      Basophil % 0.8 %      Neutrophils, Absolute 9.93 10*3/mm3      Lymphocytes, Absolute 2.79 10*3/mm3      Monocytes, Absolute 0.82 10*3/mm3      Eosinophils, Absolute 0.23 10*3/mm3      Basophils, Absolute 0.11 10*3/mm3           Results for orders placed during the hospital encounter of 06/05/23    Adult Transesophageal Echo (EVAN) W/ Cont if Necessary Per Protocol    Interpretation Summary    Left ventricular systolic function is normal. Left ventricular ejection fraction appears to be 61 - 65%.    Left ventricular wall thickness is consistent with hypertrophy.    No evidence of a left atrial appendage thrombus was present.    Saline test results are negative for right to left atrial level shunt.    Here is moderate calcification of the aortic valve mainly affecting the left coronary cusp(s). Trace aortic valve regurgitation is  present.    There is no evidence of an aortic valve mass is present. No evidence of an aortic valve abscess is present.    Mild mitral valve regurgitation is present with multiple jets noted.     Medication Review: yes  Current Facility-Administered Medications   Medication Dose Route Frequency Provider Last Rate Last Admin    atorvastatin (LIPITOR) tablet 20 mg  20 mg Oral Nightly Jake Hernandez, DO   20 mg at 06/05/23 2050    sennosides-docusate (PERICOLACE) 8.6-50 MG per tablet 2 tablet  2 tablet Oral BID Steve Andres APRN   2 tablet at 06/06/23 0806    And    polyethylene glycol (MIRALAX) packet 17 g  17 g Oral Daily PRN Steve Andres APRN        And    bisacodyl (DULCOLAX) EC tablet 5 mg  5 mg Oral Daily PRN Steve Andres APRN        And    bisacodyl (DULCOLAX) suppository 10 mg  10 mg Rectal Daily PRN Steve Andres APRN        cloNIDine (CATAPRES) tablet 0.1 mg  0.1 mg Oral TID PRN Steve Andres APRN   0.1 mg at 06/05/23 2051    dextrose (D50W) (25 g/50 mL) IV injection 25 g  25 g Intravenous Q15 Min PRN Steve Andres APRN        dextrose (GLUTOSE) oral gel 15 g  15 g Oral Q15 Min PRN Steve Andres APRN        DULoxetine (CYMBALTA) DR capsule 30 mg  30 mg Oral Nightly Jake Hernandez DO   30 mg at 06/05/23 2050    glipizide (GLUCOTROL) tablet 10 mg  10 mg Oral BID AC Steve Andres APRN   10 mg at 06/06/23 0806    glucagon (GLUCAGEN) injection 1 mg  1 mg Intramuscular Q15 Min PRN Steve Andres APRN        Insulin Lispro (humaLOG) injection 2-9 Units  2-9 Units Subcutaneous 4x Daily AC & at Bedtime Steve Andres APRN   4 Units at 06/06/23 0806    losartan (COZAAR) tablet 25 mg  25 mg Oral Q24H Jeison Forde APRN        metoprolol succinate XL (TOPROL-XL) 24 hr tablet 100 mg  100 mg Oral QAM Jeison Forde APRN        metoprolol succinate XL (TOPROL-XL) 24 hr tablet 50 mg  50 mg Oral Nightly Jeison Forde APRN        montelukast (SINGULAIR)  tablet 10 mg  10 mg Oral Nightly Jake Hernandez, DO   10 mg at 06/05/23 2050    nitroglycerin (NITROSTAT) SL tablet 0.4 mg  0.4 mg Sublingual Q5 Min PRN Steve Andres, APRN        pantoprazole (PROTONIX) EC tablet 40 mg  40 mg Oral Q AM ReasorSteve S, APRN   40 mg at 06/06/23 0615    rivaroxaban (XARELTO) tablet 20 mg  20 mg Oral Nightly ReasorSteve S, APRN   20 mg at 06/05/23 2050    sodium chloride 0.9 % flush 10 mL  10 mL Intravenous Q12H Reasor, Steve S, APRN   10 mL at 06/06/23 0806    sodium chloride 0.9 % flush 10 mL  10 mL Intravenous PRN Vivianeor, Steve VILLEGAS, APRN        sodium chloride 0.9 % infusion 40 mL  40 mL Intravenous PRN Steve Andres S, APRN        tiZANidine (ZANAFLEX) tablet 4 mg  4 mg Oral Q12H PRN Steve Andres, APRN   4 mg at 06/05/23 2051    traMADol (ULTRAM) tablet 50 mg  50 mg Oral Q6H PRN Jake Hernandez, DO   50 mg at 06/06/23 0814    zolpidem (AMBIEN) tablet 10 mg  10 mg Oral Nightly PRN Jake Hernandez, DO   10 mg at 06/05/23 2051         Assessment & Plan       Essential hypertension    Chronic systolic heart failure    Type 2 diabetes mellitus with hyperglycemia, without long-term current use of insulin    Tachycardia    Elevated white blood cell count    Fever    Plan:  Fever- no fever since admission. Highest at home was 100.8. Unclear cause. Will check urinalysis, CBC and procalcitonin. No vegetation on EVAN. Check PCR Panel.     Leukocytosis - 12.77 and 13.93. will recheck today. No clear cause. No vegetation on EVAN    Tachycardia- with episodes of elevated heart rate 120-140 with heart racing. She notes she has had palpitations/heart racing over the last 1-2 weeks. This appears to be Atrial flutter vs Atrial Fibrillation. [id number] score is 4 given heart failure, female, age and hypertension. Will start anticoagulation. Increase Toprol XL from 50 BID to 100 in am and 50 in pm. Follows with Nancy VANEGAS as her primary  cardiologist.     Chronic systolic congestive heart failure with a now recovered LVEF. Previously LVEF was 37% and 40%. Intolerant to Entresto due to hypotension. Also notes intolerant to Losartan in the past. Pitting edema. IV Lasix x 1. Will add Jardiance.     Hypertension- blood pressure is running high. Will start Losartan. Intolerant to Entresto.     Pulmonary Embolism- she notes that she is anticoagulated with Xarelto.       Electronically signed by GUILHERME Ge, 06/06/23, 11:26 AM CDT.

## 2023-06-06 NOTE — PLAN OF CARE
Goal Outcome Evaluation:           Progress: improving  Outcome Evaluation: Pt a&ox4, BP elevated prn clonidine admin at bedtime BP decreased some, on room air lungs clear, up ad joan, continent of b&b; pt c/o chronic back pain and headache tramadol admin; BLE edema pt states she takes lasix prn at home, NSR 62-91 w/ first degree AV block, BBB, and PVCs. Pt slept well throughout the night. Safety maintained. Will continue to monitor.

## 2023-06-07 LAB
ANION GAP SERPL CALCULATED.3IONS-SCNC: 12 MMOL/L (ref 5–15)
BASOPHILS # BLD AUTO: 0.13 10*3/MM3 (ref 0–0.2)
BASOPHILS NFR BLD AUTO: 0.8 % (ref 0–1.5)
BUN SERPL-MCNC: 16 MG/DL (ref 8–23)
BUN/CREAT SERPL: 23.5 (ref 7–25)
CALCIUM SPEC-SCNC: 9.5 MG/DL (ref 8.6–10.5)
CHLORIDE SERPL-SCNC: 99 MMOL/L (ref 98–107)
CO2 SERPL-SCNC: 27 MMOL/L (ref 22–29)
CREAT SERPL-MCNC: 0.68 MG/DL (ref 0.57–1)
CRP SERPL-MCNC: 0.41 MG/DL (ref 0–0.5)
DEPRECATED RDW RBC AUTO: 41.1 FL (ref 37–54)
EGFRCR SERPLBLD CKD-EPI 2021: 96.2 ML/MIN/1.73
EOSINOPHIL # BLD AUTO: 0.27 10*3/MM3 (ref 0–0.4)
EOSINOPHIL NFR BLD AUTO: 1.6 % (ref 0.3–6.2)
ERYTHROCYTE [DISTWIDTH] IN BLOOD BY AUTOMATED COUNT: 16 % (ref 12.3–15.4)
ERYTHROCYTE [SEDIMENTATION RATE] IN BLOOD: 35 MM/HR (ref 0–30)
GLUCOSE BLDC GLUCOMTR-MCNC: 180 MG/DL (ref 70–130)
GLUCOSE BLDC GLUCOMTR-MCNC: 188 MG/DL (ref 70–130)
GLUCOSE BLDC GLUCOMTR-MCNC: 190 MG/DL (ref 70–130)
GLUCOSE BLDC GLUCOMTR-MCNC: 266 MG/DL (ref 70–130)
GLUCOSE SERPL-MCNC: 187 MG/DL (ref 65–99)
HCT VFR BLD AUTO: 32.5 % (ref 34–46.6)
HGB BLD-MCNC: 8.7 G/DL (ref 12–15.9)
LYMPHOCYTES # BLD AUTO: 2.88 10*3/MM3 (ref 0.7–3.1)
LYMPHOCYTES NFR BLD AUTO: 16.9 % (ref 19.6–45.3)
MCH RBC QN AUTO: 19.4 PG (ref 26.6–33)
MCHC RBC AUTO-ENTMCNC: 26.8 G/DL (ref 31.5–35.7)
MCV RBC AUTO: 72.5 FL (ref 79–97)
MONOCYTES # BLD AUTO: 1.1 10*3/MM3 (ref 0.1–0.9)
MONOCYTES NFR BLD AUTO: 6.5 % (ref 5–12)
NEUTROPHILS NFR BLD AUTO: 12.59 10*3/MM3 (ref 1.7–7)
NEUTROPHILS NFR BLD AUTO: 73.7 % (ref 42.7–76)
PLATELET # BLD AUTO: 481 10*3/MM3 (ref 140–450)
PMV BLD AUTO: 11.3 FL (ref 6–12)
POTASSIUM SERPL-SCNC: 3.5 MMOL/L (ref 3.5–5.2)
RBC # BLD AUTO: 4.48 10*6/MM3 (ref 3.77–5.28)
SODIUM SERPL-SCNC: 138 MMOL/L (ref 136–145)
WBC NRBC COR # BLD: 17.05 10*3/MM3 (ref 3.4–10.8)

## 2023-06-07 PROCEDURE — 63710000001 INSULIN LISPRO (HUMAN) PER 5 UNITS

## 2023-06-07 PROCEDURE — 82948 REAGENT STRIP/BLOOD GLUCOSE: CPT

## 2023-06-07 PROCEDURE — 85025 COMPLETE CBC W/AUTO DIFF WBC: CPT | Performed by: NURSE PRACTITIONER

## 2023-06-07 PROCEDURE — 80048 BASIC METABOLIC PNL TOTAL CA: CPT

## 2023-06-07 PROCEDURE — 85652 RBC SED RATE AUTOMATED: CPT | Performed by: FAMILY MEDICINE

## 2023-06-07 PROCEDURE — 86140 C-REACTIVE PROTEIN: CPT | Performed by: FAMILY MEDICINE

## 2023-06-07 RX ORDER — FUROSEMIDE 40 MG/1
40 TABLET ORAL DAILY
Status: DISCONTINUED | OUTPATIENT
Start: 2023-06-07 | End: 2023-06-08 | Stop reason: HOSPADM

## 2023-06-07 RX ORDER — METOPROLOL SUCCINATE 50 MG/1
50 TABLET, EXTENDED RELEASE ORAL NIGHTLY
Status: DISCONTINUED | OUTPATIENT
Start: 2023-06-07 | End: 2023-06-08 | Stop reason: HOSPADM

## 2023-06-07 RX ADMIN — EMPAGLIFLOZIN 10 MG: 10 TABLET, FILM COATED ORAL at 08:26

## 2023-06-07 RX ADMIN — DOCUSATE SODIUM 50 MG AND SENNOSIDES 8.6 MG 2 TABLET: 8.6; 5 TABLET, FILM COATED ORAL at 08:25

## 2023-06-07 RX ADMIN — METOPROLOL SUCCINATE 50 MG: 50 TABLET, EXTENDED RELEASE ORAL at 20:59

## 2023-06-07 RX ADMIN — DULOXETINE HYDROCHLORIDE 30 MG: 30 CAPSULE, DELAYED RELEASE ORAL at 20:59

## 2023-06-07 RX ADMIN — PANTOPRAZOLE SODIUM 40 MG: 40 TABLET, DELAYED RELEASE ORAL at 05:44

## 2023-06-07 RX ADMIN — RIVAROXABAN 20 MG: 20 TABLET, FILM COATED ORAL at 21:02

## 2023-06-07 RX ADMIN — GLIPIZIDE 10 MG: 10 TABLET ORAL at 16:52

## 2023-06-07 RX ADMIN — METOPROLOL SUCCINATE 100 MG: 100 TABLET, FILM COATED, EXTENDED RELEASE ORAL at 08:25

## 2023-06-07 RX ADMIN — TRAMADOL HYDROCHLORIDE 50 MG: 50 TABLET, COATED ORAL at 08:25

## 2023-06-07 RX ADMIN — Medication 10 ML: at 08:26

## 2023-06-07 RX ADMIN — INSULIN LISPRO 2 UNITS: 100 INJECTION, SOLUTION INTRAVENOUS; SUBCUTANEOUS at 08:26

## 2023-06-07 RX ADMIN — MONTELUKAST SODIUM 10 MG: 10 TABLET, FILM COATED ORAL at 20:59

## 2023-06-07 RX ADMIN — INSULIN LISPRO 2 UNITS: 100 INJECTION, SOLUTION INTRAVENOUS; SUBCUTANEOUS at 16:52

## 2023-06-07 RX ADMIN — FUROSEMIDE 40 MG: 40 TABLET ORAL at 11:58

## 2023-06-07 RX ADMIN — ATORVASTATIN CALCIUM 20 MG: 10 TABLET, FILM COATED ORAL at 21:02

## 2023-06-07 RX ADMIN — TRAMADOL HYDROCHLORIDE 50 MG: 50 TABLET, COATED ORAL at 20:59

## 2023-06-07 RX ADMIN — ZOLPIDEM TARTRATE 10 MG: 5 TABLET ORAL at 20:59

## 2023-06-07 RX ADMIN — Medication 10 ML: at 21:02

## 2023-06-07 RX ADMIN — INSULIN LISPRO 2 UNITS: 100 INJECTION, SOLUTION INTRAVENOUS; SUBCUTANEOUS at 11:58

## 2023-06-07 RX ADMIN — LOSARTAN POTASSIUM 25 MG: 50 TABLET, FILM COATED ORAL at 08:25

## 2023-06-07 RX ADMIN — TIZANIDINE 4 MG: 4 TABLET ORAL at 20:59

## 2023-06-07 RX ADMIN — GLIPIZIDE 10 MG: 10 TABLET ORAL at 08:26

## 2023-06-07 RX ADMIN — INSULIN LISPRO 6 UNITS: 100 INJECTION, SOLUTION INTRAVENOUS; SUBCUTANEOUS at 20:59

## 2023-06-07 NOTE — CONSULTS
[facility] Medicine Consult  Consults    Date of Admission: 6/5/2023  Date of Consult: 06/07/23    Primary Care Physician: Virginia Rey APRN  Referring Physician: Dr. Hernandez  Chief Complaint/Reason for Consultation: Leukocytosis    Subjective   History of Present Illness    This 68-year-old female was a direct admission to the cardiology service secondary to abnormal echocardiogram findings.  She recently had a transthoracic echo which revealed thickening of the left coronary cusp of the aortic valve with concern for vegetation.  There was concern by the patient for potential decrease in blood pressure with fever.  The patient was subsequently admitted for work-up of possible endocarditis.  She has a relatively recent history of candidal esophagitis and was placed on antifungal medications.  She expresses concern over worsening fatigue and shortness of breath however oxygen saturations have remained completely normal since admission and the patient has been consistently afebrile.  Vital signs have remained stable and she has not been tachycardiac at any time.  Sepsis screen is negative.  Initial CBC at the time of admission showed a white blood cell count of 13,900 with a hemoglobin of 8.2.  Repeat CBC on 6/6 showed a white blood cell count of 14,700 with platelet count of 483,000.  Repeat CBC this a.m. reveals white blood cell count of 17,000 with platelet count of 481,000.  Differential is essentially benign with a mild absolute monocytosis.  Both of these acute phase reactants could be elevated secondary to stress response.  Blood culture was obtained yesterday and is negative at 24 hours.  Sedimentation rate was only mildly elevated at 35 with a CRP which is within normal limits.  Respiratory PCR panel was negative.  Procalcitonin was completely normal.  This speaks against an infectious process.  During the course of EVAN there is no mention of esophageal  abnormalities so recurrence of candidal esophagitis is unlikely.  Again, I am suspicious that these acute phase reactants are elevated secondary to stress response and white count likely increased post endoscopic evaluation.  Recommendation is to repeat CBC in a.m. and to continue to follow temperature curve.  If white blood cell count stabilizes or decreases then discharge with outpatient follow-up would be appropriate.    Review of Systems   Constitutional:  Positive for fatigue. Negative for fever.   HENT: Negative.     Eyes: Negative.    Respiratory: Negative.     Cardiovascular: Negative.    Gastrointestinal: Negative.    Endocrine: Negative.    Genitourinary: Negative.    Musculoskeletal: Negative.    Skin: Negative.    Allergic/Immunologic: Negative.    Neurological: Negative.    Hematological: Negative.    Psychiatric/Behavioral: Negative.      Otherwise complete ROS is negative except as mentioned above.    Past Medical History:   Past Medical History:   Diagnosis Date    Bradycardia     Cardiomyopathy     CHF (congestive heart failure)     Chronic kidney disease     Coronary arteriosclerosis     Diabetes mellitus     Diverticulitis     GERD (gastroesophageal reflux disease)     HLD (hyperlipidemia)     HTN (hypertension)     Hx of pulmonary embolus     Hypercholesterolemia     LBBB (left bundle branch block)     Obesity     Pulmonary embolism     Rectal bleeding     RLS (restless legs syndrome)     Vitamin D deficiency      Past Surgical History:  Past Surgical History:   Procedure Laterality Date    APPENDECTOMY  1970    CARDIAC CATHETERIZATION  2021    [facility] [place] Dr. Jasso - mild, diffuse, non-obstructive CAD    CHOLECYSTECTOMY  1991    HYSTERECTOMY  1989    LEG SURGERY Left 2015    CALCIFIED TUMOR    TUBAL ABDOMINAL LIGATION  1982    TUMOR REMOVAL       Social History:  reports that she quit smoking about 27 years ago. Her smoking use included cigarettes. She started smoking about 48 years ago. She has a  5.00 pack-year smoking history. She has never used smokeless tobacco. She reports that she does not currently use alcohol. She reports that she does not use drugs.    Family History: family history includes Diabetes type II in her brother, father, mother, and paternal grandmother; Heart disease in her brother, father, maternal grandfather, maternal grandmother, mother, and paternal grandfather; Hypertension in her brother, father, mother, and sister; Stroke in her sister; Ulcerative colitis in her sister.     Allergies:   Allergies   Allergen Reactions    Cephalexin Shortness Of Breath    Cheese Unknown - High Severity     BLUE CHEESE: tongue swells    Codeine Shortness Of Breath    Levaquin [Levofloxacin] Anaphylaxis    Morphine Shortness Of Breath    Naproxen Shortness Of Breath    Penicillins Shortness Of Breath    Aspirin Other (See Comments)    Cetirizine Dizziness    Ciprofloxacin Other (See Comments)     Was taking zanaflex at the time and there is a drug interaction    Gabapentin Swelling    Keppra [Levetiracetam] Other (See Comments)     VISION LOSS    Lisinopril Other (See Comments)     Dry cough    Meloxicam Nausea Only    Vancomycin Other (See Comments)     Vision loss    Sulfa Antibiotics Rash     Medications: Scheduled Meds:atorvastatin, 20 mg, Oral, Nightly  DULoxetine, 30 mg, Oral, Nightly  empagliflozin, 10 mg, Oral, Daily  furosemide, 40 mg, Oral, Daily  glipizide, 10 mg, Oral, BID AC  insulin lispro, 2-9 Units, Subcutaneous, 4x Daily AC & at Bedtime  losartan, 25 mg, Oral, Q24H  metoprolol succinate XL, 100 mg, Oral, Daily  metoprolol succinate XL, 50 mg, Oral, Nightly  montelukast, 10 mg, Oral, Nightly  pantoprazole, 40 mg, Oral, Q AM  rivaroxaban, 20 mg, Oral, Nightly  senna-docusate sodium, 2 tablet, Oral, BID  sodium chloride, 10 mL, Intravenous, Q12H      Continuous Infusions:   PRN Meds:.  senna-docusate sodium **AND** polyethylene glycol **AND** bisacodyl **AND** bisacodyl    cloNIDine     dextrose    dextrose    glucagon (human recombinant)    nitroglycerin    sodium chloride    sodium chloride    tiZANidine    traMADol    zolpidem    I have utilized all available immediate resources to obtain, update, or review the patient's current medications (including all prescriptions, over-the-counter products, herbals, cannabis/cannabidiol products, and vitamin/mineral/dietary (nutritional) supplements).     Objective   Objective    Physical Exam:   Temp:  [97.7 °F (36.5 °C)-98.2 °F (36.8 °C)] 98 °F (36.7 °C)  Heart Rate:  [71-87] 73  Resp:  [16-20] 16  BP: (123-157)/(51-98) 145/75  Physical Exam  Constitutional:       General: She is not in acute distress.     Appearance: Normal appearance. She is obese. She is not toxic-appearing.   HENT:      Head: Normocephalic and atraumatic.      Right Ear: External ear normal.      Left Ear: External ear normal.      Nose: Nose normal.      Mouth/Throat:      Mouth: Mucous membranes are moist.      Pharynx: Oropharynx is clear.   Eyes:      General: No scleral icterus.     Extraocular Movements: Extraocular movements intact.      Conjunctiva/sclera: Conjunctivae normal.      Pupils: Pupils are equal, round, and reactive to light.   Cardiovascular:      Rate and Rhythm: Normal rate and regular rhythm.      Pulses: Normal pulses.      Heart sounds: Normal heart sounds. No murmur heard.  Pulmonary:      Effort: Pulmonary effort is normal. No respiratory distress.      Breath sounds: Normal breath sounds.   Abdominal:      General: Abdomen is protuberant. Bowel sounds are normal.      Palpations: Abdomen is soft. There is no mass.      Tenderness: There is no abdominal tenderness.   Musculoskeletal:         General: Normal range of motion.      Right lower leg: No edema.      Left lower leg: No edema.   Skin:     General: Skin is warm and dry.      Coloration: Skin is not pale.   Neurological:      General: No focal deficit present.      Mental Status: She is alert and  oriented to person, place, and time. Mental status is at baseline.      Cranial Nerves: No cranial nerve deficit.   Psychiatric:         Mood and Affect: Mood normal.         Judgment: Judgment normal.         Results Reviewed:  I have personally reviewed current lab, radiology, and data and agree with results.  Lab Results (last 24 hours)       Procedure Component Value Units Date/Time    POC Glucose Once [[id number]]  (Abnormal) Collected: 06/07/23 1646    Specimen: Blood Updated: 06/07/23 1657     Glucose 190 mg/dL      Comment: : [id number] Mcdonough AdrianaMeter ID: [id number]       POC Glucose Once [[id number]]  (Abnormal) Collected: 06/07/23 1145    Specimen: Blood Updated: 06/07/23 1155     Glucose 188 mg/dL      Comment: : [id number] Mcdonough AdrianaMeter ID: [id number]       C-reactive Protein [[id number]]  (Normal) Collected: 06/07/23 0519    Specimen: Blood Updated: 06/07/23 1120     C-Reactive Protein 0.41 mg/dL     Sedimentation Rate [[id number]]  (Abnormal) Collected: 06/07/23 0802    Specimen: Blood Updated: 06/07/23 1112     Sed Rate 35 mm/hr     CBC & Differential [[id number]]  (Abnormal) Collected: 06/07/23 0802    Specimen: Blood Updated: 06/07/23 0836    Narrative:      The following orders were created for panel order CBC & Differential.  Procedure                               Abnormality         Status                     ---------                               -----------         ------                     CBC Auto Differential[[id number]]        Abnormal            Final result                 Please view results for these tests on the individual orders.    CBC Auto Differential [[id number]]  (Abnormal) Collected: 06/07/23 0802    Specimen: Blood Updated: 06/07/23 0836     WBC 17.05 10*3/mm3      RBC 4.48 10*6/mm3      Hemoglobin 8.7 g/dL      Hematocrit 32.5 %      MCV 72.5 fL      MCH 19.4 pg      MCHC 26.8 g/dL      RDW 16.0 %      RDW-SD 41.1 fl      MPV 11.3 fL      Platelets 481 10*3/mm3       Neutrophil % 73.7 %      Lymphocyte % 16.9 %      Monocyte % 6.5 %      Eosinophil % 1.6 %      Basophil % 0.8 %      Neutrophils, Absolute 12.59 10*3/mm3      Lymphocytes, Absolute 2.88 10*3/mm3      Monocytes, Absolute 1.10 10*3/mm3      Eosinophils, Absolute 0.27 10*3/mm3      Basophils, Absolute 0.13 10*3/mm3     POC Glucose Once [[id number]]  (Abnormal) Collected: 06/07/23 0737    Specimen: Blood Updated: 06/07/23 0747     Glucose 180 mg/dL      Comment: : [id number] Mcdonough AdrianaMeter ID: [id number]       Basic Metabolic Panel [[id number]]  (Abnormal) Collected: 06/07/23 0519    Specimen: Blood Updated: 06/07/23 0606     Glucose 187 mg/dL      BUN 16 mg/dL      Creatinine 0.68 mg/dL      Sodium 138 mmol/L      Potassium 3.5 mmol/L      Chloride 99 mmol/L      CO2 27.0 mmol/L      Calcium 9.5 mg/dL      BUN/Creatinine Ratio 23.5     Anion Gap 12.0 mmol/L      eGFR 96.2 mL/min/1.73     Narrative:      GFR Normal >60  Chronic Kidney Disease <60  Kidney Failure <15      POC Glucose Once [[id number]]  (Abnormal) Collected: 06/06/23 2000    Specimen: Blood Updated: 06/06/23 2010     Glucose 231 mg/dL      Comment: : [id number] Jeannette [facility] Services[facility] ID: [id number]       Blood Culture - Blood, Arm, Left [[id number]]  (Normal) Collected: 06/05/23 1920    Specimen: Blood from Arm, Left Updated: 06/06/23 1945     Blood Culture No growth at 24 hours          Imaging Results (Last 24 Hours)       ** No results found for the last 24 hours. **            Assessment / Plan   Assessment:   Active Hospital Problems    Diagnosis     Tachycardia     Elevated white blood cell count     Fever     Type 2 diabetes mellitus with hyperglycemia, without long-term current use of insulin     Chronic systolic heart failure     Essential hypertension         Treatment Plan  Recheck CBC with differential in a.m.   Continue to observe temperature curve  No antimicrobial treatment recommended  If white blood cell count trend  flattens/decreases then I would not hesitate to allow discharge and have her follow-up with primary care physician for repeat white count as an outpatient.    Medical Decision Making  Number and Complexity of problems:   1) leukocytosis, acute, moderate complexity  2) T2DM, chronic, moderate complexity  3) chronic systolic heart failure, chronic, moderate complexity  4) hypertension, chronic, moderate complexity    Differential Diagnosis: Infectious disease, rheumatologic disorder    Conditions and Status        Condition is improving.     [facility] Data  External documents reviewed: Care Everywhere documents  Cardiac tracing (EKG, telemetry) interpretation: See HPI  Radiology interpretation: See HPI  Labs reviewed: See HPI  Any tests that were considered but not ordered: None     Decision rules/scores evaluated (example [id number], Wells, etc): None     Discussed with: The patient and Jeison Pooleman     Care Planning  Shared decision making: The patient  Code status and discussions: Full code    Disposition  Social Determinants of Health that impact treatment or disposition: None noted  I expect the patient to be discharged by the primary service.     I confirmed that the patient's Advance Care Plan is present, code status is documented, or surrogate decision maker is listed in the patient's medical record.     The patient's surrogate decision maker is her .     Patient seen and examined by me on 6/7/2023 at 1800.    Electronically signed by Salo Acevedo DO, 06/07/23, 18:25 CDT.

## 2023-06-07 NOTE — CASE MANAGEMENT/SOCIAL WORK
Discharge Planning Assessment  [facility]     Patient Name: Damaris Garcias  MRN: [id number]  Today's Date: 6/7/2023    Admit Date: 6/5/2023    Plan: Spoke with patient at bedside for dc planning. Patient independent and denies dc needs. Has pcp and Rx plan. Will continue to follow for discharge.   Discharge Needs Assessment       Row Name 06/07/23 4701       Living Environment    People in Home child(peggy), adult    Current Living Arrangements home    Primary Care Provided by [facility]    Quality of Family Relationships supportive    Able to Return to Prior Arrangements yes       Discharge Needs Assessment    Equipment Currently Used at Home walker, rolling    Concerns to be Addressed denies needs/concerns at this time    Equipment Needed After Discharge none                   Discharge Plan       Row Name 06/07/23 0654       Plan    Plan Spoke with patient at bedside for dc planning. Patient independent and denies dc needs. Has pcp and Rx plan. Will continue to follow for discharge.                  Continued Care and Services - Admitted Since 6/5/2023    Coordination has not been started for this encounter.       Expected Discharge Date and Time       Expected Discharge Date Expected Discharge Time    Jun 7, 2023            Demographic Summary    No documentation.                  Functional Status    No documentation.                  Psychosocial    No documentation.                  Abuse/Neglect    No documentation.                  Legal    No documentation.                  Substance Abuse    No documentation.                  Patient Forms    No documentation.                     Merlina A Fletcher, RN

## 2023-06-07 NOTE — PLAN OF CARE
Goal Outcome Evaluation:              Outcome Evaluation: Pain meds given as needed , heart rate up 170 when up moving , pt had good output  no falls or injuries  this shift , cont to monitor  .

## 2023-06-07 NOTE — PROGRESS NOTES
[facility] -  Progress Note     LOS: 2 days   Patient Care Team:  Virginia Rey APRN as PCP - General (Family Medicine)  Provider, No Known as PCP - Family Medicine  Nancy Sethi APRN as Nurse Practitioner (Cardiology)    Chief Complaint:Chills    Subjective     Interval History:     Patient Complaints:   Notes overall doesn't feel good. Intermittent chills and swears. Nauseated. Leg swelling much better. Profound urine output. Intermittent heart racing         Review of Systems:     Review of Systems   Constitutional:  Positive for chills and fever. Negative for fatigue and unexpected weight change.   HENT:  Negative for nosebleeds.    Eyes:  Negative for visual disturbance.   Respiratory:  Negative for chest tightness and shortness of breath.    Cardiovascular:  Positive for leg swelling. Negative for chest pain and palpitations.   Gastrointestinal:  Positive for nausea. Negative for abdominal pain, diarrhea and vomiting.   Endocrine: Negative for cold intolerance.   Genitourinary:  Negative for dysuria.   Musculoskeletal:  Negative for back pain.   Skin:  Negative for pallor and wound.   Allergic/Immunologic: Negative for environmental allergies.   Neurological:  Negative for dizziness, light-headedness and headaches.   Hematological:  Does not bruise/bleed easily.   Psychiatric/Behavioral:  Negative for behavioral problems.    Objective     Vital Sign Min/Max for last 24 hours  Temp  Min: 97.7 °F (36.5 °C)  Max: 98.2 °F (36.8 °C)   BP  Min: 123/66  Max: 174/79   Pulse  Min: 63  Max: 87   Resp  Min: 16  Max: 20   SpO2  Min: 94 %  Max: 97 %   No data recorded   No data recorded         06/05/23 2016   Weight: 98.5 kg (217 lb 3.2 oz)         Intake/Output Summary (Last 24 hours) at 6/7/2023 1012  Last data filed at 6/7/2023 0900  Gross per 24 hour   Intake 720 ml   Output 5000 ml   Net -4280 ml        Telemetry: NSR with episodes of atrial flutter      Physical  Exam:    Constitutional:       Appearance: Healthy appearance. Not in distress.   Eyes:      Pupils: Pupils are equal, round, and reactive to light.   HENT:      Head: Normocephalic and atraumatic.      Nose: Nose normal.    Mouth/Throat:      Dentition: Normal.   Pulmonary:      Effort: Pulmonary effort is normal.      Breath sounds: Normal breath sounds.   Chest:      Chest wall: Not tender to palpatation.   Cardiovascular:      PMI at left midclavicular line. Normal rate. Regular rhythm.      Murmurs: There is no murmur.   Pulses:     Intact distal pulses.   Edema:     Peripheral edema (edema much improved) present.     Ankle: bilateral trace pitting edema of the ankle.  Abdominal:      General: Bowel sounds are normal.      Palpations: Abdomen is soft.   Musculoskeletal: Normal range of motion.      Cervical back: Normal range of motion. Skin:     General: Skin is warm and dry.   Neurological:      Mental Status: Alert, oriented to person, place, and time and oriented to person, place and time.   Psychiatric:         Attention and Perception: Attention normal.         Mood and Affect: Mood normal.     Results Review:   Lab Results (last 72 hours)       Procedure Component Value Units Date/Time    CBC & Differential [[id number]]  (Abnormal) Collected: 06/07/23 0802    Specimen: Blood Updated: 06/07/23 0836    Narrative:      The following orders were created for panel order CBC & Differential.  Procedure                               Abnormality         Status                     ---------                               -----------         ------                     CBC Auto Differential[[id number]]        Abnormal            Final result                 Please view results for these tests on the individual orders.    CBC Auto Differential [[id number]]  (Abnormal) Collected: 06/07/23 0802    Specimen: Blood Updated: 06/07/23 0836     WBC 17.05 10*3/mm3      RBC 4.48 10*6/mm3      Hemoglobin 8.7 g/dL      Hematocrit  32.5 %      MCV 72.5 fL      MCH 19.4 pg      MCHC 26.8 g/dL      RDW 16.0 %      RDW-SD 41.1 fl      MPV 11.3 fL      Platelets 481 10*3/mm3      Neutrophil % 73.7 %      Lymphocyte % 16.9 %      Monocyte % 6.5 %      Eosinophil % 1.6 %      Basophil % 0.8 %      Neutrophils, Absolute 12.59 10*3/mm3      Lymphocytes, Absolute 2.88 10*3/mm3      Monocytes, Absolute 1.10 10*3/mm3      Eosinophils, Absolute 0.27 10*3/mm3      Basophils, Absolute 0.13 10*3/mm3     POC Glucose Once [[id number]]  (Abnormal) Collected: 06/07/23 0737    Specimen: Blood Updated: 06/07/23 0747     Glucose 180 mg/dL      Comment: : [id number] Mcdonough AdrianaMeter ID: [id number]       Basic Metabolic Panel [[id number]]  (Abnormal) Collected: 06/07/23 0519    Specimen: Blood Updated: 06/07/23 0606     Glucose 187 mg/dL      BUN 16 mg/dL      Creatinine 0.68 mg/dL      Sodium 138 mmol/L      Potassium 3.5 mmol/L      Chloride 99 mmol/L      CO2 27.0 mmol/L      Calcium 9.5 mg/dL      BUN/Creatinine Ratio 23.5     Anion Gap 12.0 mmol/L      eGFR 96.2 mL/min/1.73     Narrative:      GFR Normal >60  Chronic Kidney Disease <60  Kidney Failure <15      POC Glucose Once [[id number]]  (Abnormal) Collected: 06/06/23 2000    Specimen: Blood Updated: 06/06/23 2010     Glucose 231 mg/dL      Comment: : [id number] Jeannette ArizmendiPutnam County Memorial HospitalekaMeter ID: [id number]       Blood Culture - Blood, Arm, Left [[id number]]  (Normal) Collected: 06/05/23 1920    Specimen: Blood from Arm, Left Updated: 06/06/23 1945     Blood Culture No growth at 24 hours    POC Glucose Once [[id number]]  (Abnormal) Collected: 06/06/23 1620    Specimen: Blood Updated: 06/06/23 1631     Glucose 213 mg/dL      Comment: : [id number] Juan Pablo JonesaMeter ID: [id number]       BNP [[id number]]  (Abnormal) Collected: 06/06/23 0620    Specimen: Blood Updated: 06/06/23 1509     proBNP 1,556.0 pg/mL     Narrative:      Among patients with dyspnea, NT-proBNP is highly sensitive for the detection of acute  congestive heart failure. In addition NT-proBNP of <300 pg/ml effectively rules out acute congestive heart failure with 99% negative predictive value.    Results may be falsely decreased if patient taking Biotin.      Respiratory Panel PCR w/COVID-19(SARS-CoV-2) DIVINA/CRUZ/ZIGGY/PAD/COR/MAD/EMERALD In-House, NP Swab in UTM/VTM, 3-4 HR TAT - Swab, Nasopharynx [[id number]]  (Normal) Collected: 06/06/23 1240    Specimen: Swab from Nasopharynx Updated: 06/06/23 1335     ADENOVIRUS, PCR Not Detected     Coronavirus 229E Not Detected     Coronavirus HKU1 Not Detected     Coronavirus NL63 Not Detected     Coronavirus OC43 Not Detected     COVID19 Not Detected     Human Metapneumovirus Not Detected     Human Rhinovirus/Enterovirus Not Detected     Influenza A PCR Not Detected     Influenza B PCR Not Detected     Parainfluenza Virus 1 Not Detected     Parainfluenza Virus 2 Not Detected     Parainfluenza Virus 3 Not Detected     Parainfluenza Virus 4 Not Detected     RSV, PCR Not Detected     Bordetella pertussis pcr Not Detected     Bordetella parapertussis PCR Not Detected     Chlamydophila pneumoniae PCR Not Detected     Mycoplasma pneumo by PCR Not Detected    Narrative:      In the setting of a positive respiratory panel with a viral infection PLUS a negative procalcitonin without other underlying concern for bacterial infection, consider observing off antibiotics or discontinuation of antibiotics and continue supportive care. If the respiratory panel is positive for atypical bacterial infection (Bordetella pertussis, Chlamydophila pneumoniae, or Mycoplasma pneumoniae), consider antibiotic de-escalation to target atypical bacterial infection.    Urinalysis, Microscopic Only - Urine, Clean Catch [[id number]]  (Abnormal) Collected: 06/06/23 1239    Specimen: Urine, Clean Catch Updated: 06/06/23 1301     RBC, UA None Seen /HPF      WBC, UA 0-2 /HPF      Bacteria, UA None Seen /HPF      Squamous Epithelial Cells, UA None Seen /HPF       "Hyaline Casts, UA None Seen /LPF      Methodology Automated Microscopy    Urinalysis With Microscopic If Indicated (No Culture) - Urine, Clean Catch [[id number]]  (Abnormal) Collected: 06/06/23 1239    Specimen: Urine, Clean Catch Updated: 06/06/23 1301     Color, UA Yellow     Appearance, UA Clear     pH, UA 6.5     Specific Gravity, UA <=1.005     Glucose,  mg/dL (Trace)     Ketones, UA Negative     Bilirubin, UA Negative     Blood, UA Negative     Protein, UA Negative     Leuk Esterase, UA Trace     Nitrite, UA Negative     Urobilinogen, UA 0.2 E.U./dL    CBC (No Diff) [[id number]]  (Abnormal) Collected: 06/06/23 1206    Specimen: Blood Updated: 06/06/23 1255     WBC 14.69 10*3/mm3      RBC 4.18 10*6/mm3      Hemoglobin 8.2 g/dL      Hematocrit 29.9 %      MCV 71.5 fL      MCH 19.6 pg      MCHC 27.4 g/dL      RDW 15.8 %      RDW-SD 40.2 fl      MPV 11.6 fL      Platelets 483 10*3/mm3     POC Glucose Once [[id number]]  (Abnormal) Collected: 06/06/23 1153    Specimen: Blood Updated: 06/06/23 1204     Glucose 221 mg/dL      Comment: : [id number] Hilliard ID: [id number]       Procalcitonin [[id number]]  (Normal) Collected: 06/06/23 0620    Specimen: Blood Updated: 06/06/23 1201     Procalcitonin 0.02 ng/mL     Narrative:      As a Marker for Sepsis (Non-Neonates):    1. <0.5 ng/mL represents a low risk of severe sepsis and/or septic shock.  2. >2 ng/mL represents a high risk of severe sepsis and/or septic shock.    As a Marker for Lower Respiratory Tract Infections that require antibiotic therapy:    PCT on Admission    Antibiotic Therapy       6-12 Hrs later    >0.5                Strongly Recommended  >0.25 - <0.5        Recommended   0.1 - 0.25          Discouraged              Remeasure/reassess PCT  <0.1                Strongly Discouraged     Remeasure/reassess PCT    As 28 day mortality risk marker: \"Change in Procalcitonin Result\" (>80% or <=80%) if Day 0 (or Day 1) and Day 4 values are " available. Refer to http://www.[facility]-pct-calculator.com    Change in PCT <=80%  A decrease of PCT levels below or equal to 80% defines a positive change in PCT test result representing a higher risk for 28-day all-cause mortality of patients diagnosed with severe sepsis for septic shock.    Change in PCT >80%  A decrease of PCT levels of more than 80% defines a negative change in PCT result representing a lower risk for 28-day all-cause mortality of patients diagnosed with severe sepsis or septic shock.       POC Glucose Once [[id number]]  (Abnormal) Collected: 06/06/23 0802    Specimen: Blood Updated: 06/06/23 0814     Glucose 232 mg/dL      Comment: : [id number] Juan Pablo JonesaMeter ID: [id number]       Basic Metabolic Panel [[id number]]  (Abnormal) Collected: 06/06/23 0620    Specimen: Blood Updated: 06/06/23 0656     Glucose 249 mg/dL      BUN 9 mg/dL      Creatinine 0.56 mg/dL      Sodium 137 mmol/L      Potassium 3.5 mmol/L      Chloride 100 mmol/L      CO2 27.0 mmol/L      Calcium 9.0 mg/dL      BUN/Creatinine Ratio 16.1     Anion Gap 10.0 mmol/L      eGFR 100.8 mL/min/1.73     Narrative:      GFR Normal >60  Chronic Kidney Disease <60  Kidney Failure <15      POC Glucose Once [[id number]]  (Abnormal) Collected: 06/05/23 2015    Specimen: Blood Updated: 06/05/23 2026     Glucose 212 mg/dL      Comment: : [id number] Dickeylyndsay [facility]Mer[facility] [place] ID: [id number]       Basic Metabolic Panel [[id number]]  (Abnormal) Collected: 06/05/23 1707    Specimen: Blood Updated: 06/05/23 1824     Glucose 198 mg/dL      BUN 9 mg/dL      Creatinine 0.53 mg/dL      Sodium 137 mmol/L      Potassium 3.8 mmol/L      Chloride 100 mmol/L      CO2 26.0 mmol/L      Calcium 8.7 mg/dL      BUN/Creatinine Ratio 17.0     Anion Gap 11.0 mmol/L      eGFR 102.1 mL/min/1.73     Narrative:      GFR Normal >60  Chronic Kidney Disease <60  Kidney Failure <15      CBC & Differential [[id number]]  (Abnormal) Collected: 06/05/23 1707    Specimen: Blood  Updated: 06/05/23 1757    Narrative:      The following orders were created for panel order CBC & Differential.  Procedure                               Abnormality         Status                     ---------                               -----------         ------                     CBC Auto Differential[[id number]]        Abnormal            Final result                 Please view results for these tests on the individual orders.    CBC Auto Differential [[id number]]  (Abnormal) Collected: 06/05/23 1707    Specimen: Blood Updated: 06/05/23 1757     WBC 13.93 10*3/mm3      RBC 4.18 10*6/mm3      Hemoglobin 8.2 g/dL      Hematocrit 31.1 %      MCV 74.4 fL      MCH 19.6 pg      MCHC 26.4 g/dL      RDW 15.6 %      RDW-SD 41.5 fl      MPV 11.5 fL      Platelets 456 10*3/mm3      Neutrophil % 71.2 %      Lymphocyte % 20.0 %      Monocyte % 5.9 %      Eosinophil % 1.7 %      Basophil % 0.8 %      Neutrophils, Absolute 9.93 10*3/mm3      Lymphocytes, Absolute 2.79 10*3/mm3      Monocytes, Absolute 0.82 10*3/mm3      Eosinophils, Absolute 0.23 10*3/mm3      Basophils, Absolute 0.11 10*3/mm3           Results for orders placed during the hospital encounter of 06/05/23    Adult Transesophageal Echo (EVAN) W/ Cont if Necessary Per Protocol    Interpretation Summary    Left ventricular systolic function is normal. Left ventricular ejection fraction appears to be 61 - 65%.    Left ventricular wall thickness is consistent with hypertrophy.    No evidence of a left atrial appendage thrombus was present.    Saline test results are negative for right to left atrial level shunt.    Here is moderate calcification of the aortic valve mainly affecting the left coronary cusp(s). Trace aortic valve regurgitation is present.    There is no evidence of an aortic valve mass is present. No evidence of an aortic valve abscess is present.    Mild mitral valve regurgitation is present with multiple jets noted.       Medication Review:  yes  Current Facility-Administered Medications   Medication Dose Route Frequency Provider Last Rate Last Admin    atorvastatin (LIPITOR) tablet 20 mg  20 mg Oral Nightly Jake Hernandez DO   20 mg at 06/06/23 2056    sennosides-docusate (PERICOLACE) 8.6-50 MG per tablet 2 tablet  2 tablet Oral BID Steve Andres APRN   2 tablet at 06/07/23 0825    And    polyethylene glycol (MIRALAX) packet 17 g  17 g Oral Daily PRN Steve Andres APRN        And    bisacodyl (DULCOLAX) EC tablet 5 mg  5 mg Oral Daily PRN Steve Andres APRN        And    bisacodyl (DULCOLAX) suppository 10 mg  10 mg Rectal Daily PRN Steve Andres APRN        cloNIDine (CATAPRES) tablet 0.1 mg  0.1 mg Oral TID PRN Steve Andres APRN   0.1 mg at 06/06/23 1531    dextrose (D50W) (25 g/50 mL) IV injection 25 g  25 g Intravenous Q15 Min PRN tSeve Andres APRN        dextrose (GLUTOSE) oral gel 15 g  15 g Oral Q15 Min PRN Steve Andres APRN        DULoxetine (CYMBALTA) DR capsule 30 mg  30 mg Oral Nightly Jake Hernandez DO   30 mg at 06/06/23 2057    empagliflozin (JARDIANCE) tablet 10 mg  10 mg Oral Daily Jeison Forde APRN   10 mg at 06/07/23 0826    glipizide (GLUCOTROL) tablet 10 mg  10 mg Oral BID AC Steve Andres APRN   10 mg at 06/07/23 0826    glucagon (GLUCAGEN) injection 1 mg  1 mg Intramuscular Q15 Min PRN Steve Andres APRN        Insulin Lispro (humaLOG) injection 2-9 Units  2-9 Units Subcutaneous 4x Daily AC & at Bedtime Steve Andres APRN   2 Units at 06/07/23 0826    losartan (COZAAR) tablet 25 mg  25 mg Oral Q24H Jeison Forde APRN   25 mg at 06/07/23 0825    metoprolol succinate XL (TOPROL-XL) 24 hr tablet 100 mg  100 mg Oral Daily Jeison Forde APRN   100 mg at 06/07/23 0825    montelukast (SINGULAIR) tablet 10 mg  10 mg Oral Nightly Jake Hernandez DO   10 mg at 06/06/23 2056    nitroglycerin (NITROSTAT) SL tablet 0.4 mg  0.4 mg Sublingual Q5 Min PRN  "Steve Andres APRN        pantoprazole (PROTONIX) EC tablet 40 mg  40 mg Oral Q AM Steve Andres APRN   40 mg at 06/07/23 0544    rivaroxaban (XARELTO) tablet 20 mg  20 mg Oral Nightly Steve Andres APRN   20 mg at 06/06/23 2056    sodium chloride 0.9 % flush 10 mL  10 mL Intravenous Q12H Steve Andres APRN   10 mL at 06/07/23 0826    sodium chloride 0.9 % flush 10 mL  10 mL Intravenous PRN Steve Andres APRN        sodium chloride 0.9 % infusion 40 mL  40 mL Intravenous PRN Steve Andres APRN        tiZANidine (ZANAFLEX) tablet 4 mg  4 mg Oral Q12H PRN Steve Andres APRN   4 mg at 06/06/23 1515    traMADol (ULTRAM) tablet 50 mg  50 mg Oral Q6H PRN Jake Hernandez, DO   50 mg at 06/07/23 0825    zolpidem (AMBIEN) tablet 10 mg  10 mg Oral Nightly PRN Jake Hernandez, DO   10 mg at 06/06/23 2055         Assessment & Plan       Essential hypertension    Chronic systolic heart failure    Type 2 diabetes mellitus with hyperglycemia, without long-term current use of insulin    Tachycardia    Elevated white blood cell count    Fever    Plan:  Fever- continued chills and \"feeling hot\" overnight. No documented fever. White count continues to elevate. Will consult hospitilast for assistance.     Anemia - unclear cause. Will consult hospitalist     Abnormal platelet- will consult hospitalist team    Leukocytosis - WBC continues to increase    Atrial Flutter- continued intermittent episodes despite increase in Toprol. Consider antiarrythmic? Anticoagulated with Xarelto     Pulmonary Embolism- remote. Chronic anticoagulation.     Systolic Heart Failure- . With a now recovered LVEF. appeared volume overloaded yesterday. Given 1 dose of IV Lasix with good urine response. Start Po Lasix today. On Losartan. Jardiance and Toprol.     Patient stable from a cardiac standpoint. But CBC continues to be abnormal with chills and sweats per patient.     Electronically signed by Jeison Forde, " GUILHERME, 06/07/23, 10:05 AM CDT.

## 2023-06-08 ENCOUNTER — READMISSION MANAGEMENT (OUTPATIENT)
Dept: CALL CENTER | Facility: HOSPITAL | Age: 68
End: 2023-06-08
Payer: COMMERCIAL

## 2023-06-08 VITALS
WEIGHT: 216.4 LBS | DIASTOLIC BLOOD PRESSURE: 90 MMHG | BODY MASS INDEX: 38.34 KG/M2 | HEIGHT: 63 IN | OXYGEN SATURATION: 95 % | TEMPERATURE: 98.8 F | HEART RATE: 87 BPM | RESPIRATION RATE: 18 BRPM | SYSTOLIC BLOOD PRESSURE: 153 MMHG

## 2023-06-08 LAB
BASOPHILS # BLD AUTO: 0.11 10*3/MM3 (ref 0–0.2)
BASOPHILS NFR BLD AUTO: 0.7 % (ref 0–1.5)
DEPRECATED RDW RBC AUTO: 40.9 FL (ref 37–54)
EOSINOPHIL # BLD AUTO: 0.3 10*3/MM3 (ref 0–0.4)
EOSINOPHIL NFR BLD AUTO: 1.9 % (ref 0.3–6.2)
ERYTHROCYTE [DISTWIDTH] IN BLOOD BY AUTOMATED COUNT: 15.9 % (ref 12.3–15.4)
GLUCOSE BLDC GLUCOMTR-MCNC: 164 MG/DL (ref 70–130)
GLUCOSE BLDC GLUCOMTR-MCNC: 188 MG/DL (ref 70–130)
HCT VFR BLD AUTO: 29.7 % (ref 34–46.6)
HGB BLD-MCNC: 8.1 G/DL (ref 12–15.9)
LYMPHOCYTES # BLD AUTO: 3.27 10*3/MM3 (ref 0.7–3.1)
LYMPHOCYTES NFR BLD AUTO: 20.8 % (ref 19.6–45.3)
MCH RBC QN AUTO: 19.7 PG (ref 26.6–33)
MCHC RBC AUTO-ENTMCNC: 27.3 G/DL (ref 31.5–35.7)
MCV RBC AUTO: 72.1 FL (ref 79–97)
MONOCYTES # BLD AUTO: 1.34 10*3/MM3 (ref 0.1–0.9)
MONOCYTES NFR BLD AUTO: 8.5 % (ref 5–12)
NEUTROPHILS NFR BLD AUTO: 10.62 10*3/MM3 (ref 1.7–7)
NEUTROPHILS NFR BLD AUTO: 67.6 % (ref 42.7–76)
PLATELET # BLD AUTO: 444 10*3/MM3 (ref 140–450)
PMV BLD AUTO: 11.7 FL (ref 6–12)
RBC # BLD AUTO: 4.12 10*6/MM3 (ref 3.77–5.28)
WBC NRBC COR # BLD: 15.72 10*3/MM3 (ref 3.4–10.8)

## 2023-06-08 PROCEDURE — 63710000001 INSULIN LISPRO (HUMAN) PER 5 UNITS

## 2023-06-08 PROCEDURE — 82948 REAGENT STRIP/BLOOD GLUCOSE: CPT

## 2023-06-08 PROCEDURE — 85025 COMPLETE CBC W/AUTO DIFF WBC: CPT | Performed by: FAMILY MEDICINE

## 2023-06-08 RX ORDER — CLONIDINE HYDROCHLORIDE 0.1 MG/1
0.1 TABLET ORAL 3 TIMES DAILY PRN
Start: 2023-06-08

## 2023-06-08 RX ORDER — FUROSEMIDE 40 MG/1
40 TABLET ORAL DAILY PRN
Qty: 30 TABLET | Refills: 2 | Status: SHIPPED | OUTPATIENT
Start: 2023-06-08 | End: 2024-06-07

## 2023-06-08 RX ORDER — LOSARTAN POTASSIUM 25 MG/1
25 TABLET ORAL
Qty: 30 TABLET | Refills: 3 | Status: SHIPPED | OUTPATIENT
Start: 2023-06-09

## 2023-06-08 RX ADMIN — FUROSEMIDE 40 MG: 40 TABLET ORAL at 09:23

## 2023-06-08 RX ADMIN — EMPAGLIFLOZIN 10 MG: 10 TABLET, FILM COATED ORAL at 09:23

## 2023-06-08 RX ADMIN — TRAMADOL HYDROCHLORIDE 50 MG: 50 TABLET, COATED ORAL at 09:23

## 2023-06-08 RX ADMIN — PANTOPRAZOLE SODIUM 40 MG: 40 TABLET, DELAYED RELEASE ORAL at 05:23

## 2023-06-08 RX ADMIN — LOSARTAN POTASSIUM 25 MG: 50 TABLET, FILM COATED ORAL at 09:23

## 2023-06-08 RX ADMIN — INSULIN LISPRO 2 UNITS: 100 INJECTION, SOLUTION INTRAVENOUS; SUBCUTANEOUS at 09:23

## 2023-06-08 RX ADMIN — METOPROLOL SUCCINATE 100 MG: 100 TABLET, FILM COATED, EXTENDED RELEASE ORAL at 09:23

## 2023-06-08 RX ADMIN — DOCUSATE SODIUM 50 MG AND SENNOSIDES 8.6 MG 2 TABLET: 8.6; 5 TABLET, FILM COATED ORAL at 09:23

## 2023-06-08 RX ADMIN — Medication 10 ML: at 09:24

## 2023-06-08 RX ADMIN — GLIPIZIDE 10 MG: 10 TABLET ORAL at 09:23

## 2023-06-08 NOTE — PLAN OF CARE
Goal Outcome Evaluation:              Outcome Evaluation: PAIN MEDS GIVEN X1  UP AD MARLO  NO FALLS OR INJURIES ,  CONT TO MONITOR , EDEMA TO LOWER EXT.

## 2023-06-08 NOTE — OUTREACH NOTE
Prep Survey      Flowsheet Row Responses   [facility] patient discharged from? [place]   Is LACE score < 7 ? No   Eligibility [facility]   Date of Admission 06/05/23   Date of Discharge 06/08/23   Discharge Disposition Home or Self Care   Discharge diagnosis vegetation of heart valve, CHF   Does the patient have one of the following disease processes/diagnoses(primary or secondary)? CHF   Does the patient have Home health ordered? No   Is there a DME ordered? No   Prep survey completed? Yes            Angela DIXON - Registered Nurse

## 2023-06-08 NOTE — PAYOR COMM NOTE
"    6/8/23. [facility] [phone]    [phone]    FAXING UPDATE CLINICAL FOR MEDICAL REVIEW.        Damaris Garcias (68 y.o. Female)       Date of Birth   1955    Social Security Number       Address   [address] [place] [address]    Home Phone   [phone]    MRN   [id number]       Taoist   [facility]    Marital Status                               Admission Date   6/5/23    Admission Type   Urgent    Admitting Provider   Jake Hernandez DO    Attending Provider   Jake Hernandez DO    Department, Room/Bed   [facility] 4B, 451/1       Discharge Date       Discharge Disposition   Home or Self Care    Discharge Destination                                 Attending Provider: Jake Hernandez DO    Allergies: Cephalexin, Cheese, Codeine, Levaquin [Levofloxacin], Morphine, Naproxen, Penicillins, Aspirin, Cetirizine, Ciprofloxacin, Entresto [Sacubitril-valsartan], Gabapentin, Keppra [Levetiracetam], Lisinopril, Meloxicam, Vancomycin, Sulfa Antibiotics    Isolation: None   Infection: COVID (rule out) (06/06/23)   Code Status: CPR    Ht: 160 cm (63\")   Wt: 98.2 kg (216 lb 6.4 oz)    Admission Cmt: None   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]                   Active Insurance as of 6/5/2023       Primary Coverage       Payor Plan Insurance Group Employer/Plan Group    [facility] [place] [facility] [place]        Payor Plan Address Payor Plan Phone Number Payor Plan Fax Number Effective Dates    [address]   7/1/2018 - None Entered    [place] [phone]         Subscriber Name Subscriber Birth Date Member ID       DAMARIS GARCIAS 1955 [id number]               Secondary Coverage       Payor Plan Insurance Group Employer/Plan Group    [facility] MEDICARE REPLACEMENT [facility] DUAL COMPLETE [place]IC[id number]       Payor Plan Address Payor Plan Phone Number Payor Plan " Fax Number Effective Dates    [address] [phone]  2023 - None Entered    [facility] [address]         Subscriber Name Subscriber Birth Date Member ID       LIZ MANZANARES 1955 [id number]                     Emergency Contacts        (Rel.) Home Phone Work Phone Mobile Phone    bertram bull (Son) [phone] -- --    MORALES VALDEZ (Daughter) [phone] -- --    Joanne Edmonds -- -- [phone]    brian bull (Son) -- -- [phone]    doron easton (Other) -- -- [phone]             [facility] Encounter Date/Time: 2023 [facility]   Hospital Account: [id number]    MRN: [id number]   Patient:  Liz Manzanares   Contact Serial #: [id number]   SSN:          ENCOUNTER             Patient Class: Inpatient   Unit: [address] Service: Cardiology     Bed: 451/1   Admitting Provider: Jake Hernandez*   Referring Physician: Jake Hernandez*   Attending Provider: Jake Hernandez*   Adm Diagnosis: Vegetation of heart valv*               PATIENT          Name: Liz Manzanares : 1955 (68 yrs)   Address: [address] [place] [address] Sex: Female   City: [place] [address]   County: [facility]   Marital Status:  Ethnicity: NOT                                                                              Race: WHITE   Primary Care Provider: Virginia Rey A* Patients Phone: Home Phone: [phone]     Mobile Phone: [phone]   EMERGENCY CONTACT   Contact Name Legal Guardian? Relationship to Patient Home Phone Work Phone   1. bullbertram  2. MORALES AVLDEZ      Son  Daughter [phone]           GUARANTOR            Guarantor: Liz Manzanares     : 1955   Address: [address] [place] [address] Sex: [place] [address]     Relation to Patient: Self       Home Phone: [phone]   Guarantor ID: [id number]       Work Phone:     GUARANTOR EMPLOYER   Employer:           Status: RETIRED   COVERAGE          PRIMARY INSURANCE    Payor[place] [employer] [facility] [facility] [place]: [id number]   Group Number:   Insurance Type: INDEMNITY   Subscriber Name: LIZ MANZANARES Subscriber : 1955   Subscriber ID: [id number] Coverage Address: [address]   [place] [address]   Pat. Rel. to Subscriber: Self Coverage Phone:     SECONDARY INSURANCE   Payor: [facility] MEDICARE REPLACEMENT Plan: [facility] DUAL COMPLETE MEDICARE REPLACEMENT   Group Number: [id number] Insurance Type: INDEMNITY   Subscriber Name: LIZ MANZANARES Subscriber : 1955   Subscriber ID: [id number] Coverage Address: [address]  [place] [address]   Pat. Rel. to Subscriber: SELF Coverage Phone: [phone]      Contact Serial # ([id number])         2023    Chart ID ([id number])         Salo Acevedo DO   Physician  Specialty: Hospitalist     Consults     Signed     Date of Service: 23  Creation Time: 23     Signed       Expand All [facility] Medicine Consult  Consults     Date of Admission: 2023  Date of Consult: 23     Primary Care Physician: Virginia Rey APRN  Referring Physician: Dr. Hernandez  Chief Complaint/Reason for Consultation: Leukocytosis     Subjective   History of Present Illness     This 68-year-old female was a direct admission to the cardiology service secondary to abnormal echocardiogram findings.  She recently had a transthoracic echo which revealed thickening of the left coronary cusp of the aortic valve with concern for vegetation.  There was concern by the patient for potential decrease in blood pressure with fever.  The patient was subsequently admitted for work-up of possible endocarditis.  She has a relatively recent history of candidal esophagitis and was placed on antifungal medications.  She expresses concern over worsening fatigue and shortness of breath however oxygen saturations have remained  completely normal since admission and the patient has been consistently afebrile.  Vital signs have remained stable and she has not been tachycardiac at any time.  Sepsis screen is negative.  Initial CBC at the time of admission showed a white blood cell count of 13,900 with a hemoglobin of 8.2.  Repeat CBC on 6/6 showed a white blood cell count of 14,700 with platelet count of 483,000.  Repeat CBC this a.m. reveals white blood cell count of 17,000 with platelet count of 481,000.  Differential is essentially benign with a mild absolute monocytosis.  Both of these acute phase reactants could be elevated secondary to stress response.  Blood culture was obtained yesterday and is negative at 24 hours.  Sedimentation rate was only mildly elevated at 35 with a CRP which is within normal limits.  Respiratory PCR panel was negative.  Procalcitonin was completely normal.  This speaks against an infectious process.  During the course of EVAN there is no mention of esophageal abnormalities so recurrence of candidal esophagitis is unlikely.  Again, I am suspicious that these acute phase reactants are elevated secondary to stress response and white count likely increased post endoscopic evaluation.  Recommendation is to repeat CBC in a.m. and to continue to follow temperature curve.  If white blood cell count stabilizes or decreases then discharge with outpatient follow-up would be appropriate.     Review of Systems   Constitutional:  Positive for fatigue. Negative for fever.   HENT: Negative.     Eyes: Negative.    Respiratory: Negative.     Cardiovascular: Negative.    Gastrointestinal: Negative.    Endocrine: Negative.    Genitourinary: Negative.    Musculoskeletal: Negative.    Skin: Negative.    Allergic/Immunologic: Negative.    Neurological: Negative.    Hematological: Negative.    Psychiatric/Behavioral: Negative.      Otherwise complete ROS is negative except as mentioned above.     Past Medical History:   Medical  History[]Expand by Default        Past Medical History:   Diagnosis Date    Bradycardia      Cardiomyopathy      CHF (congestive heart failure)      Chronic kidney disease      Coronary arteriosclerosis      Diabetes mellitus      Diverticulitis      GERD (gastroesophageal reflux disease)      HLD (hyperlipidemia)      HTN (hypertension)      Hx of pulmonary embolus      Hypercholesterolemia      LBBB (left bundle branch block)      Obesity      Pulmonary embolism      Rectal bleeding      RLS (restless legs syndrome)      Vitamin D deficiency           Past Surgical History:                       Salo Acevedo DO   Physician  Specialty: Hospitalist     Consults     Signed     Date of Service: 06/07/23 1825  Creation Time: 06/07/23 1825     Signed       Expand All [facility] Medicine Consult  Consults     Date of Admission: 6/5/2023  Date of Consult: 06/07/23     Primary Care Physician: Virginia Rey APRN  Referring Physician: Dr. Hernandez  Chief Complaint/Reason for Consultation: Leukocytosis     Subjective   History of Present Illness     This 68-year-old female was a direct admission to the cardiology service secondary to abnormal echocardiogram findings.  She recently had a transthoracic echo which revealed thickening of the left coronary cusp of the aortic valve with concern for vegetation.  There was concern by the patient for potential decrease in blood pressure with fever.  The patient was subsequently admitted for work-up of possible endocarditis.  She has a relatively recent history of candidal esophagitis and was placed on antifungal medications.  She expresses concern over worsening fatigue and shortness of breath however oxygen saturations have remained completely normal since admission and the patient has been consistently afebrile.  Vital signs have remained stable and she has not been tachycardiac at any time.  Sepsis screen is  negative.  Initial CBC at the time of admission showed a white blood cell count of 13,900 with a hemoglobin of 8.2.  Repeat CBC on 6/6 showed a white blood cell count of 14,700 with platelet count of 483,000.  Repeat CBC this a.m. reveals white blood cell count of 17,000 with platelet count of 481,000.  Differential is essentially benign with a mild absolute monocytosis.  Both of these acute phase reactants could be elevated secondary to stress response.  Blood culture was obtained yesterday and is negative at 24 hours.  Sedimentation rate was only mildly elevated at 35 with a CRP which is within normal limits.  Respiratory PCR panel was negative.  Procalcitonin was completely normal.  This speaks against an infectious process.  During the course of EVAN there is no mention of esophageal abnormalities so recurrence of candidal esophagitis is unlikely.  Again, I am suspicious that these acute phase reactants are elevated secondary to stress response and white count likely increased post endoscopic evaluation.  Recommendation is to repeat CBC in a.m. and to continue to follow temperature curve.  If white blood cell count stabilizes or decreases then discharge with outpatient follow-up would be appropriate.     Review of Systems   Constitutional:  Positive for fatigue. Negative for fever.   HENT: Negative.     Eyes: Negative.    Respiratory: Negative.     Cardiovascular: Negative.    Gastrointestinal: Negative.    Endocrine: Negative.    Genitourinary: Negative.    Musculoskeletal: Negative.    Skin: Negative.    Allergic/Immunologic: Negative.    Neurological: Negative.    Hematological: Negative.    Psychiatric/Behavioral: Negative.      Otherwise complete ROS is negative except as mentioned above.     Past Medical History:   Medical History        Past Medical History:   Diagnosis Date    Bradycardia      Cardiomyopathy      CHF (congestive heart failure)      Chronic kidney disease      Coronary arteriosclerosis       Diabetes mellitus      Diverticulitis      GERD (gastroesophageal reflux disease)      HLD (hyperlipidemia)      HTN (hypertension)      Hx of pulmonary embolus      Hypercholesterolemia      LBBB (left bundle branch block)      Obesity      Pulmonary embolism      Rectal bleeding      RLS (restless legs syndrome)      Vitamin D deficiency           Past Surgical History:  Surgical History         Past Surgical History:   Procedure Laterality Date    APPENDECTOMY   1970    CARDIAC CATHETERIZATION   2021     AT [facility]- Dr. Jasso - mild, diffuse, non-obstructive CAD    CHOLECYSTECTOMY   1991    HYSTERECTOMY   1989    LEG SURGERY Left 2015     CALCIFIED TUMOR    TUBAL ABDOMINAL LIGATION   1982    TUMOR REMOVAL             Social History:  reports that she quit smoking about 27 years ago. Her smoking use included cigarettes. She started smoking about 48 years ago. She has a 5.00 pack-year smoking history. She has never used smokeless tobacco. She reports that she does not currently use alcohol. She reports that she does not use drugs.     Family History: family history includes Diabetes type II in her brother, father, mother, and paternal grandmother; Heart disease in her brother, father, maternal grandfather, maternal grandmother, mother, and paternal grandfather; Hypertension in her brother, father, mother, and sister; Stroke in her sister; Ulcerative colitis in her sister.      Allergies:         Allergies   Allergen Reactions    Cephalexin Shortness Of Breath    Cheese Unknown - High Severity       BLUE CHEESE: tongue swells    Codeine Shortness Of Breath    Levaquin [Levofloxacin] Anaphylaxis    Morphine Shortness Of Breath    Naproxen Shortness Of Breath    Penicillins Shortness Of Breath    Aspirin Other (See Comments)    Cetirizine Dizziness    Ciprofloxacin Other (See Comments)       Was taking zanaflex at the time and there is a drug interaction    Gabapentin Swelling    Keppra [Levetiracetam] Other (See  Comments)       VISION LOSS    Lisinopril Other (See Comments)       Dry cough    Meloxicam Nausea Only    Vancomycin Other (See Comments)       Vision loss    Sulfa Antibiotics Rash      Medications: Scheduled Meds:atorvastatin, 20 mg, Oral, Nightly  DULoxetine, 30 mg, Oral, Nightly  empagliflozin, 10 mg, Oral, Daily  furosemide, 40 mg, Oral, Daily  glipizide, 10 mg, Oral, BID AC  insulin lispro, 2-9 Units, Subcutaneous, 4x Daily AC & at Bedtime  losartan, 25 mg, Oral, Q24H  metoprolol succinate XL, 100 mg, Oral, Daily  metoprolol succinate XL, 50 mg, Oral, Nightly  montelukast, 10 mg, Oral, Nightly  pantoprazole, 40 mg, Oral, Q AM  rivaroxaban, 20 mg, Oral, Nightly  senna-docusate sodium, 2 tablet, Oral, BID  sodium chloride, 10 mL, Intravenous, Q12H        Continuous Infusions:   PRN Meds:.  senna-docusate sodium **AND** polyethylene glycol **AND** bisacodyl **AND** bisacodyl    cloNIDine    dextrose    dextrose    glucagon (human recombinant)    nitroglycerin    sodium chloride    sodium chloride    tiZANidine    traMADol    zolpidem     I have utilized all available immediate resources to obtain, update, or review the patient's current medications (including all prescriptions, over-the-counter products, herbals, cannabis/cannabidiol products, and vitamin/mineral/dietary (nutritional) supplements).      Objective      Objective []Expand by Default      Physical Exam:   Temp:  [97.7 °F (36.5 °C)-98.2 °F (36.8 °C)] 98 °F (36.7 °C)  Heart Rate:  [71-87] 73  Resp:  [16-20] 16  BP: (123-157)/(51-98) 145/75  Physical Exam  Constitutional:       General: She is not in acute distress.     Appearance: Normal appearance. She is obese. She is not toxic-appearing.   HENT:      Head: Normocephalic and atraumatic.      Right Ear: External ear normal.      Left Ear: External ear normal.      Nose: Nose normal.      Mouth/Throat:      Mouth: Mucous membranes are moist.      Pharynx: Oropharynx is clear.   Eyes:      General: No  scleral icterus.     Extraocular Movements: Extraocular movements intact.      Conjunctiva/sclera: Conjunctivae normal.      Pupils: Pupils are equal, round, and reactive to light.   Cardiovascular:      Rate and Rhythm: Normal rate and regular rhythm.      Pulses: Normal pulses.      Heart sounds: Normal heart sounds. No murmur heard.  Pulmonary:      Effort: Pulmonary effort is normal. No respiratory distress.      Breath sounds: Normal breath sounds.   Abdominal:      General: Abdomen is protuberant. Bowel sounds are normal.      Palpations: Abdomen is soft. There is no mass.      Tenderness: There is no abdominal tenderness.   Musculoskeletal:         General: Normal range of motion.      Right lower leg: No edema.      Left lower leg: No edema.   Skin:     General: Skin is warm and dry.      Coloration: Skin is not pale.   Neurological:      General: No focal deficit present.      Mental Status: She is alert and oriented to person, place, and time. Mental status is at baseline.      Cranial Nerves: No cranial nerve deficit.   Psychiatric:         Mood and Affect: Mood normal.         Judgment: Judgment normal.            Results Reviewed:  I have personally reviewed current lab, radiology, and data and agree with results.  Lab Results (last 24 hours)         Procedure Component Value Units Date/Time     POC Glucose Once [[id number]]  (Abnormal) Collected: 06/07/23 1646     Specimen: Blood Updated: 06/07/23 1657       Glucose 190 mg/dL         Comment: : [id number] Mcdonough AdrianaMeter ID: [id number]        POC Glucose Once [[id number]]  (Abnormal) Collected: 06/07/23 1145     Specimen: Blood Updated: 06/07/23 1155       Glucose 188 mg/dL         Comment: : [id number] Mcdonough AdrianaMeter ID: [id number]        C-reactive Protein [[id number]]  (Normal) Collected: 06/07/23 0519     Specimen: Blood Updated: 06/07/23 1120       C-Reactive Protein 0.41 mg/dL       Sedimentation Rate [[id number]]  (Abnormal) Collected:  06/07/23 0802     Specimen: Blood Updated: 06/07/23 1112       Sed Rate 35 mm/hr       CBC & Differential [[id number]]  (Abnormal) Collected: 06/07/23 0802     Specimen: Blood Updated: 06/07/23 0836     Narrative:       The following orders were created for panel order CBC & Differential.  Procedure                               Abnormality         Status                     ---------                               -----------         ------                     CBC Auto Differential[[id number]]        Abnormal            Final result                  Please view results for these tests on the individual orders.     CBC Auto Differential [[id number]]  (Abnormal) Collected: 06/07/23 0802     Specimen: Blood Updated: 06/07/23 0836       WBC 17.05 10*3/mm3         RBC 4.48 10*6/mm3         Hemoglobin 8.7 g/dL         Hematocrit 32.5 %         MCV 72.5 fL         MCH 19.4 pg         MCHC 26.8 g/dL         RDW 16.0 %         RDW-SD 41.1 fl         MPV 11.3 fL         Platelets 481 10*3/mm3         Neutrophil % 73.7 %         Lymphocyte % 16.9 %         Monocyte % 6.5 %         Eosinophil % 1.6 %         Basophil % 0.8 %         Neutrophils, Absolute 12.59 10*3/mm3         Lymphocytes, Absolute 2.88 10*3/mm3         Monocytes, Absolute 1.10 10*3/mm3         Eosinophils, Absolute 0.27 10*3/mm3         Basophils, Absolute 0.13 10*3/mm3       POC Glucose Once [[id number]]  (Abnormal) Collected: 06/07/23 0737     Specimen: Blood Updated: 06/07/23 0747       Glucose 180 mg/dL         Comment: : [id number] Mcdonough AdrianaMeter ID: [id number]        Basic Metabolic Panel [[id number]]  (Abnormal) Collected: 06/07/23 0519     Specimen: Blood Updated: 06/07/23 0606       Glucose 187 mg/dL         BUN 16 mg/dL         Creatinine 0.68 mg/dL         Sodium 138 mmol/L         Potassium 3.5 mmol/L         Chloride 99 mmol/L         CO2 27.0 mmol/L         Calcium 9.5 mg/dL         BUN/Creatinine Ratio 23.5       Anion Gap 12.0 mmol/L          eGFR 96.2 mL/min/1.73       Narrative:       GFR Normal >60  Chronic Kidney Disease <60  Kidney Failure <15        POC Glucose Once [[id number]]  (Abnormal) Collected: 06/06/23 2000     Specimen: Blood Updated: 06/06/23 2010       Glucose 231 mg/dL         Comment: : [id number] Jeannette AlfonsoekaMeter ID: [id number]        Blood Culture - Blood, Arm, Left [[id number]]  (Normal) Collected: 06/05/23 1920     Specimen: Blood from Arm, Left Updated: 06/06/23 1945       Blood Culture No growth at 24 hours             Imaging Results (Last 24 Hours)         ** No results found for the last 24 hours. **                Assessment / Plan   Assessment:        Active Hospital Problems     Diagnosis      Tachycardia      Elevated white blood cell count      Fever      Type 2 diabetes mellitus with hyperglycemia, without long-term current use of insulin      Chronic systolic heart failure      Essential hypertension           Treatment Plan  Recheck CBC with differential in a.m.   Continue to observe temperature curve  No antimicrobial treatment recommended  If white blood cell count trend flattens/decreases then I would not hesitate to allow discharge and have her follow-up with primary care physician for repeat white count as an outpatient.     Medical Decision Making  Number and Complexity of problems:   1) leukocytosis, acute, moderate complexity  2) T2DM, chronic, moderate complexity  3) chronic systolic heart failure, chronic, moderate complexity  4) hypertension, chronic, moderate complexity     Differential Diagnosis: Infectious disease, rheumatologic disorder     Conditions and Status        Condition is improving.     [facility] Data  External documents reviewed: Care Everywhere documents  Cardiac tracing (EKG, telemetry) interpretation: See HPI  Radiology interpretation: See HPI  Labs reviewed: See HPI  Any tests that were considered but not ordered: None     Decision rules/scores evaluated (example [id number], Wells, etc):  None     Discussed with: The patient and Jeison Forde     Care Planning  Shared decision making: The patient  Code status and discussions: Full code     Disposition  Social Determinants of Health that impact treatment or disposition: None noted  I expect the patient to be discharged by the primary service.      I confirmed that the patient's Advance Care Plan is present, code status is documented, or surrogate decision maker is listed in the patient's medical record.      The patient's surrogate decision maker is her .      Patient seen and examined by me on 6/7/2023 at 1800.     Electronically signed by Saol Acevedo DO, 06/07/23, 18:25 CDT.                                 Jeison Forde APRN   Nurse Practitioner  Cardiology     Progress Notes     Attested     Date of Service: 06/07/23 1005  Creation Time: 06/07/23 1005     Attested           Attestation signed by Jake Hernandez DO at 06/07/23 1901     I have reviewed this documentation and agree.               Expand All Collapse All    [facility] -  Progress Note      LOS: 2 days   Patient Care Team:  Virginia Rey APRN as PCP - General (Family Medicine)  Provider, No Known as PCP - Family Medicine  Nancy Sethi APRN as Nurse Practitioner (Cardiology)     Chief Complaint:Chills        Subjective         Interval History:      Patient Complaints:   Notes overall doesn't feel good. Intermittent chills and swears. Nauseated. Leg swelling much better. Profound urine output. Intermittent heart racing            Review of Systems:      Review of Systems   Constitutional:  Positive for chills and fever. Negative for fatigue and unexpected weight change.   HENT:  Negative for nosebleeds.    Eyes:  Negative for visual disturbance.   Respiratory:  Negative for chest tightness and shortness of breath.    Cardiovascular:  Positive for leg swelling. Negative for chest pain and palpitations.    Gastrointestinal:  Positive for nausea. Negative for abdominal pain, diarrhea and vomiting.   Endocrine: Negative for cold intolerance.   Genitourinary:  Negative for dysuria.   Musculoskeletal:  Negative for back pain.   Skin:  Negative for pallor and wound.   Allergic/Immunologic: Negative for environmental allergies.   Neurological:  Negative for dizziness, light-headedness and headaches.   Hematological:  Does not bruise/bleed easily.   Psychiatric/Behavioral:  Negative for behavioral problems.          Objective []Expand by Default        Vital Sign Min/Max for last 24 hours  Temp  Min: 97.7 °F (36.5 °C)  Max: 98.2 °F (36.8 °C)   BP  Min: 123/66  Max: 174/79   Pulse  Min: 63  Max: 87   Resp  Min: 16  Max: 20   SpO2  Min: 94 %  Max: 97 %   No data recorded   No data recorded      Vitals             06/05/23 2016   Weight: 98.5 kg (217 lb 3.2 oz)               Intake/Output Summary (Last 24 hours) at 6/7/2023 1012  Last data filed at 6/7/2023 0900      Gross per 24 hour   Intake 720 ml   Output 5000 ml   Net -4280 ml         Telemetry: NSR with episodes of atrial flutter        Physical Exam:     Constitutional:       Appearance: Healthy appearance. Not in distress.   Eyes:      Pupils: Pupils are equal, round, and reactive to light.   HENT:      Head: Normocephalic and atraumatic.      Nose: Nose normal.    Mouth/Throat:      Dentition: Normal.   Pulmonary:      Effort: Pulmonary effort is normal.      Breath sounds: Normal breath sounds.   Chest:      Chest wall: Not tender to palpatation.   Cardiovascular:      PMI at left midclavicular line. Normal rate. Regular rhythm.      Murmurs: There is no murmur.   Pulses:     Intact distal pulses.   Edema:     Peripheral edema (edema much improved) present.     Ankle: bilateral trace pitting edema of the ankle.  Abdominal:      General: Bowel sounds are normal.      Palpations: Abdomen is soft.   Musculoskeletal: Normal range of motion.      Cervical back: Normal  range of motion. Skin:     General: Skin is warm and dry.   Neurological:      Mental Status: Alert, oriented to person, place, and time and oriented to person, place and time.   Psychiatric:         Attention and Perception: Attention normal.         Mood and Affect: Mood normal.      Results Review:   Lab Results (last 72 hours)         Procedure Component Value Units Date/Time     CBC & Differential [[id number]]  (Abnormal) Collected: 06/07/23 0802     Specimen: Blood Updated: 06/07/23 0836     Narrative:       The following orders were created for panel order CBC & Differential.  Procedure                               Abnormality         Status                     ---------                               -----------         ------                     CBC Auto Differential[[id number]]        Abnormal            Final result                  Please view results for these tests on the individual orders.     CBC Auto Differential [[id number]]  (Abnormal) Collected: 06/07/23 0802     Specimen: Blood Updated: 06/07/23 0836       WBC 17.05 10*3/mm3         RBC 4.48 10*6/mm3         Hemoglobin 8.7 g/dL         Hematocrit 32.5 %         MCV 72.5 fL         MCH 19.4 pg         MCHC 26.8 g/dL         RDW 16.0 %         RDW-SD 41.1 fl         MPV 11.3 fL         Platelets 481 10*3/mm3         Neutrophil % 73.7 %         Lymphocyte % 16.9 %         Monocyte % 6.5 %         Eosinophil % 1.6 %         Basophil % 0.8 %         Neutrophils, Absolute 12.59 10*3/mm3         Lymphocytes, Absolute 2.88 10*3/mm3         Monocytes, Absolute 1.10 10*3/mm3         Eosinophils, Absolute 0.27 10*3/mm3         Basophils, Absolute 0.13 10*3/mm3       POC Glucose Once [[id number]]  (Abnormal) Collected: 06/07/23 0737     Specimen: Blood Updated: 06/07/23 0747       Glucose 180 mg/dL         Comment: : [id number] Mcdonough AdrianaMeter ID: [id number]        Basic Metabolic Panel [[id number]]  (Abnormal) Collected: 06/07/23 0519     Specimen: Blood  Updated: 06/07/23 0606       Glucose 187 mg/dL         BUN 16 mg/dL         Creatinine 0.68 mg/dL         Sodium 138 mmol/L         Potassium 3.5 mmol/L         Chloride 99 mmol/L         CO2 27.0 mmol/L         Calcium 9.5 mg/dL         BUN/Creatinine Ratio 23.5       Anion Gap 12.0 mmol/L         eGFR 96.2 mL/min/1.73       Narrative:       GFR Normal >60  Chronic Kidney Disease <60  Kidney Failure <15        POC Glucose Once [[id number]]  (Abnormal) Collected: 06/06/23 2000     Specimen: Blood Updated: 06/06/23 2010       Glucose 231 mg/dL         Comment: : [id number] Jeannette [facility]ka[facility] ID: [id number]        Blood Culture - Blood, Arm, Left [[id number]]  (Normal) Collected: 06/05/23 1920     Specimen: Blood from Arm, Left Updated: 06/06/23 1945       Blood Culture No growth at 24 hours     POC Glucose Once [[id number]]  (Abnormal) Collected: 06/06/23 1620     Specimen: Blood Updated: 06/06/23 1631       Glucose 213 mg/dL         Comment: : [id number] Juan Pablo [facility]ana[facility] ID: [id number]        BNP [[id number]]  (Abnormal) Collected: 06/06/23 0620     Specimen: Blood Updated: 06/06/23 1509       proBNP 1,556.0 pg/mL       Narrative:       Among patients with dyspnea, NT-proBNP is highly sensitive for the detection of acute congestive heart failure. In addition NT-proBNP of <300 pg/ml effectively rules out acute congestive heart failure with 99% negative predictive value.     Results may be falsely decreased if patient taking Biotin.        Respiratory Panel PCR w/COVID-19(SARS-CoV-2) DIVINA/CRUZ/ZIGGY/PAD/COR/MAD/EMERALD In-House, NP Swab in [facility], 3-4 HR TAT - Swab, Nasopharynx [[id number]]  (Normal) Collected: 06/06/23 1240     Specimen: Swab from Nasopharynx Updated: 06/06/23 1335       ADENOVIRUS, PCR Not Detected       Coronavirus 229E Not Detected       Coronavirus HKU1 Not Detected       Coronavirus NL63 Not Detected       Coronavirus OC43 Not Detected       COVID19 Not Detected       Human Metapneumovirus Not  Detected       Human Rhinovirus/Enterovirus Not Detected       Influenza A PCR Not Detected       Influenza B PCR Not Detected       Parainfluenza Virus 1 Not Detected       Parainfluenza Virus 2 Not Detected       Parainfluenza Virus 3 Not Detected       Parainfluenza Virus 4 Not Detected       RSV, PCR Not Detected       Bordetella pertussis pcr Not Detected       Bordetella parapertussis PCR Not Detected       Chlamydophila pneumoniae PCR Not Detected       Mycoplasma pneumo by PCR Not Detected     Narrative:       In the setting of a positive respiratory panel with a viral infection PLUS a negative procalcitonin without other underlying concern for bacterial infection, consider observing off antibiotics or discontinuation of antibiotics and continue supportive care. If the respiratory panel is positive for atypical bacterial infection (Bordetella pertussis, Chlamydophila pneumoniae, or Mycoplasma pneumoniae), consider antibiotic de-escalation to target atypical bacterial infection.     Urinalysis, Microscopic Only - Urine, Clean Catch [[id number]]  (Abnormal) Collected: 06/06/23 1239     Specimen: Urine, Clean Catch Updated: 06/06/23 1301       RBC, UA None Seen /HPF         WBC, UA 0-2 /HPF         Bacteria, UA None Seen /HPF         Squamous Epithelial Cells, UA None Seen /HPF         Hyaline Casts, UA None Seen /LPF         Methodology Automated Microscopy     Urinalysis With Microscopic If Indicated (No Culture) - Urine, Clean Catch [[id number]]  (Abnormal) Collected: 06/06/23 1239     Specimen: Urine, Clean Catch Updated: 06/06/23 1301       Color, UA Yellow       Appearance, UA Clear       pH, UA 6.5       Specific Gravity, UA <=1.005       Glucose,  mg/dL (Trace)       Ketones, UA Negative       Bilirubin, UA Negative       Blood, UA Negative       Protein, UA Negative       Leuk Esterase, UA Trace       Nitrite, UA Negative       Urobilinogen, UA 0.2 E.U./dL     CBC (No Diff) [[id number]]  (Abnormal)  "Collected: 06/06/23 1206     Specimen: Blood Updated: 06/06/23 1255       WBC 14.69 10*3/mm3         RBC 4.18 10*6/mm3         Hemoglobin 8.2 g/dL         Hematocrit 29.9 %         MCV 71.5 fL         MCH 19.6 pg         MCHC 27.4 g/dL         RDW 15.8 %         RDW-SD 40.2 fl         MPV 11.6 fL         Platelets 483 10*3/mm3       POC Glucose Once [[id number]]  (Abnormal) Collected: 06/06/23 1153     Specimen: Blood Updated: 06/06/23 1204       Glucose 221 mg/dL         Comment: : [id number] Juan Pablo Hilliard ID: [id number]        Procalcitonin [[id number]]  (Normal) Collected: 06/06/23 0620     Specimen: Blood Updated: 06/06/23 1201       Procalcitonin 0.02 ng/mL       Narrative:       As a Marker for Sepsis (Non-Neonates):     1. <0.5 ng/mL represents a low risk of severe sepsis and/or septic shock.  2. >2 ng/mL represents a high risk of severe sepsis and/or septic shock.     As a Marker for Lower Respiratory Tract Infections that require antibiotic therapy:     PCT on Admission    Antibiotic Therapy       6-12 Hrs later     >0.5                Strongly Recommended  >0.25 - <0.5        Recommended   0.1 - 0.25          Discouraged              Remeasure/reassess PCT  <0.1                Strongly Discouraged     Remeasure/reassess PCT     As 28 day mortality risk marker: \"Change in Procalcitonin Result\" (>80% or <=80%) if Day 0 (or Day 1) and Day 4 values are available. Refer to http://[email or web address]-pct-calculator.[email or web address]     Change in PCT <=80%  A decrease of PCT levels below or equal to 80% defines a positive change in PCT test result representing a higher risk for 28-day all-cause mortality of patients diagnosed with severe sepsis for septic shock.     Change in PCT >80%  A decrease of PCT levels of more than 80% defines a negative change in PCT result representing a lower risk for 28-day all-cause mortality of patients diagnosed with severe sepsis or septic shock.         POC Glucose Once [[id number]]  (Abnormal) " Collected: 06/06/23 0802     Specimen: Blood Updated: 06/06/23 0814       Glucose 232 mg/dL         Comment: : [id number] Juan Pablo JonesaMeter ID: [id number]        Basic Metabolic Panel [[id number]]  (Abnormal) Collected: 06/06/23 0620     Specimen: Blood Updated: 06/06/23 0656       Glucose 249 mg/dL         BUN 9 mg/dL         Creatinine 0.56 mg/dL         Sodium 137 mmol/L         Potassium 3.5 mmol/L         Chloride 100 mmol/L         CO2 27.0 mmol/L         Calcium 9.0 mg/dL         BUN/Creatinine Ratio 16.1       Anion Gap 10.0 mmol/L         eGFR 100.8 mL/min/1.73       Narrative:       GFR Normal >60  Chronic Kidney Disease <60  Kidney Failure <15        POC Glucose Once [[id number]]  (Abnormal) Collected: 06/05/23 2015     Specimen: Blood Updated: 06/05/23 2026       Glucose 212 mg/dL         Comment: : [id number] Noble LyneMeter ID: [id number]        Basic Metabolic Panel [[id number]]  (Abnormal) Collected: 06/05/23 1707     Specimen: Blood Updated: 06/05/23 1824       Glucose 198 mg/dL         BUN 9 mg/dL         Creatinine 0.53 mg/dL         Sodium 137 mmol/L         Potassium 3.8 mmol/L         Chloride 100 mmol/L         CO2 26.0 mmol/L         Calcium 8.7 mg/dL         BUN/Creatinine Ratio 17.0       Anion Gap 11.0 mmol/L         eGFR 102.1 mL/min/1.73       Narrative:       GFR Normal >60  Chronic Kidney Disease <60  Kidney Failure <15        CBC & Differential [[id number]]  (Abnormal) Collected: 06/05/23 1707     Specimen: Blood Updated: 06/05/23 1757     Narrative:       The following orders were created for panel order CBC & Differential.  Procedure                               Abnormality         Status                     ---------                               -----------         ------                     CBC Auto Differential[[id number]]        Abnormal            Final result                  Please view results for these tests on the individual orders.     CBC Auto Differential  [[id number]]  (Abnormal) Collected: 06/05/23 1707     Specimen: Blood Updated: 06/05/23 1757       WBC 13.93 10*3/mm3         RBC 4.18 10*6/mm3         Hemoglobin 8.2 g/dL         Hematocrit 31.1 %         MCV 74.4 fL         MCH 19.6 pg         MCHC 26.4 g/dL         RDW 15.6 %         RDW-SD 41.5 fl         MPV 11.5 fL         Platelets 456 10*3/mm3         Neutrophil % 71.2 %         Lymphocyte % 20.0 %         Monocyte % 5.9 %         Eosinophil % 1.7 %         Basophil % 0.8 %         Neutrophils, Absolute 9.93 10*3/mm3         Lymphocytes, Absolute 2.79 10*3/mm3         Monocytes, Absolute 0.82 10*3/mm3         Eosinophils, Absolute 0.23 10*3/mm3         Basophils, Absolute 0.11 10*3/mm3               Results for orders placed during the hospital encounter of 06/05/23     Adult Transesophageal Echo (EVAN) W/ Cont if Necessary Per Protocol     Interpretation Summary    Left ventricular systolic function is normal. Left ventricular ejection fraction appears to be 61 - 65%.    Left ventricular wall thickness is consistent with hypertrophy.    No evidence of a left atrial appendage thrombus was present.    Saline test results are negative for right to left atrial level shunt.    Here is moderate calcification of the aortic valve mainly affecting the left coronary cusp(s). Trace aortic valve regurgitation is present.    There is no evidence of an aortic valve mass is present. No evidence of an aortic valve abscess is present.    Mild mitral valve regurgitation is present with multiple jets noted.        Medication Review: yes  Current Medications             Current Facility-Administered Medications   Medication Dose Route Frequency Provider Last Rate Last Admin    atorvastatin (LIPITOR) tablet 20 mg  20 mg Oral Nightly Jake Hernandez DO   20 mg at 06/06/23 2056    sennosides-docusate (PERICOLACE) 8.6-50 MG per tablet 2 tablet  2 tablet Oral BID Steve Andres APRN   2 tablet at 06/07/23 0825     And     polyethylene glycol (MIRALAX) packet 17 g  17 g Oral Daily PRN Steve Andres APRN         And    bisacodyl (DULCOLAX) EC tablet 5 mg  5 mg Oral Daily PRN Steve Andres APRN         And    bisacodyl (DULCOLAX) suppository 10 mg  10 mg Rectal Daily PRN Steve Andres APRN        cloNIDine (CATAPRES) tablet 0.1 mg  0.1 mg Oral TID PRN Steve Andres APRN   0.1 mg at 06/06/23 1531    dextrose (D50W) (25 g/50 mL) IV injection 25 g  25 g Intravenous Q15 Min PRN Steve Andres APRN        dextrose (GLUTOSE) oral gel 15 g  15 g Oral Q15 Min PRN Steve Andres APRN        DULoxetine (CYMBALTA) DR capsule 30 mg  30 mg Oral Nightly Jake Hernandez, DO   30 mg at 06/06/23 2057    empagliflozin (JARDIANCE) tablet 10 mg  10 mg Oral Daily Jeison Forde APRN   10 mg at 06/07/23 0826    glipizide (GLUCOTROL) tablet 10 mg  10 mg Oral BID AC Steve Andres APRN   10 mg at 06/07/23 0826    glucagon (GLUCAGEN) injection 1 mg  1 mg Intramuscular Q15 Min PRN Steve Andres APRN        Insulin Lispro (humaLOG) injection 2-9 Units  2-9 Units Subcutaneous 4x Daily AC & at Bedtime Steve Andres APRN   2 Units at 06/07/23 0826    losartan (COZAAR) tablet 25 mg  25 mg Oral Q24H Jeison Forde APRN   25 mg at 06/07/23 0825    metoprolol succinate XL (TOPROL-XL) 24 hr tablet 100 mg  100 mg Oral Daily Jeison Forde APRN   100 mg at 06/07/23 0825    montelukast (SINGULAIR) tablet 10 mg  10 mg Oral Nightly Jake Hernandez, DO   10 mg at 06/06/23 2056    nitroglycerin (NITROSTAT) SL tablet 0.4 mg  0.4 mg Sublingual Q5 Min PRN Steve Andres APRN        pantoprazole (PROTONIX) EC tablet 40 mg  40 mg Oral Q AM Steve Andres APRN   40 mg at 06/07/23 0544    rivaroxaban (XARELTO) tablet 20 mg  20 mg Oral Nightly Steve Andres APRN   20 mg at 06/06/23 2056    sodium chloride 0.9 % flush 10 mL  10 mL Intravenous Q12H Steve Andres APRN   10 mL at 06/07/23 0826    sodium chloride  "0.9 % flush 10 mL  10 mL Intravenous PRN Steve Andres APRN        sodium chloride 0.9 % infusion 40 mL  40 mL Intravenous PRN Steve Andres APRN        tiZANidine (ZANAFLEX) tablet 4 mg  4 mg Oral Q12H PRN Steve Andres APRN   4 mg at 06/06/23 1515    traMADol (ULTRAM) tablet 50 mg  50 mg Oral Q6H PRN Jake Hernandez, DO   50 mg at 06/07/23 0825    zolpidem (AMBIEN) tablet 10 mg  10 mg Oral Nightly PRN Jake Hernandez, DO   10 mg at 06/06/23 2055                     Assessment & Plan          Essential hypertension    Chronic systolic heart failure    Type 2 diabetes mellitus with hyperglycemia, without long-term current use of insulin    Tachycardia    Elevated white blood cell count    Fever     Plan:  Fever- continued chills and \"feeling hot\" overnight. No documented fever. White count continues to elevate. Will consult hospitilast for assistance.      Anemia - unclear cause. Will consult hospitalist      Abnormal platelet- will consult hospitalist team     Leukocytosis - WBC continues to increase     Atrial Flutter- continued intermittent episodes despite increase in Toprol. Consider antiarrythmic? Anticoagulated with Xarelto      Pulmonary Embolism- remote. Chronic anticoagulation.      Systolic Heart Failure- . With a now recovered LVEF. appeared volume overloaded yesterday. Given 1 dose of IV Lasix with good urine response. Start Po Lasix today. On Losartan. Jardiance and Toprol.      Patient stable from a cardiac standpoint. But CBC continues to be abnormal with chills and sweats per patient.      Electronically signed by GUILHERME Ge, 06/07/23, 10:05 AM CDT.            06/23 1700 -- -- -- 125/93 Sitting -- --   06/06/23 1523 97.9 (36.6) 63 18 167/78 Lying room air 97   06/06/23 1150 98.1 (36.7) 67 18 174/79  Lying room air 95   BP: RN notified at 06/06/23 1150   06/06/23 0759 97.9 (36.6) 73 18 157/90 Lying room air 98   06/06/23 0356 97.6 (36.4) 97 18 157/68 Lying " "room air 96   23 0015 97.5 (36.4) 73 16 145/51 Lying room air 96        23 0700 98.8 (37.1) 87 18 153/90 Lying room air 95   23 0504 98.3 (36.8) 76 16 158/71 Lying room air 97   23 98.1 (36.7) 81 16 151/77 Sitting room air 98   23                              [facility] CARDIOLOGY  [address] [facility] [address]  [address] [facility] [address] [phone]          Damaris Garcias  Two-Dimensional Transesophageal Echocardiogram with Limited Doppler and Color Flow  Order# [id number]  Reading physician: Jake Hernandez DO Ordering physician: Jake Hernandez DO Study date: 23       Patient Information    Patient Name  Damaris Garcias MRN  [id number] Legal Sex  Female  (Age)  1955 (68 y.o.)       Admission Information    Admission Date/Time Discharge Date/Time Room/Bed   23  12:41 PM  451/1       Patient Hx Of Height, Weight, and Vitals    Height Weight BSA (Calculated - sq m) BMI (Calculated) Retired BMI (kg/m2) Pulse BP   160 cm (63\") 98.4 kg (217 lb) 2 sq meters 38.4  72 186/85          [facility] PACS Images     Show images for Adult Transesophageal Echo (EVAN) W/ Cont if Necessary Per Protocol       Clinical Indication    Endocarditis; Valvular Vegetation on TTE       Interpretation Summary         Left ventricular systolic function is normal. Left ventricular ejection fraction appears to be 61 - 65%.    Left ventricular wall thickness is consistent with hypertrophy.    No evidence of a left atrial appendage thrombus was present.    Saline test results are negative for right to left atrial level shunt.    Here is moderate calcification of the aortic valve mainly affecting the left coronary cusp(s). Trace aortic valve regurgitation is present.    There is no evidence of an aortic valve mass is present. No evidence of an aortic valve abscess is present.    Mild mitral valve regurgitation is present with multiple jets noted.   "         Cardiac History    Diagnosis Date Comment Source   CHF (congestive heart failure)      Coronary arteriosclerosis      Diabetes mellitus      HLD (hyperlipidemia)      HTN (hypertension)      Hypercholesterolemia      LBBB (left bundle branch block)          Social History      Tobacco Use    Former; Cigarettes: 1975 - 1996; 0.25 packs/day for 20.00 years   Smokeless Tobacco: Never used smokeless tobacco.     Vaping Use    Former; Substances: Flavoring  TRIED IT- DIDNT LIKE IT        Family Cardiac History as of 6/5/2023    Problem Relation Age of Onset   Heart disease Brother    Heart disease Father    Heart disease Maternal Grandfather    Heart disease Maternal Grandmother    Heart disease Mother    Heart disease Paternal Grandfather    Hypertension Brother    Hypertension Father    Hypertension Mother    Hypertension Sister          Additional Study Details    Study Quality The study is technically adequate for diagnosis.   Shunt Assessment Verbal consent was obtained for use of agitated saline to assess for shunting.   EVAN Procedure Details Informed consent for Transesophageal Echocardiogram, and use of contrast as needed, was obtained prior to the procedure. The procedure was performed in the Cardio-Vascular/Close observation unit. Patient was noted to be in a fasting state, with a peripheral IV in place.   A bite block was placed for probe protection. Lidocaine spray was used as an oropharyngeal topical anesthetic. Conscious sedation was utilized.  The following medication(s) were administered during the procedure:        100 mcg of Fentanyl.         3 mg of Versed.      An adult multi-frequency, multiplane transesophageal echocardiographic endoscope was inserted and manipulated in the standard fashion to achieve multiplane views. Transesophageal probe was able to be passed without difficulty.  Usual basal, mid-esophageal, transgastric, and aortic views were obtained.   EVAN probe identification 3.   The  patient's vital signs, including blood pressure, heart rate, pulse oximetry, and cardiac rhythm were monitored throughout the procedure. Vitals signs remained stable throughout the study. Post-procedural recovery was completed in the Cardio-Vascular/Close Observation Unit, The patient tolerated the procedure without evidence of oropharyngeal or esophageal trauma.       Echocardiogram Findings    Left Ventricle Left ventricular systolic function is normal. Left ventricular ejection fraction appears to be 61 - 65%.     Normal left ventricular cavity size noted. Left ventricular wall thickness is consistent with hypertrophy.   Right Ventricle Normal right ventricular cavity size and systolic function noted.   Left Atrium Normal left atrial cavity size noted. Doppler interrogation shows normal flow within the left atrial appendage. No evidence of a left atrial appendage thrombus was present.  No evidence of a patent foramen ovale. No evidence of an atrial septal defect present.  Saline test results are negative for right to left atrial level shunt.   Right Atrium Normal right atrial cavity size noted.   Aortic Valve The aortic valve is abnormal in structure. There is moderate calcification of the aortic valve mainly affecting the left coronary cusp(s). Trace aortic valve regurgitation is present. No hemodynamically significant aortic valve stenosis is present. There is no evidence of an aortic valve mass is present. No evidence of an aortic valve abscess is present.   Mitral Valve The mitral valve is structurally normal with no significant stenosis present. Mild mitral valve regurgitation is present with multiple jets noted.   Tricuspid Valve The tricuspid valve is structurally normal with no significant regurgitation or significant stenosis present.   Pulmonic Valve The pulmonic valve is structurally normal with no regurgitation or significant stenosis present.   Greater Vessels No dilation of the ascending aorta is  present. There is no plaque in the ascending aorta present.                                               Current Facility-Administered Medications   Medication Dose Route Frequency Provider Last Rate Last Admin    atorvastatin (LIPITOR) tablet 20 mg  20 mg Oral Nightly Jake Hernandez DO   20 mg at 06/07/23 2102    sennosides-docusate (PERICOLACE) 8.6-50 MG per tablet 2 tablet  2 tablet Oral BID Steve Andres APRN   2 tablet at 06/08/23 0923    And    polyethylene glycol (MIRALAX) packet 17 g  17 g Oral Daily PRN Steve Andres APRN        And    bisacodyl (DULCOLAX) EC tablet 5 mg  5 mg Oral Daily PRN Steve Andres APRN        And    bisacodyl (DULCOLAX) suppository 10 mg  10 mg Rectal Daily PRN Steve Andres APRN        cloNIDine (CATAPRES) tablet 0.1 mg  0.1 mg Oral TID PRN Steve Andres APRN   0.1 mg at 06/06/23 1531    dextrose (D50W) (25 g/50 mL) IV injection 25 g  25 g Intravenous Q15 Min PRN Steve Andres APRN        dextrose (GLUTOSE) oral gel 15 g  15 g Oral Q15 Min PRN Steve Andres APRN        DULoxetine (CYMBALTA) DR capsule 30 mg  30 mg Oral Nightly Jake Hernandez DO   30 mg at 06/07/23 2059    empagliflozin (JARDIANCE) tablet 10 mg  10 mg Oral Daily Jeison Forde APRN   10 mg at 06/08/23 0923    furosemide (LASIX) tablet 40 mg  40 mg Oral Daily Jeison Forde APRN   40 mg at 06/08/23 0923    glipizide (GLUCOTROL) tablet 10 mg  10 mg Oral BID AC Steve Andres APRN   10 mg at 06/08/23 0923    glucagon (GLUCAGEN) injection 1 mg  1 mg Intramuscular Q15 Min PRN Steve Andres APRN        Insulin Lispro (humaLOG) injection 2-9 Units  2-9 Units Subcutaneous 4x Daily AC & at Bedtime Steve Andres APRN   2 Units at 06/08/23 0923    losartan (COZAAR) tablet 25 mg  25 mg Oral Q24H Jeison Forde APRN   25 mg at 06/08/23 0923    metoprolol succinate XL (TOPROL-XL) 24 hr tablet 100 mg  100 mg Oral Daily Jeison Forde APRN   100 mg at 06/08/23  0923    metoprolol succinate XL (TOPROL-XL) 24 hr tablet 50 mg  50 mg Oral Nightly Jake Hernandez, DO   50 mg at 06/07/23 2059    montelukast (SINGULAIR) tablet 10 mg  10 mg Oral Nightly FroJake dover, DO   10 mg at 06/07/23 2059    nitroglycerin (NITROSTAT) SL tablet 0.4 mg  0.4 mg Sublingual Q5 Min PRN Reasor, Steve S, APRN        pantoprazole (PROTONIX) EC tablet 40 mg  40 mg Oral Q AM Reasor, Steve S, APRN   40 mg at 06/08/23 0523    rivaroxaban (XARELTO) tablet 20 mg  20 mg Oral Nightly Reasor, Steve S, APRN   20 mg at 06/07/23 2102    sodium chloride 0.9 % flush 10 mL  10 mL Intravenous Q12H Reasor, Steve S, APRN   10 mL at 06/08/23 0924    sodium chloride 0.9 % flush 10 mL  10 mL Intravenous PRN Reasor, Steve S, APRN        sodium chloride 0.9 % infusion 40 mL  40 mL Intravenous PRN Reasor, Steve S, APRN        tiZANidine (ZANAFLEX) tablet 4 mg  4 mg Oral Q12H PRN Reasor, Steve VILLEGAS, APRN   4 mg at 06/07/23 2059    traMADol (ULTRAM) tablet 50 mg  50 mg Oral Q6H PRN Jake Hernandez, DO   50 mg at 06/08/23 0923    zolpidem (AMBIEN) tablet 10 mg  10 mg Oral Nightly PRN Jake Hernandez, DO   10 mg at 06/07/23 2059

## 2023-06-08 NOTE — NURSING NOTE
Patient informed RN she has no transportation back to [place] as her friend is who brought her in on admission. Patient also wants to speak to MD Curtis as she does not want to be discharged until tomorrow when she has a ride. Patient also mentioned that MD had mentioned about trending her WBC labs prior to DC, but Cardiology placed the orders to be discharged    MD on floor rounding at this time.

## 2023-06-08 NOTE — NURSING NOTE
Patient refusing to leave, stating she is waiting to see Dr. Acevedo and that she does not have a ride until tomorrow.   MD paged a 3rd time as no return call to prior pages. Answering service indicated he was working today

## 2023-06-08 NOTE — DISCHARGE SUMMARY
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  Patient follows with Nancy Sethi for her primary cardiology care.  She is followed for nonischemic cardiomyopathy with chronic systolic congestive heart failure as well as hypertension.  Recently the patient had echocardiogram which revealed recovery of her EF to 60 to 65% when it was down to 40% 2021.  However on this echocardiogram it was noted that she had a moderate thickening of the left coronary cusp of the aortic valve and that vegetation could be a concern.  When she was contacted by Nancy the patient complained of low blood pressures and fevers.  Given this it was decided the patient necessitated admission for further work-up for potential endocarditis.     Upon my examination the patient is sitting up comfortably in the bed.  She says she has had some worsening of her fatigue and shortness of breath over the last 2 to 3 weeks.  She says she has developed a fever as well.  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     She denies any chest pain, palpitations, presyncope, or syncope.    Hospital Course  A EVAN was completed on 6/5 to rule out aortic valve vegetation with results noting no evidence of an aortic valve mass or aortic valve abscess, normal LVEF, moderate calcification of the aortic valve with trace of AI and mild MR with multiple jets noted. She has been fever free since admission however her WBC " have remained elevated. Procalcitonin was obtained and noted to be normal. Blood culture, UA, and respiratory panel were also noted to be normal. Hospitalist was consulted for further assistance of leukocytosis which was felt to be secondary to acute stress response. WBC this morning has decreased to 15 from 17 yesterday. Her BP has remained borderline elevated throughout her stay. She has been started on Losartan and Jardiance in attempts to optimize CHF medications and has tolerated well. She has received 1x IV lasix 40mg and has been placed on 40mg PO Lasix daily and diuresed a total of 12.9L since her stay however appears to be up 8lbs since admission. Spoke with Dr. Acevedo about her drop in WBC and he agrees that she is felt safe for discharge home today.     Procedures Performed         Review of Systems   Constitutional:  Negative for diaphoresis, fatigue and unexpected weight change.   Respiratory:  Negative for chest tightness, shortness of breath and wheezing.    Cardiovascular:  Negative for chest pain, palpitations and leg swelling.   Gastrointestinal:  Negative for diarrhea, nausea and vomiting.   Neurological:  Negative for dizziness, syncope and light-headedness.   All other systems reviewed and are negative.    Consults:   Consults       Date and Time Order Name Status Description    6/7/2023 10:00 AM Inpatient Hospitalist Consult              Pertinent Test Results:  Lab Results (last 72 hours)       Procedure Component Value Units Date/Time    POC Glucose Once [[id number]]  (Abnormal) Collected: 06/08/23 0737    Specimen: Blood Updated: 06/08/23 0749     Glucose 164 mg/dL      Comment: : [id number] [facility] [id number]       CBC & Differential [[id number]]  (Abnormal) Collected: 06/08/23 0338    Specimen: Blood Updated: 06/08/23 0451    Narrative:      The following orders were created for panel order CBC & Differential.  Procedure                               Abnormality          Status                     ---------                               -----------         ------                     CBC Auto Differential[[id number]]        Abnormal            Final result                 Please view results for these tests on the individual orders.    CBC Auto Differential [[id number]]  (Abnormal) Collected: 06/08/23 0338    Specimen: Blood Updated: 06/08/23 0451     WBC 15.72 10*3/mm3      RBC 4.12 10*6/mm3      Hemoglobin 8.1 g/dL      Hematocrit 29.7 %      MCV 72.1 fL      MCH 19.7 pg      MCHC 27.3 g/dL      RDW 15.9 %      RDW-SD 40.9 fl      MPV 11.7 fL      Platelets 444 10*3/mm3      Neutrophil % 67.6 %      Lymphocyte % 20.8 %      Monocyte % 8.5 %      Eosinophil % 1.9 %      Basophil % 0.7 %      Neutrophils, Absolute 10.62 10*3/mm3      Lymphocytes, Absolute 3.27 10*3/mm3      Monocytes, Absolute 1.34 10*3/mm3      Eosinophils, Absolute 0.30 10*3/mm3      Basophils, Absolute 0.11 10*3/mm3     POC Glucose Once [[id number]]  (Abnormal) Collected: 06/07/23 1955    Specimen: Blood Updated: 06/07/23 2011     Glucose 266 mg/dL      Comment: : [id number] Chen Maddi  Julio C ID: [id number]       Blood Culture - Blood, Arm, Left [[id number]]  (Normal) Collected: 06/05/23 1920    Specimen: Blood from Arm, Left Updated: 06/07/23 1945     Blood Culture No growth at 2 days    POC Glucose Once [[id number]]  (Abnormal) Collected: 06/07/23 1646    Specimen: Blood Updated: 06/07/23 1657     Glucose 190 mg/dL      Comment: : [id number] Mcdonough AdrianaMeter ID: [id number]       POC Glucose Once [[id number]]  (Abnormal) Collected: 06/07/23 1145    Specimen: Blood Updated: 06/07/23 1155     Glucose 188 mg/dL      Comment: : [id number] Mcdonough AdrianaMeter ID: [id number]       C-reactive Protein [[id number]]  (Normal) Collected: 06/07/23 0519    Specimen: Blood Updated: 06/07/23 1120     C-Reactive Protein 0.41 mg/dL     Sedimentation Rate [[id number]]  (Abnormal) Collected: 06/07/23 0802    Specimen: Blood  Updated: 06/07/23 1112     Sed Rate 35 mm/hr     CBC & Differential [[id number]]  (Abnormal) Collected: 06/07/23 0802    Specimen: Blood Updated: 06/07/23 0836    Narrative:      The following orders were created for panel order CBC & Differential.  Procedure                               Abnormality         Status                     ---------                               -----------         ------                     CBC Auto Differential[[id number]]        Abnormal            Final result                 Please view results for these tests on the individual orders.    CBC Auto Differential [[id number]]  (Abnormal) Collected: 06/07/23 0802    Specimen: Blood Updated: 06/07/23 0836     WBC 17.05 10*3/mm3      RBC 4.48 10*6/mm3      Hemoglobin 8.7 g/dL      Hematocrit 32.5 %      MCV 72.5 fL      MCH 19.4 pg      MCHC 26.8 g/dL      RDW 16.0 %      RDW-SD 41.1 fl      MPV 11.3 fL      Platelets 481 10*3/mm3      Neutrophil % 73.7 %      Lymphocyte % 16.9 %      Monocyte % 6.5 %      Eosinophil % 1.6 %      Basophil % 0.8 %      Neutrophils, Absolute 12.59 10*3/mm3      Lymphocytes, Absolute 2.88 10*3/mm3      Monocytes, Absolute 1.10 10*3/mm3      Eosinophils, Absolute 0.27 10*3/mm3      Basophils, Absolute 0.13 10*3/mm3     POC Glucose Once [[id number]]  (Abnormal) Collected: 06/07/23 0737    Specimen: Blood Updated: 06/07/23 0747     Glucose 180 mg/dL      Comment: : [id number] Mcdonough AdrianaMeter ID: [id number]       Basic Metabolic Panel [[id number]]  (Abnormal) Collected: 06/07/23 0519    Specimen: Blood Updated: 06/07/23 0606     Glucose 187 mg/dL      BUN 16 mg/dL      Creatinine 0.68 mg/dL      Sodium 138 mmol/L      Potassium 3.5 mmol/L      Chloride 99 mmol/L      CO2 27.0 mmol/L      Calcium 9.5 mg/dL      BUN/Creatinine Ratio 23.5     Anion Gap 12.0 mmol/L      eGFR 96.2 mL/min/1.73     Narrative:      GFR Normal >60  Chronic Kidney Disease <60  Kidney Failure <15      POC Glucose Once [[id number]]   (Abnormal) Collected: 06/06/23 2000    Specimen: Blood Updated: 06/06/23 2010     Glucose 231 mg/dL      Comment: : [id number] Jeannette ArizmendieshekaMeter ID: [id number]       POC Glucose Once [[id number]]  (Abnormal) Collected: 06/06/23 1620    Specimen: Blood Updated: 06/06/23 1631     Glucose 213 mg/dL      Comment: : [id number] Juan Pablo EmmaMeter ID: [id number]       BNP [[id number]]  (Abnormal) Collected: 06/06/23 0620    Specimen: Blood Updated: 06/06/23 1509     proBNP 1,556.0 pg/mL     Narrative:      Among patients with dyspnea, NT-proBNP is highly sensitive for the detection of acute congestive heart failure. In addition NT-proBNP of <300 pg/ml effectively rules out acute congestive heart failure with 99% negative predictive value.    Results may be falsely decreased if patient taking Biotin.      Respiratory Panel PCR w/COVID-19(SARS-CoV-2) DIVINA/CRUZ/ZIGGY/PAD/COR/MAD/EMERALD In-House, NP Swab in UTM/[facility], 3-4 HR TAT - Swab, Nasopharynx [[id number]]  (Normal) Collected: 06/06/23 1240    Specimen: Swab from Nasopharynx Updated: 06/06/23 1335     ADENOVIRUS, PCR Not Detected     Coronavirus 229E Not Detected     Coronavirus HKU1 Not Detected     Coronavirus NL63 Not Detected     Coronavirus OC43 Not Detected     COVID19 Not Detected     Human Metapneumovirus Not Detected     Human Rhinovirus/Enterovirus Not Detected     Influenza A PCR Not Detected     Influenza B PCR Not Detected     Parainfluenza Virus 1 Not Detected     Parainfluenza Virus 2 Not Detected     Parainfluenza Virus 3 Not Detected     Parainfluenza Virus 4 Not Detected     RSV, PCR Not Detected     Bordetella pertussis pcr Not Detected     Bordetella parapertussis PCR Not Detected     Chlamydophila pneumoniae PCR Not Detected     Mycoplasma pneumo by PCR Not Detected    Narrative:      In the setting of a positive respiratory panel with a viral infection PLUS a negative procalcitonin without other underlying concern for bacterial infection, consider  observing off antibiotics or discontinuation of antibiotics and continue supportive care. If the respiratory panel is positive for atypical bacterial infection (Bordetella pertussis, Chlamydophila pneumoniae, or Mycoplasma pneumoniae), consider antibiotic de-escalation to target atypical bacterial infection.    Urinalysis, Microscopic Only - Urine, Clean Catch [[id number]]  (Abnormal) Collected: 06/06/23 1239    Specimen: Urine, Clean Catch Updated: 06/06/23 1301     RBC, UA None Seen /HPF      WBC, UA 0-2 /HPF      Bacteria, UA None Seen /HPF      Squamous Epithelial Cells, UA None Seen /HPF      Hyaline Casts, UA None Seen /LPF      Methodology Automated Microscopy    Urinalysis With Microscopic If Indicated (No Culture) - Urine, Clean Catch [[id number]]  (Abnormal) Collected: 06/06/23 1239    Specimen: Urine, Clean Catch Updated: 06/06/23 1301     Color, UA Yellow     Appearance, UA Clear     pH, UA 6.5     Specific Gravity, UA <=1.005     Glucose,  mg/dL (Trace)     Ketones, UA Negative     Bilirubin, UA Negative     Blood, UA Negative     Protein, UA Negative     Leuk Esterase, UA Trace     Nitrite, UA Negative     Urobilinogen, UA 0.2 E.U./dL    CBC (No Diff) [[id number]]  (Abnormal) Collected: 06/06/23 1206    Specimen: Blood Updated: 06/06/23 1255     WBC 14.69 10*3/mm3      RBC 4.18 10*6/mm3      Hemoglobin 8.2 g/dL      Hematocrit 29.9 %      MCV 71.5 fL      MCH 19.6 pg      MCHC 27.4 g/dL      RDW 15.8 %      RDW-SD 40.2 fl      MPV 11.6 fL      Platelets 483 10*3/mm3     POC Glucose Once [[id number]]  (Abnormal) Collected: 06/06/23 1153    Specimen: Blood Updated: 06/06/23 1204     Glucose 221 mg/dL      Comment: : [id number] Hilliard ID: [id number]       Procalcitonin [[id number]]  (Normal) Collected: 06/06/23 0620    Specimen: Blood Updated: 06/06/23 1201     Procalcitonin 0.02 ng/mL     Narrative:      As a Marker for Sepsis (Non-Neonates):    1. <0.5 ng/mL represents a low risk of  "severe sepsis and/or septic shock.  2. >2 ng/mL represents a high risk of severe sepsis and/or septic shock.    As a Marker for Lower Respiratory Tract Infections that require antibiotic therapy:    PCT on Admission    Antibiotic Therapy       6-12 Hrs later    >0.5                Strongly Recommended  >0.25 - <0.5        Recommended   0.1 - 0.25          Discouraged              Remeasure/reassess PCT  <0.1                Strongly Discouraged     Remeasure/reassess PCT    As 28 day mortality risk marker: \"Change in Procalcitonin Result\" (>80% or <=80%) if Day 0 (or Day 1) and Day 4 values are available. Refer to http://www.[facility]-pct-calculator.com    Change in PCT <=80%  A decrease of PCT levels below or equal to 80% defines a positive change in PCT test result representing a higher risk for 28-day all-cause mortality of patients diagnosed with severe sepsis for septic shock.    Change in PCT >80%  A decrease of PCT levels of more than 80% defines a negative change in PCT result representing a lower risk for 28-day all-cause mortality of patients diagnosed with severe sepsis or septic shock.       POC Glucose Once [[id number]]  (Abnormal) Collected: 06/06/23 0802    Specimen: Blood Updated: 06/06/23 0814     Glucose 232 mg/dL      Comment: : [id number] Juan Pablo [facility] ID: [id number]       Basic Metabolic Panel [[id number]]  (Abnormal) Collected: 06/06/23 0620    Specimen: Blood Updated: 06/06/23 0656     Glucose 249 mg/dL      BUN 9 mg/dL      Creatinine 0.56 mg/dL      Sodium 137 mmol/L      Potassium 3.5 mmol/L      Chloride 100 mmol/L      CO2 27.0 mmol/L      Calcium 9.0 mg/dL      BUN/Creatinine Ratio 16.1     Anion Gap 10.0 mmol/L      eGFR 100.8 mL/min/1.73     Narrative:      GFR Normal >60  Chronic Kidney Disease <60  Kidney Failure <15      POC Glucose Once [[id number]]  (Abnormal) Collected: 06/05/23 2015    Specimen: Blood Updated: 06/05/23 2026     Glucose 212 mg/dL      Comment: : [id number] " Noble Bowling ID: [id number]       Basic Metabolic Panel [[id number]]  (Abnormal) Collected: 06/05/23 1707    Specimen: Blood Updated: 06/05/23 1824     Glucose 198 mg/dL      BUN 9 mg/dL      Creatinine 0.53 mg/dL      Sodium 137 mmol/L      Potassium 3.8 mmol/L      Chloride 100 mmol/L      CO2 26.0 mmol/L      Calcium 8.7 mg/dL      BUN/Creatinine Ratio 17.0     Anion Gap 11.0 mmol/L      eGFR 102.1 mL/min/1.73     Narrative:      GFR Normal >60  Chronic Kidney Disease <60  Kidney Failure <15      CBC & Differential [[id number]]  (Abnormal) Collected: 06/05/23 1707    Specimen: Blood Updated: 06/05/23 1757    Narrative:      The following orders were created for panel order CBC & Differential.  Procedure                               Abnormality         Status                     ---------                               -----------         ------                     CBC Auto Differential[[id number]]        Abnormal            Final result                 Please view results for these tests on the individual orders.    CBC Auto Differential [[id number]]  (Abnormal) Collected: 06/05/23 1707    Specimen: Blood Updated: 06/05/23 1757     WBC 13.93 10*3/mm3      RBC 4.18 10*6/mm3      Hemoglobin 8.2 g/dL      Hematocrit 31.1 %      MCV 74.4 fL      MCH 19.6 pg      MCHC 26.4 g/dL      RDW 15.6 %      RDW-SD 41.5 fl      MPV 11.5 fL      Platelets 456 10*3/mm3      Neutrophil % 71.2 %      Lymphocyte % 20.0 %      Monocyte % 5.9 %      Eosinophil % 1.7 %      Basophil % 0.8 %      Neutrophils, Absolute 9.93 10*3/mm3      Lymphocytes, Absolute 2.79 10*3/mm3      Monocytes, Absolute 0.82 10*3/mm3      Eosinophils, Absolute 0.23 10*3/mm3      Basophils, Absolute 0.11 10*3/mm3             Ejection Fraction  No results found for: EF    Echo EF Estimated  No results found for: ECHOEFEST    Nuclear Stress Ejection Fraction  No components found for: NUCEF    Cath Ejection Fraction Quantitative  No results found for:  CATHEF    Condition on Discharge:  stable    Vital Signs  Temp:  [97.9 °F (36.6 °C)-98.8 °F (37.1 °C)] 98.8 °F (37.1 °C)  Heart Rate:  [73-87] 87  Resp:  [16-18] 18  BP: (145-158)/(71-90) 153/90    Physical Exam:  Vitals reviewed.   Constitutional:       General: Not in acute distress.     Appearance: Healthy appearance. Well-developed. Not diaphoretic.   Eyes:      General: No scleral icterus.     Conjunctiva/sclera: Conjunctivae normal.      Pupils: Pupils are equal, round, and reactive to light.   HENT:      Head: Normocephalic.    Mouth/Throat:      Pharynx: No oropharyngeal exudate.   Neck:      Vascular: No JVR.   Pulmonary:      Effort: Pulmonary effort is normal. No respiratory distress.      Breath sounds: Normal breath sounds. No wheezing. No rhonchi. No rales.   Chest:      Chest wall: Not tender to palpatation.   Cardiovascular:      Normal rate. Regular rhythm.   Pulses:     Intact distal pulses.   Edema:     Peripheral edema present.     Pretibial: bilateral 1+ non-pitting edema of the pretibial area.     Ankle: bilateral 1+ non-pitting edema of the ankle.     Feet: bilateral 1+ non-pitting edema of the feet.  Abdominal:      General: Bowel sounds are normal. There is no distension.      Palpations: Abdomen is soft.      Tenderness: There is no abdominal tenderness.   Musculoskeletal: Normal range of motion.      Cervical back: Normal range of motion and neck supple. Skin:     General: Skin is warm and dry.      Coloration: Skin is not pale.      Findings: No erythema or rash.   Neurological:      Mental Status: Alert, oriented to person, place, and time and oriented to person, place and time.      Deep Tendon Reflexes: Reflexes are normal and symmetric.   Psychiatric:         Behavior: Behavior normal.       Discharge Disposition  Home or Self Care    Discharge Medications     Discharge Medications        New Medications        Instructions Start Date   empagliflozin 10 MG tablet tablet  Commonly  known as: JARDIANCE   10 mg, Oral, Daily   Start Date: June 9, 2023     losartan 25 MG tablet  Commonly known as: COZAAR   25 mg, Oral, Every 24 Hours Scheduled   Start Date: June 9, 2023            Changes to Medications        Instructions Start Date   cloNIDine 0.1 MG tablet  Commonly known as: CATAPRES  What changed:   when to take this  reasons to take this   0.1 mg, Oral, 3 Times Daily PRN      furosemide 40 MG tablet  Commonly known as: LASIX  What changed:   medication strength  how much to take   40 mg, Oral, Daily PRN             Continue These Medications        Instructions Start Date   atorvastatin 20 MG tablet  Commonly known as: LIPITOR   20 mg, Oral, Daily      diclofenac 75 MG EC tablet  Commonly known as: VOLTAREN   75 mg, Oral, Daily      DULoxetine 30 MG capsule  Commonly known as: CYMBALTA   30 mg, Oral, Daily      glipizide 10 MG tablet  Commonly known as: GLUCOTROL   10 mg, Oral, 2 Times Daily Before Meals      metoprolol succinate XL 50 MG 24 hr tablet  Commonly known as: TOPROL-XL   50 mg, Oral, Daily, Replaces metoprolol tartrate      montelukast 10 MG tablet  Commonly known as: SINGULAIR   10 mg, Oral, Daily      nystatin 234878 UNIT/GM powder  Commonly known as: MYCOSTATIN   1 application, Topical, 4 Times Daily PRN      omeprazole 20 MG capsule  Commonly known as: priLOSEC   20 mg, Oral, Daily      rivaroxaban 20 MG tablet  Commonly known as: XARELTO   20 mg, Oral, Nightly      TiZANidine 4 MG capsule  Commonly known as: ZANAFLEX   4 mg, Oral, 3 Times Daily PRN, Usually takes twice a day      traMADol 50 MG tablet  Commonly known as: ULTRAM   50 mg, Oral, 3 Times Daily      Trulicity 0.75 MG/0.5ML solution pen-injector  Generic drug: Dulaglutide   0.75 mg, Subcutaneous, Weekly      vitamin D 1.25 MG (35788 UT) capsule capsule  Commonly known as: ERGOCALCIFEROL   50,000 Units, Oral, 2 Times Weekly      zolpidem 10 MG tablet  Commonly known as: AMBIEN   10 mg, Oral, Every Night at Bedtime,  EVERY NIGHT               Discharge Diet: cardiac, low salt    Activity at Discharge: resume normal activity    Follow-up Appointments  Future Appointments   Date Time Provider Department Center   6/15/2023  2:00 PM Nancy Sethi APRN [facility]         Test Results Pending at Discharge  Pending Labs       Order Current Status    Blood Culture - Blood, Arm, Left Preliminary result          Plan:  -d/c home today   -follow up with PCP in 1 week for continued WBC monitoring.   -Keep follow up with primary cardiology team, GUILHERME Wang on 6/15/23  -previously on Entresto which was stopped prior to admission due to symptomatic hypotension. Will d/c home on Losartan 25mg daily. Educated if SBP is consistently 140 or greater then she is to increase her losartan to 50mg daily.   -Will increase PRN lasix to 40mg daily.   -was started on Jardiance for CHF and has tolerated, will continue at d/c   -recommend she avoid use of Clonidine and Nifedipine until she has her follow up with GUILHERME Wang.        Electronically signed by GUILHERME Merino, 06/08/23, 9:12 AM CDT.     Time spent on discharge: 35 minutes

## 2023-06-09 ENCOUNTER — TRANSITIONAL CARE MANAGEMENT TELEPHONE ENCOUNTER (OUTPATIENT)
Dept: CALL CENTER | Facility: HOSPITAL | Age: 68
End: 2023-06-09
Payer: COMMERCIAL

## 2023-06-09 NOTE — OUTREACH NOTE
Call Center TCM Note    Flowsheet Row Responses   [facility] patient discharged from? [place]   Does the patient have one of the following disease processes/diagnoses(primary or secondary)? CHF   TCM attempt successful? No   Unsuccessful attempts Attempt 1   Revoked Reason Other  [Not a [facility] PCP]           Bradley Medina RN    6/9/2023, 15:03 CDT

## 2023-06-09 NOTE — PAYOR COMM NOTE
"REF:  [id number]     [facility]  JOSUÉ,CM  [phone]  OR  FAX   [phone]      Liz Garcias (68 y.o. Female)       Date of Birth   1955    Social Security Number       Address   [address] [place] [address]    Home Phone   [phone]    MRN   [id number]       Sabianism   [facility]    Marital Status                               Admission Date   6/5/23    Admission Type   Urgent    Admitting Provider   Jake Hernandez DO    Attending Provider       Department, Room/Bed   [facility] 4B, 451/1       Discharge Date   6/8/2023    Discharge Disposition   Home or Self Care    Discharge Destination                                 Attending Provider: (none)   Allergies: Cephalexin, Cheese, Codeine, Levaquin [Levofloxacin], Morphine, Naproxen, Penicillins, Aspirin, Cetirizine, Ciprofloxacin, Entresto [Sacubitril-valsartan], Gabapentin, Keppra [Levetiracetam], Lisinopril, Meloxicam, Vancomycin, Sulfa Antibiotics    Isolation: None   Infection: COVID (rule out) (06/06/23)   Code Status: Prior    Ht: 160 cm (63\")   Wt: 98.2 kg (216 lb 6.4 oz)    Admission Cmt: None   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]                   Active Insurance as of 6/5/2023       Primary Coverage       Payor Plan Insurance Group Employer/Plan Group    [facility] [place] [facility] [place]        Payor Plan Address Payor Plan Phone Number Payor Plan Fax Number Effective Dates    [address]   7/1/2018 - None Entered    [place] [phone]         Subscriber Name Subscriber Birth Date Member ID       LIZ GARCIAS 1955 [id number]               Secondary Coverage       Payor Plan Insurance Group Employer/Plan Group    [facility] MEDICARE REPLACEMENT [facility] DUAL [place]PLETE [id number]       Payor Plan Address Payor Plan Phone Number Payor Plan Fax Number Effective Dates    [address] [phone]  4/1/2023 - None " [facility] [address]         Subscriber Name Subscriber Birth Date Member ID       LIZ MANZANARES 1955 [id number]                     Emergency Contacts        (Rel.) Home Phone Work Phone Mobile Phone    bertram bull (Son) [phone] -- --    MORALES VALDEZ (Daughter) [phone] -- --    Joanne Edmonds -- -- [phone]    brian blul (Son) -- -- [phone]    doron easton (Other) -- -- [phone]                 Discharge Summary        Yamilex Vega APRN at 06/08/23 0912       Attestation signed by Jake Hernandez DO at 06/08/23 1210    I have reviewed this documentation and agree.                  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  Patient follows with Nancy Sethi for her primary cardiology care.  She is followed for nonischemic cardiomyopathy with chronic systolic congestive heart failure as well as hypertension.  Recently the patient had echocardiogram which revealed recovery of her EF to 60 to 65% when it was down to 40% 2021.  However on this echocardiogram it was noted that she had a moderate thickening of the left coronary cusp of the aortic valve and that vegetation could be a concern.  When she was contacted by Nancy the patient complained of low blood pressures and fevers.  Given this it was decided the patient necessitated admission for further work-up for potential endocarditis.     Upon my examination the patient is sitting up comfortably in the bed.  She says she has had some worsening of her fatigue and  "shortness of breath over the last 2 to 3 weeks.  She says she has developed a fever as well.  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     She denies any chest pain, palpitations, presyncope, or syncope.    Hospital Course  A EVAN was completed on 6/5 to rule out aortic valve vegetation with results noting no evidence of an aortic valve mass or aortic valve abscess, normal LVEF, moderate calcification of the aortic valve with trace of AI and mild MR with multiple jets noted. She has been fever free since admission however her WBC have remained elevated. Procalcitonin was obtained and noted to be normal. Blood culture, UA, and respiratory panel were also noted to be normal. Hospitalist was consulted for further assistance of leukocytosis which was felt to be secondary to acute stress response. WBC this morning has decreased to 15 from 17 yesterday. Her BP has remained borderline elevated throughout her stay. She has been started on Losartan and Jardiance in attempts to optimize CHF medications and has tolerated well. She has received 1x IV lasix 40mg and has been placed on 40mg PO Lasix daily and diuresed a total of 12.9L since her stay however appears to be up 8lbs since admission. Spoke with Dr. Acevedo about her drop in WBC and he agrees that she is felt safe for discharge home today.     Procedures Performed         Review of Systems   Constitutional:  Negative for diaphoresis, fatigue and unexpected weight change.   Respiratory:  Negative for chest tightness, shortness of breath and wheezing.    Cardiovascular:  Negative for chest pain, palpitations and leg swelling.   Gastrointestinal:  Negative for diarrhea, nausea and vomiting.   Neurological:  Negative for dizziness, syncope and light-headedness.   All other systems reviewed and are negative.    Consults:   Consults       Date and Time " Order Name Status Description    6/7/2023 10:00 AM Inpatient Hospitalist Consult              Pertinent Test Results:  Lab Results (last 72 hours)       Procedure Component Value Units Date/Time    POC Glucose Once [[id number]]  (Abnormal) Collected: 06/08/23 0737    Specimen: Blood Updated: 06/08/23 0749     Glucose 164 mg/dL      Comment: : [id number] Heidi Ghosh ID: [id number]       CBC & Differential [[id number]]  (Abnormal) Collected: 06/08/23 0338    Specimen: Blood Updated: 06/08/23 0451    Narrative:      The following orders were created for panel order CBC & Differential.  Procedure                               Abnormality         Status                     ---------                               -----------         ------                     CBC Auto Differential[[id number]]        Abnormal            Final result                 Please view results for these tests on the individual orders.    CBC Auto Differential [[id number]]  (Abnormal) Collected: 06/08/23 0338    Specimen: Blood Updated: 06/08/23 0451     WBC 15.72 10*3/mm3      RBC 4.12 10*6/mm3      Hemoglobin 8.1 g/dL      Hematocrit 29.7 %      MCV 72.1 fL      MCH 19.7 pg      MCHC 27.3 g/dL      RDW 15.9 %      RDW-SD 40.9 fl      MPV 11.7 fL      Platelets 444 10*3/mm3      Neutrophil % 67.6 %      Lymphocyte % 20.8 %      Monocyte % 8.5 %      Eosinophil % 1.9 %      Basophil % 0.7 %      Neutrophils, Absolute 10.62 10*3/mm3      Lymphocytes, Absolute 3.27 10*3/mm3      Monocytes, Absolute 1.34 10*3/mm3      Eosinophils, Absolute 0.30 10*3/mm3      Basophils, Absolute 0.11 10*3/mm3     POC Glucose Once [[id number]]  (Abnormal) Collected: 06/07/23 1955    Specimen: Blood Updated: 06/07/23 2011     Glucose 266 mg/dL      Comment: : [id number] Chen Bunch ID: [id number]       Blood Culture - Blood, Arm, Left [[id number]]  (Normal) Collected: 06/05/23 1920    Specimen: Blood from Arm, Left Updated: 06/07/23 1945     Blood  Culture No growth at 2 days    POC Glucose Once [[id number]]  (Abnormal) Collected: 06/07/23 1646    Specimen: Blood Updated: 06/07/23 1657     Glucose 190 mg/dL      Comment: : [id number] Mcdonough AdrianaMeter ID: [id number]       POC Glucose Once [[id number]]  (Abnormal) Collected: 06/07/23 1145    Specimen: Blood Updated: 06/07/23 1155     Glucose 188 mg/dL      Comment: : [id number] Mcdonough AdrianaMeter ID: [id number]       C-reactive Protein [[id number]]  (Normal) Collected: 06/07/23 0519    Specimen: Blood Updated: 06/07/23 1120     C-Reactive Protein 0.41 mg/dL     Sedimentation Rate [[id number]]  (Abnormal) Collected: 06/07/23 0802    Specimen: Blood Updated: 06/07/23 1112     Sed Rate 35 mm/hr     CBC & Differential [[id number]]  (Abnormal) Collected: 06/07/23 0802    Specimen: Blood Updated: 06/07/23 0836    Narrative:      The following orders were created for panel order CBC & Differential.  Procedure                               Abnormality         Status                     ---------                               -----------         ------                     CBC Auto Differential[[id number]]        Abnormal            Final result                 Please view results for these tests on the individual orders.    CBC Auto Differential [[id number]]  (Abnormal) Collected: 06/07/23 0802    Specimen: Blood Updated: 06/07/23 0836     WBC 17.05 10*3/mm3      RBC 4.48 10*6/mm3      Hemoglobin 8.7 g/dL      Hematocrit 32.5 %      MCV 72.5 fL      MCH 19.4 pg      MCHC 26.8 g/dL      RDW 16.0 %      RDW-SD 41.1 fl      MPV 11.3 fL      Platelets 481 10*3/mm3      Neutrophil % 73.7 %      Lymphocyte % 16.9 %      Monocyte % 6.5 %      Eosinophil % 1.6 %      Basophil % 0.8 %      Neutrophils, Absolute 12.59 10*3/mm3      Lymphocytes, Absolute 2.88 10*3/mm3      Monocytes, Absolute 1.10 10*3/mm3      Eosinophils, Absolute 0.27 10*3/mm3      Basophils, Absolute 0.13 10*3/mm3     POC Glucose Once [[id number]]  (Abnormal)  Collected: 06/07/23 0737    Specimen: Blood Updated: 06/07/23 0747     Glucose 180 mg/dL      Comment: : [id number] Sharonda AdrianaMeter ID: [id number]       Basic Metabolic Panel [[id number]]  (Abnormal) Collected: 06/07/23 0519    Specimen: Blood Updated: 06/07/23 0606     Glucose 187 mg/dL      BUN 16 mg/dL      Creatinine 0.68 mg/dL      Sodium 138 mmol/L      Potassium 3.5 mmol/L      Chloride 99 mmol/L      CO2 27.0 mmol/L      Calcium 9.5 mg/dL      BUN/Creatinine Ratio 23.5     Anion Gap 12.0 mmol/L      eGFR 96.2 mL/min/1.73     Narrative:      GFR Normal >60  Chronic Kidney Disease <60  Kidney Failure <15      POC Glucose Once [[id number]]  (Abnormal) Collected: 06/06/23 2000    Specimen: Blood Updated: 06/06/23 2010     Glucose 231 mg/dL      Comment: : [id number] Jeannette ArizmendieshekaMeter ID: [id number]       POC Glucose Once [[id number]]  (Abnormal) Collected: 06/06/23 1620    Specimen: Blood Updated: 06/06/23 1631     Glucose 213 mg/dL      Comment: : [id number] Juan Pablo EmmaMeter ID: [id number]       BNP [[id number]]  (Abnormal) Collected: 06/06/23 0620    Specimen: Blood Updated: 06/06/23 1509     proBNP 1,556.0 pg/mL     Narrative:      Among patients with dyspnea, NT-proBNP is highly sensitive for the detection of acute congestive heart failure. In addition NT-proBNP of <300 pg/ml effectively rules out acute congestive heart failure with 99% negative predictive value.    Results may be falsely decreased if patient taking Biotin.      Respiratory Panel PCR w/COVID-19(SARS-CoV-2) DIVINA/CRUZ/ZIGGY/PAD/COR/MAD/EMERALD In-House, NP Swab in UTM/VTM, 3-4 HR TAT - Swab, Nasopharynx [[id number]]  (Normal) Collected: 06/06/23 1240    Specimen: Swab from Nasopharynx Updated: 06/06/23 1335     ADENOVIRUS, PCR Not Detected     Coronavirus 229E Not Detected     Coronavirus HKU1 Not Detected     Coronavirus NL63 Not Detected     Coronavirus OC43 Not Detected     COVID19 Not Detected     Human Metapneumovirus Not Detected      Human Rhinovirus/Enterovirus Not Detected     Influenza A PCR Not Detected     Influenza B PCR Not Detected     Parainfluenza Virus 1 Not Detected     Parainfluenza Virus 2 Not Detected     Parainfluenza Virus 3 Not Detected     Parainfluenza Virus 4 Not Detected     RSV, PCR Not Detected     Bordetella pertussis pcr Not Detected     Bordetella parapertussis PCR Not Detected     Chlamydophila pneumoniae PCR Not Detected     Mycoplasma pneumo by PCR Not Detected    Narrative:      In the setting of a positive respiratory panel with a viral infection PLUS a negative procalcitonin without other underlying concern for bacterial infection, consider observing off antibiotics or discontinuation of antibiotics and continue supportive care. If the respiratory panel is positive for atypical bacterial infection (Bordetella pertussis, Chlamydophila pneumoniae, or Mycoplasma pneumoniae), consider antibiotic de-escalation to target atypical bacterial infection.    Urinalysis, Microscopic Only - Urine, Clean Catch [[id number]]  (Abnormal) Collected: 06/06/23 1239    Specimen: Urine, Clean Catch Updated: 06/06/23 1301     RBC, UA None Seen /HPF      WBC, UA 0-2 /HPF      Bacteria, UA None Seen /HPF      Squamous Epithelial Cells, UA None Seen /HPF      Hyaline Casts, UA None Seen /LPF      Methodology Automated Microscopy    Urinalysis With Microscopic If Indicated (No Culture) - Urine, Clean Catch [[id number]]  (Abnormal) Collected: 06/06/23 1239    Specimen: Urine, Clean Catch Updated: 06/06/23 1301     Color, UA Yellow     Appearance, UA Clear     pH, UA 6.5     Specific Gravity, UA <=1.005     Glucose,  mg/dL (Trace)     Ketones, UA Negative     Bilirubin, UA Negative     Blood, UA Negative     Protein, UA Negative     Leuk Esterase, UA Trace     Nitrite, UA Negative     Urobilinogen, UA 0.2 E.U./dL    CBC (No Diff) [[id number]]  (Abnormal) Collected: 06/06/23 1206    Specimen: Blood Updated: 06/06/23 1255     WBC 14.69  "10*3/mm3      RBC 4.18 10*6/mm3      Hemoglobin 8.2 g/dL      Hematocrit 29.9 %      MCV 71.5 fL      MCH 19.6 pg      MCHC 27.4 g/dL      RDW 15.8 %      RDW-SD 40.2 fl      MPV 11.6 fL      Platelets 483 10*3/mm3     POC Glucose Once [[id number]]  (Abnormal) Collected: 06/06/23 1153    Specimen: Blood Updated: 06/06/23 1204     Glucose 221 mg/dL      Comment: : [id number] Juan Pablo Huaqi Information Digitalandrew ID: [id number]       Procalcitonin [[id number]]  (Normal) Collected: 06/06/23 0620    Specimen: Blood Updated: 06/06/23 1201     Procalcitonin 0.02 ng/mL     Narrative:      As a Marker for Sepsis (Non-Neonates):    1. <0.5 ng/mL represents a low risk of severe sepsis and/or septic shock.  2. >2 ng/mL represents a high risk of severe sepsis and/or septic shock.    As a Marker for Lower Respiratory Tract Infections that require antibiotic therapy:    PCT on Admission    Antibiotic Therapy       6-12 Hrs later    >0.5                Strongly Recommended  >0.25 - <0.5        Recommended   0.1 - 0.25          Discouraged              Remeasure/reassess PCT  <0.1                Strongly Discouraged     Remeasure/reassess PCT    As 28 day mortality risk marker: \"Change in Procalcitonin Result\" (>80% or <=80%) if Day 0 (or Day 1) and Day 4 values are available. Refer to http://www.[facility]-pct-calculator.com    Change in PCT <=80%  A decrease of PCT levels below or equal to 80% defines a positive change in PCT test result representing a higher risk for 28-day all-cause mortality of patients diagnosed with severe sepsis for septic shock.    Change in PCT >80%  A decrease of PCT levels of more than 80% defines a negative change in PCT result representing a lower risk for 28-day all-cause mortality of patients diagnosed with severe sepsis or septic shock.       POC Glucose Once [[id number]]  (Abnormal) Collected: 06/06/23 0802    Specimen: Blood Updated: 06/06/23 0814     Glucose 232 mg/dL      Comment: : [id number] Juan Pablo Hilliard " ID: [id number]       Basic Metabolic Panel [[id number]]  (Abnormal) Collected: 06/06/23 0620    Specimen: Blood Updated: 06/06/23 0656     Glucose 249 mg/dL      BUN 9 mg/dL      Creatinine 0.56 mg/dL      Sodium 137 mmol/L      Potassium 3.5 mmol/L      Chloride 100 mmol/L      CO2 27.0 mmol/L      Calcium 9.0 mg/dL      BUN/Creatinine Ratio 16.1     Anion Gap 10.0 mmol/L      eGFR 100.8 mL/min/1.73     Narrative:      GFR Normal >60  Chronic Kidney Disease <60  Kidney Failure <15      POC Glucose Once [[id number]]  (Abnormal) Collected: 06/05/23 2015    Specimen: Blood Updated: 06/05/23 2026     Glucose 212 mg/dL      Comment: : [id number] Noble Bowling ID: [id number]       Basic Metabolic Panel [[id number]]  (Abnormal) Collected: 06/05/23 1707    Specimen: Blood Updated: 06/05/23 1824     Glucose 198 mg/dL      BUN 9 mg/dL      Creatinine 0.53 mg/dL      Sodium 137 mmol/L      Potassium 3.8 mmol/L      Chloride 100 mmol/L      CO2 26.0 mmol/L      Calcium 8.7 mg/dL      BUN/Creatinine Ratio 17.0     Anion Gap 11.0 mmol/L      eGFR 102.1 mL/min/1.73     Narrative:      GFR Normal >60  Chronic Kidney Disease <60  Kidney Failure <15      CBC & Differential [[id number]]  (Abnormal) Collected: 06/05/23 1707    Specimen: Blood Updated: 06/05/23 1757    Narrative:      The following orders were created for panel order CBC & Differential.  Procedure                               Abnormality         Status                     ---------                               -----------         ------                     CBC Auto Differential[[id number]]        Abnormal            Final result                 Please view results for these tests on the individual orders.    CBC Auto Differential [[id number]]  (Abnormal) Collected: 06/05/23 1707    Specimen: Blood Updated: 06/05/23 1757     WBC 13.93 10*3/mm3      RBC 4.18 10*6/mm3      Hemoglobin 8.2 g/dL      Hematocrit 31.1 %      MCV 74.4 fL      MCH 19.6 pg      MCHC 26.4  g/dL      RDW 15.6 %      RDW-SD 41.5 fl      MPV 11.5 fL      Platelets 456 10*3/mm3      Neutrophil % 71.2 %      Lymphocyte % 20.0 %      Monocyte % 5.9 %      Eosinophil % 1.7 %      Basophil % 0.8 %      Neutrophils, Absolute 9.93 10*3/mm3      Lymphocytes, Absolute 2.79 10*3/mm3      Monocytes, Absolute 0.82 10*3/mm3      Eosinophils, Absolute 0.23 10*3/mm3      Basophils, Absolute 0.11 10*3/mm3             Ejection Fraction  No results found for: EF    Echo EF Estimated  No results found for: ECHOEFEST    Nuclear Stress Ejection Fraction  No components found for: NUCEF    Cath Ejection Fraction Quantitative  No results found for: CATHEF    Condition on Discharge:  stable    Vital Signs  Temp:  [97.9 °F (36.6 °C)-98.8 °F (37.1 °C)] 98.8 °F (37.1 °C)  Heart Rate:  [73-87] 87  Resp:  [16-18] 18  BP: (145-158)/(71-90) 153/90    Physical Exam:  Vitals reviewed.   Constitutional:       General: Not in acute distress.     Appearance: Healthy appearance. Well-developed. Not diaphoretic.   Eyes:      General: No scleral icterus.     Conjunctiva/sclera: Conjunctivae normal.      Pupils: Pupils are equal, round, and reactive to light.   HENT:      Head: Normocephalic.    Mouth/Throat:      Pharynx: No oropharyngeal exudate.   Neck:      Vascular: No JVR.   Pulmonary:      Effort: Pulmonary effort is normal. No respiratory distress.      Breath sounds: Normal breath sounds. No wheezing. No rhonchi. No rales.   Chest:      Chest wall: Not tender to palpatation.   Cardiovascular:      Normal rate. Regular rhythm.   Pulses:     Intact distal pulses.   Edema:     Peripheral edema present.     Pretibial: bilateral 1+ non-pitting edema of the pretibial area.     Ankle: bilateral 1+ non-pitting edema of the ankle.     Feet: bilateral 1+ non-pitting edema of the feet.  Abdominal:      General: Bowel sounds are normal. There is no distension.      Palpations: Abdomen is soft.      Tenderness: There is no abdominal tenderness.    Musculoskeletal: Normal range of motion.      Cervical back: Normal range of motion and neck supple. Skin:     General: Skin is warm and dry.      Coloration: Skin is not pale.      Findings: No erythema or rash.   Neurological:      Mental Status: Alert, oriented to person, place, and time and oriented to person, place and time.      Deep Tendon Reflexes: Reflexes are normal and symmetric.   Psychiatric:         Behavior: Behavior normal.       Discharge Disposition  Home or Self Care    Discharge Medications     Discharge Medications        New Medications        Instructions Start Date   empagliflozin 10 MG tablet tablet  Commonly known as: JARDIANCE   10 mg, Oral, Daily   Start Date: June 9, 2023     losartan 25 MG tablet  Commonly known as: COZAAR   25 mg, Oral, Every 24 Hours Scheduled   Start Date: June 9, 2023            Changes to Medications        Instructions Start Date   cloNIDine 0.1 MG tablet  Commonly known as: CATAPRES  What changed:   when to take this  reasons to take this   0.1 mg, Oral, 3 Times Daily PRN      furosemide 40 MG tablet  Commonly known as: LASIX  What changed:   medication strength  how much to take   40 mg, Oral, Daily PRN             Continue These Medications        Instructions Start Date   atorvastatin 20 MG tablet  Commonly known as: LIPITOR   20 mg, Oral, Daily      diclofenac 75 MG EC tablet  Commonly known as: VOLTAREN   75 mg, Oral, Daily      DULoxetine 30 MG capsule  Commonly known as: CYMBALTA   30 mg, Oral, Daily      glipizide 10 MG tablet  Commonly known as: GLUCOTROL   10 mg, Oral, 2 Times Daily Before Meals      metoprolol succinate XL 50 MG 24 hr tablet  Commonly known as: TOPROL-XL   50 mg, Oral, Daily, Replaces metoprolol tartrate      montelukast 10 MG tablet  Commonly known as: SINGULAIR   10 mg, Oral, Daily      nystatin 403879 UNIT/GM powder  Commonly known as: MYCOSTATIN   1 application, Topical, 4 Times Daily PRN      omeprazole 20 MG capsule  Commonly  known as: priLOSEC   20 mg, Oral, Daily      rivaroxaban 20 MG tablet  Commonly known as: XARELTO   20 mg, Oral, Nightly      TiZANidine 4 MG capsule  Commonly known as: ZANAFLEX   4 mg, Oral, 3 Times Daily PRN, Usually takes twice a day      traMADol 50 MG tablet  Commonly known as: ULTRAM   50 mg, Oral, 3 Times Daily      Trulicity 0.75 MG/0.5ML solution pen-injector  Generic drug: Dulaglutide   0.75 mg, Subcutaneous, Weekly      vitamin D 1.25 MG (03929 UT) capsule capsule  Commonly known as: ERGOCALCIFEROL   50,000 Units, Oral, 2 Times Weekly      zolpidem 10 MG tablet  Commonly known as: AMBIEN   10 mg, Oral, Every Night at Bedtime, EVERY NIGHT               Discharge Diet: cardiac, low salt    Activity at Discharge: resume normal activity    Follow-up Appointments  Future Appointments   Date Time Provider Department Center   6/15/2023  2:00 PM Nancy Sethi APRN [facility]         Test Results Pending at Discharge  Pending Labs       Order Current Status    Blood Culture - Blood, Arm, Left Preliminary result          Plan:  -d/c home today   -follow up with PCP in 1 week for continued WBC monitoring.   -Keep follow up with primary cardiology team, GUILHERME Wang on 6/15/23  -previously on Entresto which was stopped prior to admission due to symptomatic hypotension. Will d/c home on Losartan 25mg daily. Educated if SBP is consistently 140 or greater then she is to increase her losartan to 50mg daily.   -Will increase PRN lasix to 40mg daily.   -was started on Jardiance for CHF and has tolerated, will continue at d/c   -recommend she avoid use of Clonidine and Nifedipine until she has her follow up with GUILHERME Wang.        Electronically signed by GUILHERME Merino, 06/08/23, 9:12 AM CDT.     Time spent on discharge: 35 minutes              Electronically signed by Jake Hernandez DO at 06/08/23 1210       Discharge Order (From admission, onward)       Start     Ordered     06/08/23 0930  Discharge patient  Once        Expected Discharge Date: 06/08/23    Discharge Disposition: Home or Self Care    Physician of Record for Attribution - Please select from Treatment Team: LANRE CHAMPION [[id number]]    Review needed by CMO to determine Physician of Record: No       Question Answer Comment   Physician of Record for Attribution - Please select from Treatment Team LANRE CHAMPION    Review needed by CMO to determine Physician of Record No        06/08/23 1017

## 2023-06-10 LAB — BACTERIA SPEC AEROBE CULT: NORMAL

## 2023-06-19 ENCOUNTER — READMISSION MANAGEMENT (OUTPATIENT)
Dept: CALL CENTER | Facility: HOSPITAL | Age: 68
End: 2023-06-19
Payer: COMMERCIAL

## 2023-06-19 NOTE — OUTREACH NOTE
CHF Week 2 Survey      Flowsheet Row Responses   [facility] patient discharged from? [place]   Does the patient have one of the following disease processes/diagnoses(primary or secondary)? CHF   Week 2 attempt successful? Yes   Call start time 1642   Call end time 1644   Discharge diagnosis vegetation of heart valve, CHF   Meds reviewed with patient/caregiver? Yes   Is the patient having any side effects they believe may be caused by any medication additions or changes? No   Does the patient have all medications ordered at discharge? Yes   Is the patient taking all medications as directed (includes completed medication regime)? Yes   Has the patient kept scheduled appointments due by today? Yes   Psychosocial issues? No   Comments Monitoring BP at home,it has been WNL, she denies issues at this time other than fatigue.   What is the patient's perception of their health status since discharge? Improving   Is the patient/caregiver able to teach back the hierarchy of who to call/visit for symptoms/problems? PCP, Specialist, Home health nurse, Urgent Care, ED, 911 Yes   CHF Week 2 call completed? Yes   Revoked No further contact(revokes)-requires comment   Is the patient interested in additional calls from an ambulatory ?  NOTE:  applies to high risk patients requiring additional follow-up. No   Graduated/Revoked comments quick call   Call end time 1644            Moni DIXON - Registered Nurse

## 2023-08-01 ENCOUNTER — TELEPHONE (OUTPATIENT)
Dept: HEMATOLOGY | Age: 68
End: 2023-08-01

## 2023-08-15 DIAGNOSIS — I50.22 CHRONIC SYSTOLIC HEART FAILURE: ICD-10-CM

## 2023-08-15 RX ORDER — FUROSEMIDE 20 MG/1
20 TABLET ORAL DAILY PRN
Qty: 30 TABLET | Refills: 2 | Status: SHIPPED | OUTPATIENT
Start: 2023-08-15 | End: 2024-08-14

## 2023-08-15 NOTE — TELEPHONE ENCOUNTER
Provided an additional 3 month supply but needs to manjinder f/u for further refills or get from PCP. TX!

## 2023-08-16 ENCOUNTER — HOSPITAL ENCOUNTER (INPATIENT)
Age: 68
LOS: 8 days | Discharge: HOME HEALTH | DRG: 377 | End: 2023-08-24
Attending: EMERGENCY MEDICINE | Admitting: HOSPITALIST
Payer: MEDICARE

## 2023-08-16 ENCOUNTER — APPOINTMENT (OUTPATIENT)
Dept: CT IMAGING | Age: 68
DRG: 377 | End: 2023-08-16
Payer: MEDICARE

## 2023-08-16 DIAGNOSIS — D64.9 ACUTE ANEMIA: Primary | ICD-10-CM

## 2023-08-16 DIAGNOSIS — R29.6 FREQUENT FALLS: ICD-10-CM

## 2023-08-16 DIAGNOSIS — R55 NEAR SYNCOPE: ICD-10-CM

## 2023-08-16 PROBLEM — K92.2 LOWER GI BLEED: Status: ACTIVE | Noted: 2023-08-16

## 2023-08-16 PROBLEM — E87.1 HYPONATREMIA: Status: ACTIVE | Noted: 2023-08-16

## 2023-08-16 PROBLEM — R69 LIFE THREATENING MEDICAL ILLNESS: Status: ACTIVE | Noted: 2023-08-16

## 2023-08-16 PROBLEM — R70.1 RETICULOCYTOSIS: Status: ACTIVE | Noted: 2023-08-16

## 2023-08-16 PROBLEM — R73.9 HYPERGLYCEMIA: Status: ACTIVE | Noted: 2023-08-16

## 2023-08-16 PROBLEM — R79.1 SUPRATHERAPEUTIC INR: Status: ACTIVE | Noted: 2023-08-16

## 2023-08-16 LAB
ABO/RH: NORMAL
ACANTHOCYTES BLD QL SMEAR: ABNORMAL
ALBUMIN SERPL-MCNC: 3 G/DL (ref 3.5–5.2)
ALP SERPL-CCNC: 80 U/L (ref 35–104)
ALT SERPL-CCNC: 147 U/L (ref 5–33)
AMMONIA PLAS-SCNC: 23 UMOL/L (ref 11–51)
ANION GAP SERPL CALCULATED.3IONS-SCNC: 10 MMOL/L (ref 7–19)
ANION GAP SERPL CALCULATED.3IONS-SCNC: 12 MMOL/L (ref 7–19)
ANISOCYTOSIS BLD QL SMEAR: ABNORMAL
ANTIBODY SCREEN: NORMAL
APTT PPP: 51.4 SEC (ref 26–36.2)
AST SERPL-CCNC: 73 U/L (ref 5–32)
BASOPHILS # BLD: 0.1 K/UL (ref 0–0.2)
BASOPHILS NFR BLD: 0.5 % (ref 0–1)
BILIRUB SERPL-MCNC: 0.3 MG/DL (ref 0.2–1.2)
BLOOD BANK DISPENSE STATUS: NORMAL
BLOOD BANK DISPENSE STATUS: NORMAL
BLOOD BANK PRODUCT CODE: NORMAL
BLOOD BANK PRODUCT CODE: NORMAL
BNP BLD-MCNC: 4065 PG/ML (ref 0–124)
BPU ID: NORMAL
BPU ID: NORMAL
BUN SERPL-MCNC: 17 MG/DL (ref 8–23)
BUN SERPL-MCNC: 17 MG/DL (ref 8–23)
CALCIUM SERPL-MCNC: 8.1 MG/DL (ref 8.8–10.2)
CALCIUM SERPL-MCNC: 8.6 MG/DL (ref 8.8–10.2)
CHLORIDE SERPL-SCNC: 96 MMOL/L (ref 98–111)
CHLORIDE SERPL-SCNC: 98 MMOL/L (ref 98–111)
CO2 SERPL-SCNC: 21 MMOL/L (ref 22–29)
CO2 SERPL-SCNC: 22 MMOL/L (ref 22–29)
CREAT SERPL-MCNC: 0.8 MG/DL (ref 0.5–0.9)
CREAT SERPL-MCNC: 0.8 MG/DL (ref 0.5–0.9)
DESCRIPTION BLOOD BANK: NORMAL
DESCRIPTION BLOOD BANK: NORMAL
EKG P AXIS: -4 DEGREES
EKG P-R INTERVAL: 124 MS
EKG Q-T INTERVAL: 448 MS
EKG QRS DURATION: 136 MS
EKG QTC CALCULATION (BAZETT): 463 MS
EKG T AXIS: 73 DEGREES
EOSINOPHIL # BLD: 0.1 K/UL (ref 0–0.6)
EOSINOPHIL NFR BLD: 0.5 % (ref 0–5)
ERYTHROCYTE [DISTWIDTH] IN BLOOD BY AUTOMATED COUNT: 18.9 % (ref 11.5–14.5)
FERRITIN SERPL-MCNC: 8 NG/ML (ref 13–150)
FOLATE SERPL-MCNC: >20 NG/ML (ref 4.8–37.3)
GLUCOSE BLD-MCNC: 227 MG/DL (ref 70–99)
GLUCOSE BLD-MCNC: 249 MG/DL (ref 70–99)
GLUCOSE BLD-MCNC: 355 MG/DL (ref 70–99)
GLUCOSE BLD-MCNC: 398 MG/DL (ref 70–99)
GLUCOSE SERPL-MCNC: 416 MG/DL (ref 74–109)
GLUCOSE SERPL-MCNC: 447 MG/DL (ref 74–109)
HAPTOGLOB SERPL-MCNC: 194 MG/DL (ref 30–200)
HCT VFR BLD AUTO: 15.9 % (ref 37–47)
HCT VFR BLD AUTO: 18.4 % (ref 37–47)
HCT VFR BLD AUTO: 26 % (ref 37–47)
HEMOCCULT SP1 STL QL: NORMAL
HGB BLD-MCNC: 4 G/DL (ref 12–16)
HGB BLD-MCNC: 5 G/DL (ref 12–16)
HGB BLD-MCNC: 7.7 G/DL (ref 12–16)
HYPOCHROMIA BLD QL SMEAR: ABNORMAL
IMM GRANULOCYTES # BLD: 0.1 K/UL
INR PPP: 5.17 (ref 0.88–1.18)
IRON SATN MFR SERPL: 3 % (ref 14–50)
IRON SERPL-MCNC: 10 UG/DL (ref 37–145)
LACTATE BLDV-SCNC: 3.1 MMOL/L (ref 0.5–1.9)
LDH SERPL-CCNC: 303 U/L (ref 91–215)
LYMPHOCYTES # BLD: 2.1 K/UL (ref 1.1–4.5)
LYMPHOCYTES NFR BLD: 16.7 % (ref 20–40)
MCH RBC QN AUTO: 16.9 PG (ref 27–31)
MCHC RBC AUTO-ENTMCNC: 25.2 G/DL (ref 33–37)
MCV RBC AUTO: 67.1 FL (ref 81–99)
MICROCYTES BLD QL SMEAR: ABNORMAL
MONOCYTES # BLD: 1.2 K/UL (ref 0–0.9)
MONOCYTES NFR BLD: 9.4 % (ref 0–10)
NEUTROPHILS # BLD: 9.3 K/UL (ref 1.5–7.5)
NEUTS SEG NFR BLD: 72.5 % (ref 50–65)
OVALOCYTES BLD QL SMEAR: ABNORMAL
PERFORMED ON: ABNORMAL
PLATELET # BLD AUTO: 389 K/UL (ref 130–400)
PMV BLD AUTO: 12.2 FL (ref 9.4–12.3)
POIKILOCYTOSIS BLD QL SMEAR: ABNORMAL
POLYCHROMASIA BLD QL SMEAR: ABNORMAL
POTASSIUM SERPL-SCNC: 4.7 MMOL/L (ref 3.5–5)
POTASSIUM SERPL-SCNC: 4.8 MMOL/L (ref 3.5–5)
PROT SERPL-MCNC: 5.5 G/DL (ref 6.6–8.7)
PROTHROMBIN TIME: 46.5 SEC (ref 12–14.6)
RBC # BLD AUTO: 2.37 M/UL (ref 4.2–5.4)
RETICS # AUTO: 0.04 M/UL (ref 0.03–0.12)
RETICS/RBC NFR: 1.79 % (ref 0.5–1.5)
SODIUM SERPL-SCNC: 129 MMOL/L (ref 136–145)
SODIUM SERPL-SCNC: 130 MMOL/L (ref 136–145)
TIBC SERPL-MCNC: 362 UG/DL (ref 250–400)
VIT B12 SERPL-MCNC: 666 PG/ML (ref 211–946)
WBC # BLD AUTO: 12.8 K/UL (ref 4.8–10.8)

## 2023-08-16 PROCEDURE — 6360000002 HC RX W HCPCS: Performed by: HOSPITALIST

## 2023-08-16 PROCEDURE — 2580000003 HC RX 258: Performed by: HOSPITALIST

## 2023-08-16 PROCEDURE — 99223 1ST HOSP IP/OBS HIGH 75: CPT | Performed by: INTERNAL MEDICINE

## 2023-08-16 PROCEDURE — 6370000000 HC RX 637 (ALT 250 FOR IP): Performed by: INTERNAL MEDICINE

## 2023-08-16 PROCEDURE — 82962 GLUCOSE BLOOD TEST: CPT

## 2023-08-16 PROCEDURE — 82746 ASSAY OF FOLIC ACID SERUM: CPT

## 2023-08-16 PROCEDURE — C9113 INJ PANTOPRAZOLE SODIUM, VIA: HCPCS | Performed by: HOSPITALIST

## 2023-08-16 PROCEDURE — 85018 HEMOGLOBIN: CPT

## 2023-08-16 PROCEDURE — 86923 COMPATIBILITY TEST ELECTRIC: CPT

## 2023-08-16 PROCEDURE — 83010 ASSAY OF HAPTOGLOBIN QUANT: CPT

## 2023-08-16 PROCEDURE — 05H933Z INSERTION OF INFUSION DEVICE INTO RIGHT BRACHIAL VEIN, PERCUTANEOUS APPROACH: ICD-10-PCS | Performed by: HOSPITALIST

## 2023-08-16 PROCEDURE — 83880 ASSAY OF NATRIURETIC PEPTIDE: CPT

## 2023-08-16 PROCEDURE — 2580000003 HC RX 258: Performed by: INTERNAL MEDICINE

## 2023-08-16 PROCEDURE — 36430 TRANSFUSION BLD/BLD COMPNT: CPT

## 2023-08-16 PROCEDURE — 83540 ASSAY OF IRON: CPT

## 2023-08-16 PROCEDURE — 82728 ASSAY OF FERRITIN: CPT

## 2023-08-16 PROCEDURE — 2140000000 HC CCU INTERMEDIATE R&B

## 2023-08-16 PROCEDURE — 82270 OCCULT BLOOD FECES: CPT

## 2023-08-16 PROCEDURE — 6360000002 HC RX W HCPCS: Performed by: INTERNAL MEDICINE

## 2023-08-16 PROCEDURE — 83605 ASSAY OF LACTIC ACID: CPT

## 2023-08-16 PROCEDURE — 85045 AUTOMATED RETICULOCYTE COUNT: CPT

## 2023-08-16 PROCEDURE — P9016 RBC LEUKOCYTES REDUCED: HCPCS

## 2023-08-16 PROCEDURE — 6360000004 HC RX CONTRAST MEDICATION: Performed by: INTERNAL MEDICINE

## 2023-08-16 PROCEDURE — 93005 ELECTROCARDIOGRAM TRACING: CPT | Performed by: EMERGENCY MEDICINE

## 2023-08-16 PROCEDURE — 82140 ASSAY OF AMMONIA: CPT

## 2023-08-16 PROCEDURE — 85730 THROMBOPLASTIN TIME PARTIAL: CPT

## 2023-08-16 PROCEDURE — 82607 VITAMIN B-12: CPT

## 2023-08-16 PROCEDURE — 36415 COLL VENOUS BLD VENIPUNCTURE: CPT

## 2023-08-16 PROCEDURE — 74177 CT ABD & PELVIS W/CONTRAST: CPT

## 2023-08-16 PROCEDURE — 6370000000 HC RX 637 (ALT 250 FOR IP): Performed by: HOSPITALIST

## 2023-08-16 PROCEDURE — 2500000003 HC RX 250 WO HCPCS: Performed by: INTERNAL MEDICINE

## 2023-08-16 PROCEDURE — 70450 CT HEAD/BRAIN W/O DYE: CPT

## 2023-08-16 PROCEDURE — 85610 PROTHROMBIN TIME: CPT

## 2023-08-16 PROCEDURE — 93010 ELECTROCARDIOGRAM REPORT: CPT | Performed by: INTERNAL MEDICINE

## 2023-08-16 PROCEDURE — 80053 COMPREHEN METABOLIC PANEL: CPT

## 2023-08-16 PROCEDURE — 83550 IRON BINDING TEST: CPT

## 2023-08-16 PROCEDURE — 83615 LACTATE (LD) (LDH) ENZYME: CPT

## 2023-08-16 PROCEDURE — 85014 HEMATOCRIT: CPT

## 2023-08-16 PROCEDURE — 86850 RBC ANTIBODY SCREEN: CPT

## 2023-08-16 PROCEDURE — 99285 EMERGENCY DEPT VISIT HI MDM: CPT

## 2023-08-16 PROCEDURE — 85025 COMPLETE CBC W/AUTO DIFF WBC: CPT

## 2023-08-16 RX ORDER — ACETAMINOPHEN 650 MG/1
650 SUPPOSITORY RECTAL EVERY 4 HOURS PRN
Status: DISCONTINUED | OUTPATIENT
Start: 2023-08-16 | End: 2023-08-24 | Stop reason: HOSPADM

## 2023-08-16 RX ORDER — DULOXETIN HYDROCHLORIDE 30 MG/1
30 CAPSULE, DELAYED RELEASE ORAL NIGHTLY
Status: DISCONTINUED | OUTPATIENT
Start: 2023-08-16 | End: 2023-08-20

## 2023-08-16 RX ORDER — EMPAGLIFLOZIN 10 MG/1
10 TABLET, FILM COATED ORAL DAILY
COMMUNITY
Start: 2023-07-05

## 2023-08-16 RX ORDER — INSULIN LISPRO 100 [IU]/ML
0-4 INJECTION, SOLUTION INTRAVENOUS; SUBCUTANEOUS NIGHTLY
Status: DISCONTINUED | OUTPATIENT
Start: 2023-08-16 | End: 2023-08-24 | Stop reason: HOSPADM

## 2023-08-16 RX ORDER — ONDANSETRON 2 MG/ML
4 INJECTION INTRAMUSCULAR; INTRAVENOUS EVERY 6 HOURS PRN
Status: DISCONTINUED | OUTPATIENT
Start: 2023-08-16 | End: 2023-08-24 | Stop reason: HOSPADM

## 2023-08-16 RX ORDER — ZOLPIDEM TARTRATE 5 MG/1
5 TABLET ORAL NIGHTLY
Status: DISCONTINUED | OUTPATIENT
Start: 2023-08-16 | End: 2023-08-17

## 2023-08-16 RX ORDER — DULOXETIN HYDROCHLORIDE 30 MG/1
30 CAPSULE, DELAYED RELEASE ORAL NIGHTLY
Status: ON HOLD | COMMUNITY
End: 2023-08-24 | Stop reason: HOSPADM

## 2023-08-16 RX ORDER — INSULIN LISPRO 100 [IU]/ML
0-4 INJECTION, SOLUTION INTRAVENOUS; SUBCUTANEOUS
Status: DISCONTINUED | OUTPATIENT
Start: 2023-08-16 | End: 2023-08-24 | Stop reason: HOSPADM

## 2023-08-16 RX ORDER — ONDANSETRON 4 MG/1
4 TABLET, ORALLY DISINTEGRATING ORAL EVERY 8 HOURS PRN
Status: DISCONTINUED | OUTPATIENT
Start: 2023-08-16 | End: 2023-08-24 | Stop reason: HOSPADM

## 2023-08-16 RX ORDER — ACETAMINOPHEN 325 MG/1
650 TABLET ORAL EVERY 4 HOURS PRN
Status: DISCONTINUED | OUTPATIENT
Start: 2023-08-16 | End: 2023-08-24 | Stop reason: HOSPADM

## 2023-08-16 RX ORDER — SODIUM CHLORIDE 0.9 % (FLUSH) 0.9 %
5-40 SYRINGE (ML) INJECTION PRN
Status: DISCONTINUED | OUTPATIENT
Start: 2023-08-16 | End: 2023-08-24 | Stop reason: HOSPADM

## 2023-08-16 RX ORDER — SODIUM CHLORIDE 9 MG/ML
INJECTION, SOLUTION INTRAVENOUS PRN
Status: DISCONTINUED | OUTPATIENT
Start: 2023-08-16 | End: 2023-08-23

## 2023-08-16 RX ORDER — DIPHENHYDRAMINE HCL 25 MG
25 TABLET ORAL NIGHTLY
Status: DISCONTINUED | OUTPATIENT
Start: 2023-08-16 | End: 2023-08-19

## 2023-08-16 RX ORDER — INSULIN LISPRO 100 [IU]/ML
0-4 INJECTION, SOLUTION INTRAVENOUS; SUBCUTANEOUS
Status: DISCONTINUED | OUTPATIENT
Start: 2023-08-16 | End: 2023-08-16

## 2023-08-16 RX ORDER — TRAMADOL HYDROCHLORIDE 50 MG/1
50 TABLET ORAL EVERY 8 HOURS PRN
Status: ON HOLD | COMMUNITY
End: 2023-08-24 | Stop reason: HOSPADM

## 2023-08-16 RX ORDER — MECOBALAMIN 5000 MCG
10 TABLET,DISINTEGRATING ORAL NIGHTLY
Status: DISCONTINUED | OUTPATIENT
Start: 2023-08-16 | End: 2023-08-24 | Stop reason: HOSPADM

## 2023-08-16 RX ORDER — SODIUM CHLORIDE 0.9 % (FLUSH) 0.9 %
5-40 SYRINGE (ML) INJECTION EVERY 12 HOURS SCHEDULED
Status: DISCONTINUED | OUTPATIENT
Start: 2023-08-16 | End: 2023-08-23

## 2023-08-16 RX ORDER — SODIUM CHLORIDE 9 MG/ML
INJECTION, SOLUTION INTRAVENOUS PRN
Status: DISCONTINUED | OUTPATIENT
Start: 2023-08-16 | End: 2023-08-24 | Stop reason: HOSPADM

## 2023-08-16 RX ORDER — OCTREOTIDE ACETATE 100 UG/ML
100 INJECTION, SOLUTION INTRAVENOUS; SUBCUTANEOUS ONCE
Status: COMPLETED | OUTPATIENT
Start: 2023-08-16 | End: 2023-08-16

## 2023-08-16 RX ADMIN — MICONAZOLE NITRATE: 2 POWDER TOPICAL at 20:39

## 2023-08-16 RX ADMIN — DIPHENHYDRAMINE HYDROCHLORIDE 25 MG: 25 TABLET ORAL at 22:39

## 2023-08-16 RX ADMIN — ZOLPIDEM TARTRATE 5 MG: 5 TABLET ORAL at 22:39

## 2023-08-16 RX ADMIN — IOPAMIDOL 75 ML: 755 INJECTION, SOLUTION INTRAVENOUS at 14:46

## 2023-08-16 RX ADMIN — IRON SUCROSE 300 MG: 20 INJECTION, SOLUTION INTRAVENOUS at 10:28

## 2023-08-16 RX ADMIN — SODIUM CHLORIDE, PRESERVATIVE FREE 80 MG: 5 INJECTION INTRAVENOUS at 06:17

## 2023-08-16 RX ADMIN — OCTREOTIDE ACETATE 100 MCG: 100 INJECTION, SOLUTION INTRAVENOUS; SUBCUTANEOUS at 06:24

## 2023-08-16 RX ADMIN — ACETAMINOPHEN 650 MG: 325 TABLET ORAL at 22:39

## 2023-08-16 RX ADMIN — SODIUM CHLORIDE, PRESERVATIVE FREE 10 ML: 5 INJECTION INTRAVENOUS at 09:53

## 2023-08-16 RX ADMIN — INSULIN LISPRO 1 UNITS: 100 INJECTION, SOLUTION INTRAVENOUS; SUBCUTANEOUS at 16:41

## 2023-08-16 RX ADMIN — DULOXETINE HYDROCHLORIDE 30 MG: 30 CAPSULE, DELAYED RELEASE ORAL at 22:39

## 2023-08-16 RX ADMIN — MICONAZOLE NITRATE: 2 POWDER TOPICAL at 12:18

## 2023-08-16 RX ADMIN — OCTREOTIDE ACETATE 50 MCG/HR: 500 INJECTION, SOLUTION INTRAVENOUS; SUBCUTANEOUS at 06:32

## 2023-08-16 RX ADMIN — Medication 10 MG: at 22:39

## 2023-08-16 RX ADMIN — OCTREOTIDE ACETATE 50 MCG/HR: 500 INJECTION, SOLUTION INTRAVENOUS; SUBCUTANEOUS at 16:44

## 2023-08-16 RX ADMIN — SODIUM CHLORIDE 8 MG/HR: 9 INJECTION, SOLUTION INTRAVENOUS at 09:42

## 2023-08-16 RX ADMIN — SODIUM CHLORIDE 8 MG/HR: 9 INJECTION, SOLUTION INTRAVENOUS at 17:40

## 2023-08-16 RX ADMIN — INSULIN LISPRO 4 UNITS: 100 INJECTION, SOLUTION INTRAVENOUS; SUBCUTANEOUS at 12:14

## 2023-08-16 RX ADMIN — INSULIN LISPRO 4 UNITS: 100 INJECTION, SOLUTION INTRAVENOUS; SUBCUTANEOUS at 10:08

## 2023-08-16 SDOH — ECONOMIC STABILITY: INCOME INSECURITY: HOW HARD IS IT FOR YOU TO PAY FOR THE VERY BASICS LIKE FOOD, HOUSING, MEDICAL CARE, AND HEATING?: NOT HARD AT ALL

## 2023-08-16 SDOH — ECONOMIC STABILITY: INCOME INSECURITY: IN THE PAST 12 MONTHS, HAS THE ELECTRIC, GAS, OIL, OR WATER COMPANY THREATENED TO SHUT OFF SERVICE IN YOUR HOME?: YES

## 2023-08-16 SDOH — ECONOMIC STABILITY: FOOD INSECURITY: WITHIN THE PAST 12 MONTHS, YOU WORRIED THAT YOUR FOOD WOULD RUN OUT BEFORE YOU GOT MONEY TO BUY MORE.: NEVER TRUE

## 2023-08-16 ASSESSMENT — PAIN SCALES - GENERAL
PAINLEVEL_OUTOF10: 0
PAINLEVEL_OUTOF10: 7
PAINLEVEL_OUTOF10: 0

## 2023-08-16 ASSESSMENT — ENCOUNTER SYMPTOMS
SHORTNESS OF BREATH: 0
NAUSEA: 1
ABDOMINAL PAIN: 0
SHORTNESS OF BREATH: 1
NAUSEA: 0
VOMITING: 1
COUGH: 0
DIARRHEA: 0
VOMITING: 0

## 2023-08-16 ASSESSMENT — PATIENT HEALTH QUESTIONNAIRE - PHQ9
SUM OF ALL RESPONSES TO PHQ9 QUESTIONS 1 & 2: 0
SUM OF ALL RESPONSES TO PHQ QUESTIONS 1-9: 0
1. LITTLE INTEREST OR PLEASURE IN DOING THINGS: 0
2. FEELING DOWN, DEPRESSED OR HOPELESS: 0

## 2023-08-16 NOTE — PROGRESS NOTES
Pt returned from [address] [facility] and now transferred to room 715. Pt has no complaints on return. Pt was placed on telemetry for CT scan and remains on telemetry in PCU. Pt does have some problem standing from a sitting position. Pt states she \"hurt\" her L knee in fall on Monday. Had been using a walker at home and doing OK, but lying in bed has made it stiff. Pt has a walker in her room at call light on table by her chair. Report given at bedside to [facility] . No bleeding noted while in CCU today. Urine clear yellow, no stools.  Electronically signed by Lisa Ruelas RN on 8/16/2023 at 3:28 PM

## 2023-08-16 NOTE — PROGRESS NOTES
Pt is A & O x4. No c/o pain. HR sinus 62 /61. Lungs CTA. Pulses palpable. Pt is very pale. Hands and feet cool to touch. Abdomen is soft and non-tender. Pt denies any recent bloody or tarry stools. Purewick in place, but has leaked, will change. Pt is very red and moist in abdominal folds and bishop area. Yeast like odor. Pt cleaned and new purewick placed and cornstarch powder placed in skin folds that are red and moist, moisture barrier placed in bishop area.  Electronically signed by Luís Bianchi RN on 8/16/2023 at 11:57 AM

## 2023-08-16 NOTE — PLAN OF CARE
Problem: Discharge Planning  Goal: Discharge to home or other facility with appropriate resources  Outcome: Progressing     Problem: Skin/Tissue Integrity  Goal: Absence of new skin breakdown  Description: 1. Monitor for areas of redness and/or skin breakdown  2. Assess vascular access sites hourly  3. Every 4-6 hours minimum:  Change oxygen saturation probe site  4. Every 4-6 hours:  If on nasal continuous positive airway pressure, respiratory therapy assess nares and determine need for appliance change or resting period.   Outcome: Progressing     Problem: ABCDS Injury Assessment  Goal: Absence of physical injury  Outcome: Progressing     Problem: Pain  Goal: Verbalizes/displays adequate comfort level or baseline comfort level  Outcome: Progressing

## 2023-08-16 NOTE — CONSULTS
[facility] Vascular Access Team:  PowerGlide Midline/Extended Dwell IV Catheter  Insertion Procedure Note      Patient: Fernando Jones  YOB: 1955   MRN: [id number]  Room: [address] [place] [address]     Attending Physician - Ric Tomlin DO  Ordering Physician - Ric Tomlin DO    Diagnosis:   Near syncope [R55]  Frequent falls [R29.6]  Severe anemia [D64.9]  Acute anemia [D64.9]       Patient was in need of additional IV access for incompatible medications (protonix and sandostatin gtts) and blood administration with frequent blood draws. Procedure(s): Insertion of a 20 gauge 8 cm Single Lumen PowerGlide Catheter    Date of Procedure: 8/16/2023     Performed by: Sarah Saenz RN    Complications: None      Findings:   1. Successful insertion of PowerGlide Catheter. 2. PowerGlide is ready to be used. Please change tubing prior to using the new line. Make sure to label, date and use alcohol caps on new tubing and alcohol caps on unused ports. 3. Patient may be changed to a unit draw for lab work. Detailed Description of Procedure: The Right Forearm vein was visualized using the ultrasound and deemed a suitable vessel. The area was prepped with Chlorhexidine and draped in sterile fashion using partial barrier draping. The vein was accessed using US guidance. The 20 gauge 8 cm Single Lumen PowerGlide catheter was advanced into the vein using ANTT. Approximately 0 cm exposed. All lumens had brisk blood return and was flushed with 10 cc of NS. The catheter was secured using a securement device. A 3M CHG Tegaderm was placed over the securement device and insertion site. Dressing was dated and initialed with external measurement marked. Patient did tolerate procedure well.     Electronically signed by Sarah Saenz RN on 8/16/2023 at 8:16 AM

## 2023-08-16 NOTE — H&P
[facility] History and Physical    Patient Information:  Patient: Monik England  MRN: [id number]   Acct: [de-identified]  YOB: 1955  Admit Date: 8/16/2023      Date of Service: 8/16/2023  Primary Care Physician: Shayy Orozco MD  Advance Directive: Full Code  Health Care Proxy: Mr. Junito Avila, her son, ++[phone]\        SUBJECTIVE:    Chief Complaint   Patient presents with    Fall     Multiple falls x 1 week, dizziness, denies loss of consciousness      EP Sign Out:  Anemia of 4.0    HPI:  Mrs. Saba Rosa is a pleasant 79year old lady from home. She states that she has been ill since chelo Diverticulitis in May. She states that she has been really tired the last months. She had a mild anemias in the past. She was referred to us for anemia wori usd and management. She tells us that she had a EGD and C-Scop at  earlier this year and was bleeding at that time. She states that she has continued to have GIB. Review of Systems:   Review of Systems   Constitutional:  Positive for unexpected weight change. Negative for chills, diaphoresis, fatigue and fever. HENT:  Positive for sneezing. Respiratory:  Positive for shortness of breath (MEJIA, espcally bed when i can't do anything). Cardiovascular:  Positive for chest pain (if the heart was beating real fast\") and palpitations. Gastrointestinal:  Positive for nausea and vomiting. Genitourinary:  Positive for dysuria. Negative for frequency, hematuria and pelvic pain. Neurological:  Positive for dizziness (if rises too fast) and weakness. Negative for light-headedness. Psychiatric/Behavioral:  Negative for confusion.       Past Medical History:   Diagnosis Date    CHF (congestive heart failure) ([address])     Diabetes ([address])     type 2    HTN (hypertension)     Morbid obesity ([address])     Pulmonary embolism, bilateral ([address]) 2001     Past Psychiatric History:  Denies any    Past Surgical History:   Procedure Laterality Date    APPENDECTOMY N/A 1970    at age 17    CHOLECYSTECTOMY, LAPAROSCOPIC N/A 1991    HYSTERECTOMY, TOTAL ABDOMINAL (CERVIX REMOVED) N/A 1982    LEG TENOLYSIS Left 1992    Lipoma remova    TUBAL LIGATION Bilateral 1991 1991-1992     Social History       Tobacco History       Smoking Status  Former Smoking Start Date  1973 Quit Date  1996 Smoking Frequency  0.50 packs/day for 23.00 years (11.50 pk-yrs)    Smoking Tobacco Type  Cigarettes from 1973 to [address] Use  Former      Tobacco Comments  Started at age 17 (28), quit in 56 (at age 46), smoked 1/2 PPD for 23 years              Alcohol History       Alcohol Use Status  Not Currently Comment  \"back when i was younger\"              Drug Use       Drug Use Status  Never              Sexual Activity       Sexually Active  Not Asked Comment  has 3 kids                  Family History   Problem Relation Age of Onset    Coronary Art Dis Mother         Second Hand Smoker    Coronary Art Dis Father         SMOKER    Crohn's Disease Sister     Coronary Art Dis Brother         SMOKER    Hypertension Son     Depression Son     Fibromyalgia Daughter      Allergies   Allergen Reactions    Cephalexin Shortness Of Breath    Codeine Shortness Of Breath    Other Shortness Of Breath    Aspirin     Gabapentin     Keppra [Levetiracetam]     Meloxicam     Morphine     Sulfa Antibiotics     Vancomycin     Zyrtec [Cetirizine]     Levofloxacin Rash    Penicillins Rash     Home Medications:  Prior to Admission medications    Medication Sig Start Date End Date Taking?  Authorizing Provider   JARDIANCE 10 MG tablet Take 1 tablet by mouth daily 7/5/23   Historical Provider, MD   cloNIDine (CATAPRES) 0.2 MG tablet Take 0.2 mg by mouth    Historical Provider, MD   losartan (COZAAR) 100 MG tablet Take 100 mg by mouth    Historical Provider, MD   metoprolol (LOPRESSOR) 100 MG tablet Take 100 mg by mouth    Historical Provider, MD   montelukast (SINGULAIR) 10

## 2023-08-16 NOTE — CONSULTS
[facility] [address], [address] [place] [address]                                  CONSULTATION    PATIENT NAME: Sergei Reaves                    :        1955  MED REC NO:   [id number]                              ROOM:       [facility]  ACCOUNT NO:   [de-identified]                           ADMIT DATE: 2023  PROVIDER:     Adriane Esparza MD    CONSULT DATE:  2023    GI CONSULTATION    ASSESSMENT:  1. Severe microcytic hypochromic anemia with documented iron  deficiency which is symptomatic causing progressive weakness and  dizziness with a hemoglobin of 4.0 gm and hematocrits 15.9% on  admission. This anemia apparently has been going on for more than 1  year with the patient previously prescribed oral iron. She offers no  specific upper or lower GI complaints or observed symptoms of active GI  bleeding. EGD and colonoscopy done at [facility] on 2023,  were nondiagnostic, except for sigmoid diverticulosis. Endoscopy  reports have been requested. She was not told to stop her diclofenac  taken for arthritis for more than 3 years. 2.  Pulmonary embolus diagnosed , has been on maintenance Xarelto  since that time with hypercoagulation at the present time with prolonged  pro-time 46 seconds and INR 5.1 which may be contributing to possible  occult GI bleeding. 3.  Alleged 80-pound weight loss, significance to be determined. RECOMMENDATIONS:  1. Await EGD and colonoscopy reports from [facility]. 2.  Defer current endoscopy due to prolonged PT and INR, requiring  Xarelto to be withheld. 3.  Transfusion of 1 unit of packed cells, then transfuse one more unit  if hemoglobin less than 7 gm. Provide IV iron supplementation as well. 4.  Prophylactic Protonix. 5.  The patient instructed to discontinue NSAID use. 6.  Other diagnostic and therapeutic recommendations are contingent on  hospital course.     HISTORY OF PRESENT

## 2023-08-17 LAB
ACANTHOCYTES BLD QL SMEAR: ABNORMAL
ANION GAP SERPL CALCULATED.3IONS-SCNC: 14 MMOL/L (ref 7–19)
ANISOCYTOSIS BLD QL SMEAR: ABNORMAL
BASOPHILS # BLD: 0.2 K/UL (ref 0–0.2)
BASOPHILS NFR BLD: 1.1 % (ref 0–1)
BUN SERPL-MCNC: 11 MG/DL (ref 8–23)
BURR CELLS BLD QL SMEAR: ABNORMAL
CALCIUM SERPL-MCNC: 8.6 MG/DL (ref 8.8–10.2)
CHLORIDE SERPL-SCNC: 98 MMOL/L (ref 98–111)
CO2 SERPL-SCNC: 16 MMOL/L (ref 22–29)
CREAT SERPL-MCNC: 0.7 MG/DL (ref 0.5–0.9)
EOSINOPHIL # BLD: 0.1 K/UL (ref 0–0.6)
EOSINOPHIL NFR BLD: 0.6 % (ref 0–5)
ERYTHROCYTE [DISTWIDTH] IN BLOOD BY AUTOMATED COUNT: 22.8 % (ref 11.5–14.5)
GLUCOSE BLD-MCNC: 245 MG/DL (ref 70–99)
GLUCOSE BLD-MCNC: 330 MG/DL (ref 70–99)
GLUCOSE BLD-MCNC: 422 MG/DL (ref 70–99)
GLUCOSE SERPL-MCNC: 330 MG/DL (ref 74–109)
HCT VFR BLD AUTO: 26.1 % (ref 37–47)
HCT VFR BLD AUTO: 26.3 % (ref 37–47)
HGB BLD-MCNC: 7.3 G/DL (ref 12–16)
HGB BLD-MCNC: 7.4 G/DL (ref 12–16)
HYPOCHROMIA BLD QL SMEAR: ABNORMAL
IMM GRANULOCYTES # BLD: 0.2 K/UL
INR PPP: 1.75 (ref 0.88–1.18)
LYMPHOCYTES # BLD: 2 K/UL (ref 1.1–4.5)
LYMPHOCYTES NFR BLD: 11.3 % (ref 20–40)
MCH RBC QN AUTO: 20.3 PG (ref 27–31)
MCHC RBC AUTO-ENTMCNC: 27.8 G/DL (ref 33–37)
MCV RBC AUTO: 73.3 FL (ref 81–99)
MICROCYTES BLD QL SMEAR: ABNORMAL
MONOCYTES # BLD: 1.4 K/UL (ref 0–0.9)
MONOCYTES NFR BLD: 7.9 % (ref 0–10)
NEUTROPHILS # BLD: 13.9 K/UL (ref 1.5–7.5)
NEUTS SEG NFR BLD: 78 % (ref 50–65)
OVALOCYTES BLD QL SMEAR: ABNORMAL
PERFORMED ON: ABNORMAL
PLATELET # BLD AUTO: 442 K/UL (ref 130–400)
PLATELET SLIDE REVIEW: ABNORMAL
PMV BLD AUTO: 11.8 FL (ref 9.4–12.3)
POIKILOCYTOSIS BLD QL SMEAR: ABNORMAL
POLYCHROMASIA BLD QL SMEAR: ABNORMAL
POTASSIUM SERPL-SCNC: 4.5 MMOL/L (ref 3.5–5)
PROTHROMBIN TIME: 20 SEC (ref 12–14.6)
RBC # BLD AUTO: 3.59 M/UL (ref 4.2–5.4)
SODIUM SERPL-SCNC: 128 MMOL/L (ref 136–145)
WBC # BLD AUTO: 17.8 K/UL (ref 4.8–10.8)

## 2023-08-17 PROCEDURE — 99232 SBSQ HOSP IP/OBS MODERATE 35: CPT | Performed by: INTERNAL MEDICINE

## 2023-08-17 PROCEDURE — 6370000000 HC RX 637 (ALT 250 FOR IP): Performed by: INTERNAL MEDICINE

## 2023-08-17 PROCEDURE — 2580000003 HC RX 258: Performed by: HOSPITALIST

## 2023-08-17 PROCEDURE — 80048 BASIC METABOLIC PNL TOTAL CA: CPT

## 2023-08-17 PROCEDURE — 6360000002 HC RX W HCPCS: Performed by: INTERNAL MEDICINE

## 2023-08-17 PROCEDURE — 82962 GLUCOSE BLOOD TEST: CPT

## 2023-08-17 PROCEDURE — 6360000002 HC RX W HCPCS: Performed by: HOSPITALIST

## 2023-08-17 PROCEDURE — 85610 PROTHROMBIN TIME: CPT

## 2023-08-17 PROCEDURE — 6370000000 HC RX 637 (ALT 250 FOR IP): Performed by: HOSPITALIST

## 2023-08-17 PROCEDURE — 2140000000 HC CCU INTERMEDIATE R&B

## 2023-08-17 PROCEDURE — 85025 COMPLETE CBC W/AUTO DIFF WBC: CPT

## 2023-08-17 PROCEDURE — 2580000003 HC RX 258: Performed by: INTERNAL MEDICINE

## 2023-08-17 PROCEDURE — C9113 INJ PANTOPRAZOLE SODIUM, VIA: HCPCS | Performed by: HOSPITALIST

## 2023-08-17 PROCEDURE — APPSS15 APP SPLIT SHARED TIME 0-15 MINUTES: Performed by: NURSE PRACTITIONER

## 2023-08-17 RX ORDER — ZOLPIDEM TARTRATE 5 MG/1
10 TABLET ORAL NIGHTLY
Status: DISCONTINUED | OUTPATIENT
Start: 2023-08-17 | End: 2023-08-19

## 2023-08-17 RX ORDER — METOPROLOL TARTRATE 50 MG/1
50 TABLET, FILM COATED ORAL EVERY EVENING
Status: DISCONTINUED | OUTPATIENT
Start: 2023-08-17 | End: 2023-08-24 | Stop reason: HOSPADM

## 2023-08-17 RX ORDER — SEMAGLUTIDE 1.34 MG/ML
INJECTION, SOLUTION SUBCUTANEOUS
COMMUNITY

## 2023-08-17 RX ORDER — METOPROLOL TARTRATE 100 MG/1
50 TABLET ORAL NIGHTLY
COMMUNITY

## 2023-08-17 RX ORDER — TRAMADOL HYDROCHLORIDE 50 MG/1
50 TABLET ORAL EVERY 6 HOURS PRN
Status: DISCONTINUED | OUTPATIENT
Start: 2023-08-17 | End: 2023-08-19

## 2023-08-17 RX ORDER — GLIPIZIDE 10 MG/1
10 TABLET ORAL
COMMUNITY

## 2023-08-17 RX ORDER — DOCUSATE SODIUM 100 MG/1
100 CAPSULE, LIQUID FILLED ORAL 2 TIMES DAILY PRN
Status: ON HOLD | COMMUNITY
End: 2023-08-24 | Stop reason: HOSPADM

## 2023-08-17 RX ORDER — METOPROLOL TARTRATE 50 MG/1
100 TABLET, FILM COATED ORAL DAILY
Status: DISCONTINUED | OUTPATIENT
Start: 2023-08-17 | End: 2023-08-24 | Stop reason: HOSPADM

## 2023-08-17 RX ADMIN — IRON SUCROSE 300 MG: 20 INJECTION, SOLUTION INTRAVENOUS at 10:19

## 2023-08-17 RX ADMIN — Medication 10 MG: at 20:27

## 2023-08-17 RX ADMIN — TRAMADOL HYDROCHLORIDE 50 MG: 50 TABLET, COATED ORAL at 16:28

## 2023-08-17 RX ADMIN — ONDANSETRON 4 MG: 2 INJECTION INTRAMUSCULAR; INTRAVENOUS at 02:46

## 2023-08-17 RX ADMIN — OCTREOTIDE ACETATE 50 MCG/HR: 500 INJECTION, SOLUTION INTRAVENOUS; SUBCUTANEOUS at 02:41

## 2023-08-17 RX ADMIN — SODIUM CHLORIDE 8 MG/HR: 9 INJECTION, SOLUTION INTRAVENOUS at 12:50

## 2023-08-17 RX ADMIN — OCTREOTIDE ACETATE 50 MCG/HR: 500 INJECTION, SOLUTION INTRAVENOUS; SUBCUTANEOUS at 12:48

## 2023-08-17 RX ADMIN — INSULIN LISPRO 4 UNITS: 100 INJECTION, SOLUTION INTRAVENOUS; SUBCUTANEOUS at 11:03

## 2023-08-17 RX ADMIN — ONDANSETRON 4 MG: 2 INJECTION INTRAMUSCULAR; INTRAVENOUS at 09:36

## 2023-08-17 RX ADMIN — DIPHENHYDRAMINE HYDROCHLORIDE 25 MG: 25 TABLET ORAL at 20:28

## 2023-08-17 RX ADMIN — MICONAZOLE NITRATE: 2 POWDER TOPICAL at 20:27

## 2023-08-17 RX ADMIN — SODIUM CHLORIDE, PRESERVATIVE FREE 10 ML: 5 INJECTION INTRAVENOUS at 20:29

## 2023-08-17 RX ADMIN — INSULIN LISPRO 3 UNITS: 100 INJECTION, SOLUTION INTRAVENOUS; SUBCUTANEOUS at 17:24

## 2023-08-17 RX ADMIN — METOPROLOL TARTRATE 50 MG: 50 TABLET, FILM COATED ORAL at 17:24

## 2023-08-17 RX ADMIN — SODIUM CHLORIDE 8 MG/HR: 9 INJECTION, SOLUTION INTRAVENOUS at 02:42

## 2023-08-17 RX ADMIN — MICONAZOLE NITRATE: 2 POWDER TOPICAL at 11:05

## 2023-08-17 RX ADMIN — ONDANSETRON 4 MG: 4 TABLET, ORALLY DISINTEGRATING ORAL at 22:40

## 2023-08-17 RX ADMIN — METOPROLOL TARTRATE 100 MG: 50 TABLET, FILM COATED ORAL at 12:28

## 2023-08-17 RX ADMIN — ZOLPIDEM TARTRATE 10 MG: 5 TABLET ORAL at 20:27

## 2023-08-17 RX ADMIN — DULOXETINE HYDROCHLORIDE 30 MG: 30 CAPSULE, DELAYED RELEASE ORAL at 20:28

## 2023-08-17 RX ADMIN — SODIUM CHLORIDE, PRESERVATIVE FREE 10 ML: 5 INJECTION INTRAVENOUS at 09:36

## 2023-08-17 ASSESSMENT — PAIN DESCRIPTION - LOCATION: LOCATION: KNEE

## 2023-08-17 ASSESSMENT — PAIN DESCRIPTION - ORIENTATION: ORIENTATION: LEFT

## 2023-08-17 ASSESSMENT — PAIN DESCRIPTION - DESCRIPTORS: DESCRIPTORS: THROBBING

## 2023-08-17 ASSESSMENT — PAIN SCALES - GENERAL: PAINLEVEL_OUTOF10: 9

## 2023-08-17 NOTE — PROGRESS NOTES
Hospitalist Progress Note    Patient:  Burnard Kanner  YOB: 1955  Date of Service: 8/17/2023  MRN: [id number]   Acct: [de-identified]   Primary Care Physician: Kana Martin MD  Advance Directive: Full Code  Admit Date: 8/16/2023       Hospital Day: 1  Referring Provider: Jerry Canales DO    Patient Seen, Chart, Consults, Notes, Labs, Radiology studies reviewed. Subjective:  Burnard Kanner is a 79 y.o. female  whom we are following for iron deficiency anemia. She is still fairly weak. She required blood transfusion. She is currently receiving iron. She has had previous endoscopy and colonoscopy at [facility] purchase which did not show any evidence of acute bleed.     Allergies:  Cephalexin, Codeine, Other, Aspirin, Gabapentin, Keppra [levetiracetam], Meloxicam, Morphine, Sulfa antibiotics, Vancomycin, Zyrtec [cetirizine], Levofloxacin, and Penicillins    Medicines:  Current Facility-Administered Medications   Medication Dose Route Frequency Provider Last Rate Last Admin    metoprolol tartrate (LOPRESSOR) tablet 100 mg  100 mg Oral Daily Edwards Ally, DO   100 mg at 08/17/23 1228    metoprolol tartrate (LOPRESSOR) tablet 50 mg  50 mg Oral QPM Edwards Ally, DO   50 mg at 08/17/23 1724    zolpidem (AMBIEN) tablet 10 mg  10 mg Oral Nightly Edwards Ally, DO        traMADol Cleophus Jorge) tablet 50 mg  50 mg Oral Q6H PRN Edwards Ally, DO   50 mg at 08/17/23 1628    0.9 % sodium chloride infusion   IntraVENous PRN Maico Hardin MD        sodium chloride flush 0.9 % injection 5-40 mL  5-40 mL IntraVENous 2 times per day Maico Hardin MD   10 mL at 08/17/23 0936    sodium chloride flush 0.9 % injection 5-40 mL  5-40 mL IntraVENous PRN Maico Hardin MD        0.9 % sodium chloride infusion   IntraVENous PRN Maico Hardin MD        ondansetron (ZOFRAN-ODT) disintegrating tablet 4 mg  4 mg Oral Q8H PRN Maico Hardin MD        Or    ondansetron [facility]) injection 4 mg  4 mg normal.      Breath sounds: Normal breath sounds. Abdominal:      General: Abdomen is flat. Palpations: Abdomen is soft. Musculoskeletal:         General: Normal range of motion. Cervical back: Neck supple. No rigidity. No muscular tenderness. Skin:     General: Skin is warm and dry. Coloration: Skin is pale. Neurological:      Mental Status: She is alert and oriented to person, place, and time. Psychiatric:         Mood and Affect: Mood normal.       Labs:  BMP:   Recent Labs     08/16/23 0317 08/16/23  0631 08/17/23  0730   * 130* 128*   K 4.8 4.7 4.5   CL 96* 98 98   CO2 21* 22 16*   BUN 17 17 11   CREATININE 0.8 0.8 0.7   CALCIUM 8.1* 8.6* 8.6*     CBC:   Recent Labs     08/16/23 0317 08/16/23  1050 08/16/23  2212 08/17/23  0730   WBC 12.8*  --   --  17.8*   HGB 4.0* 5.0* 7.7* 7.3*   HCT 15.9* 18.4* 26.0* 26.3*   MCV 67.1*  --   --  73.3*     --   --  442*     LIVER PROFILE:   Recent Labs     08/16/23 0317   AST 73*   *   BILITOT 0.3   ALKPHOS 80     PT/INR:   Recent Labs     08/16/23 0317 08/17/23  0730   PROTIME 46.5* 20.0*   INR 5.17* 1.75*     APTT:   Recent Labs     08/16/23 0317   APTT 51.4*     BNP:  No results for input(s): BNP in the last 72 hours. Ionized Calcium:No results for input(s): IONCA in the last 72 hours. Magnesium:No results for input(s): MG in the last 72 hours. Phosphorus:No results for input(s): PHOS in the last 72 hours. HgbA1C: No results for input(s): LABA1C in the last 72 hours. Hepatic:   Recent Labs     08/16/23 0317   ALKPHOS 80   *   AST 73*   PROT 5.5*   BILITOT 0.3   LABALBU 3.0*     Lactic Acid:   Recent Labs     08/16/23  0631   LACTA 3.1*     Troponin: No results for input(s): CKTOTAL, CKMB, TROPONINT in the last 72 hours. ABGs: No results for input(s): PH, PCO2, PO2, HCO3, O2SAT in the last 72 hours. CRP:  No results for input(s): CRP in the last 72 hours.   Sed Rate:  No results for input(s): SEDRATE in the

## 2023-08-17 NOTE — PROGRESS NOTES
c/o nausea, no abdominal pain or symptoms of GI bleeding, vitals stable, obese non-tender abdomen, Hgb 4.0 transfused to 7.7 gm with 2 units pack cells yesterday, Hgb 7.3 gm today, daily IV iron in progress, CT abdomen normal, Protime 46 correctedto 20 seconds and Xarelto withheld, EGD and colonoscopy done 5/4/23 negative. Continue monitoring H&H and hemoccults as needed. Dr. Chidi Cobb will be assuming GI service tomorrow morning.

## 2023-08-18 LAB
ANION GAP SERPL CALCULATED.3IONS-SCNC: 11 MMOL/L (ref 7–19)
ANISOCYTOSIS BLD QL SMEAR: ABNORMAL
BASOPHILS # BLD: 0 K/UL (ref 0–0.2)
BASOPHILS NFR BLD: 0 % (ref 0–1)
BUN SERPL-MCNC: 13 MG/DL (ref 8–23)
BURR CELLS BLD QL SMEAR: ABNORMAL
CALCIUM SERPL-MCNC: 8.3 MG/DL (ref 8.8–10.2)
CHLORIDE SERPL-SCNC: 94 MMOL/L (ref 98–111)
CO2 SERPL-SCNC: 21 MMOL/L (ref 22–29)
CREAT SERPL-MCNC: 1 MG/DL (ref 0.5–0.9)
EOSINOPHIL # BLD: 0.24 K/UL (ref 0–0.6)
EOSINOPHIL NFR BLD: 1 % (ref 0–5)
ERYTHROCYTE [DISTWIDTH] IN BLOOD BY AUTOMATED COUNT: 24.5 % (ref 11.5–14.5)
GLUCOSE BLD-MCNC: 278 MG/DL (ref 70–99)
GLUCOSE BLD-MCNC: 282 MG/DL (ref 70–99)
GLUCOSE BLD-MCNC: 307 MG/DL (ref 70–99)
GLUCOSE BLD-MCNC: 331 MG/DL (ref 70–99)
GLUCOSE SERPL-MCNC: 269 MG/DL (ref 74–109)
HCT VFR BLD AUTO: 24.9 % (ref 37–47)
HCT VFR BLD AUTO: 25.3 % (ref 37–47)
HCT VFR BLD AUTO: 25.5 % (ref 37–47)
HGB BLD-MCNC: 7.2 G/DL (ref 12–16)
HYPOCHROMIA BLD QL SMEAR: ABNORMAL
IMM GRANULOCYTES # BLD: 0.4 K/UL
INR PPP: 2.07 (ref 0.88–1.18)
LYMPHOCYTES # BLD: 2.4 K/UL (ref 1.1–4.5)
LYMPHOCYTES NFR BLD: 10 % (ref 20–40)
MCH RBC QN AUTO: 21.2 PG (ref 27–31)
MCHC RBC AUTO-ENTMCNC: 28.9 G/DL (ref 33–37)
MCV RBC AUTO: 73.2 FL (ref 81–99)
MICROCYTES BLD QL SMEAR: ABNORMAL
MONOCYTES # BLD: 1.2 K/UL (ref 0–0.9)
MONOCYTES NFR BLD: 5 % (ref 0–10)
NEUTROPHILS # BLD: 19.7 K/UL (ref 1.5–7.5)
NEUTS BAND NFR BLD MANUAL: 1 % (ref 0–5)
NEUTS SEG NFR BLD: 83 % (ref 50–65)
OVALOCYTES BLD QL SMEAR: ABNORMAL
PERFORMED ON: ABNORMAL
PLATELET # BLD AUTO: 455 K/UL (ref 130–400)
PLATELET SLIDE REVIEW: ABNORMAL
PMV BLD AUTO: 11.3 FL (ref 9.4–12.3)
POIKILOCYTOSIS BLD QL SMEAR: ABNORMAL
POLYCHROMASIA BLD QL SMEAR: ABNORMAL
POTASSIUM SERPL-SCNC: 4.8 MMOL/L (ref 3.5–5)
PROTHROMBIN TIME: 22.8 SEC (ref 12–14.6)
RBC # BLD AUTO: 3.4 M/UL (ref 4.2–5.4)
SODIUM SERPL-SCNC: 126 MMOL/L (ref 136–145)
WBC # BLD AUTO: 23.5 K/UL (ref 4.8–10.8)

## 2023-08-18 PROCEDURE — 82962 GLUCOSE BLOOD TEST: CPT

## 2023-08-18 PROCEDURE — APPSS15 APP SPLIT SHARED TIME 0-15 MINUTES: Performed by: NURSE PRACTITIONER

## 2023-08-18 PROCEDURE — C9113 INJ PANTOPRAZOLE SODIUM, VIA: HCPCS | Performed by: HOSPITALIST

## 2023-08-18 PROCEDURE — 97161 PT EVAL LOW COMPLEX 20 MIN: CPT

## 2023-08-18 PROCEDURE — 2580000003 HC RX 258: Performed by: HOSPITALIST

## 2023-08-18 PROCEDURE — 6360000002 HC RX W HCPCS: Performed by: INTERNAL MEDICINE

## 2023-08-18 PROCEDURE — 2140000000 HC CCU INTERMEDIATE R&B

## 2023-08-18 PROCEDURE — 99232 SBSQ HOSP IP/OBS MODERATE 35: CPT | Performed by: INTERNAL MEDICINE

## 2023-08-18 PROCEDURE — 6360000002 HC RX W HCPCS: Performed by: HOSPITALIST

## 2023-08-18 PROCEDURE — 2580000003 HC RX 258: Performed by: INTERNAL MEDICINE

## 2023-08-18 PROCEDURE — 85025 COMPLETE CBC W/AUTO DIFF WBC: CPT

## 2023-08-18 PROCEDURE — 6370000000 HC RX 637 (ALT 250 FOR IP): Performed by: HOSPITALIST

## 2023-08-18 PROCEDURE — 85610 PROTHROMBIN TIME: CPT

## 2023-08-18 PROCEDURE — 97116 GAIT TRAINING THERAPY: CPT

## 2023-08-18 PROCEDURE — 85014 HEMATOCRIT: CPT

## 2023-08-18 PROCEDURE — 85018 HEMOGLOBIN: CPT

## 2023-08-18 PROCEDURE — 80048 BASIC METABOLIC PNL TOTAL CA: CPT

## 2023-08-18 PROCEDURE — 94760 N-INVAS EAR/PLS OXIMETRY 1: CPT

## 2023-08-18 PROCEDURE — 6370000000 HC RX 637 (ALT 250 FOR IP): Performed by: INTERNAL MEDICINE

## 2023-08-18 RX ADMIN — ZOLPIDEM TARTRATE 10 MG: 5 TABLET ORAL at 19:49

## 2023-08-18 RX ADMIN — MICONAZOLE NITRATE: 2 POWDER TOPICAL at 09:03

## 2023-08-18 RX ADMIN — Medication 10 MG: at 19:49

## 2023-08-18 RX ADMIN — MICONAZOLE NITRATE: 2 POWDER TOPICAL at 19:50

## 2023-08-18 RX ADMIN — OCTREOTIDE ACETATE 50 MCG/HR: 500 INJECTION, SOLUTION INTRAVENOUS; SUBCUTANEOUS at 01:10

## 2023-08-18 RX ADMIN — INSULIN LISPRO 2 UNITS: 100 INJECTION, SOLUTION INTRAVENOUS; SUBCUTANEOUS at 14:29

## 2023-08-18 RX ADMIN — INSULIN LISPRO 2 UNITS: 100 INJECTION, SOLUTION INTRAVENOUS; SUBCUTANEOUS at 08:53

## 2023-08-18 RX ADMIN — INSULIN LISPRO 3 UNITS: 100 INJECTION, SOLUTION INTRAVENOUS; SUBCUTANEOUS at 17:51

## 2023-08-18 RX ADMIN — SODIUM CHLORIDE, PRESERVATIVE FREE 10 ML: 5 INJECTION INTRAVENOUS at 08:53

## 2023-08-18 RX ADMIN — DIPHENHYDRAMINE HYDROCHLORIDE 25 MG: 25 TABLET ORAL at 19:49

## 2023-08-18 RX ADMIN — SODIUM CHLORIDE 8 MG/HR: 9 INJECTION, SOLUTION INTRAVENOUS at 01:11

## 2023-08-18 RX ADMIN — METOPROLOL TARTRATE 100 MG: 50 TABLET, FILM COATED ORAL at 08:51

## 2023-08-18 RX ADMIN — DULOXETINE HYDROCHLORIDE 30 MG: 30 CAPSULE, DELAYED RELEASE ORAL at 19:50

## 2023-08-18 RX ADMIN — METOPROLOL TARTRATE 50 MG: 50 TABLET, FILM COATED ORAL at 17:50

## 2023-08-18 RX ADMIN — INSULIN LISPRO 4 UNITS: 100 INJECTION, SOLUTION INTRAVENOUS; SUBCUTANEOUS at 21:17

## 2023-08-18 RX ADMIN — SODIUM CHLORIDE, PRESERVATIVE FREE 10 ML: 5 INJECTION INTRAVENOUS at 19:50

## 2023-08-18 RX ADMIN — IRON SUCROSE 400 MG: 20 INJECTION, SOLUTION INTRAVENOUS at 09:01

## 2023-08-18 ASSESSMENT — ENCOUNTER SYMPTOMS
CONSTIPATION: 0
NAUSEA: 0
DIARRHEA: 0
RHINORRHEA: 0
VOMITING: 0
COLOR CHANGE: 0
SHORTNESS OF BREATH: 0
VOICE CHANGE: 0
COUGH: 0
BACK PAIN: 0

## 2023-08-18 NOTE — PROGRESS NOTES
Patient is a unit draw. Last Hgb was checked at 10am. Not rechecked at 1200 or Q6 as it was ordered. Will obtain STAT Hgb and Hct. And adjust timing accordingly.

## 2023-08-18 NOTE — PROGRESS NOTES
Physical Therapy  Facility/Department: [facility] PROGRESSIVE CARE  Physical Therapy Initial Assessment    Name: Mauricio Boateng  : 1955  MRN: [id number]  Date of Service: 2023    Discharge Recommendations:  Continue to assess pending progress, Patient would benefit from continued therapy after discharge (AT CURRENT LEVEL OF FUNCTION, pt WOULD REQUIRE 24/7 ASSIST IF [address])          Patient Diagnosis(es): The primary encounter diagnosis was Acute anemia. Diagnoses of Near syncope and Frequent falls were also pertinent to this visit. Past Medical History:  has a past medical history of CHF (congestive heart failure) ([address]), Diabetes ([address]), HTN (hypertension), Morbid obesity ([address]), and Pulmonary embolism, bilateral ([address]). Past Surgical History:  has a past surgical history that includes Cholecystectomy, laparoscopic (N/A, ); Hysterectomy, total abdominal (N/A, ); Leg Tenolysis (Left, ); Appendectomy (N/A, ); and Tubal ligation (Bilateral, ). Assessment   Body Structures, Functions, Activity Limitations Requiring Skilled Therapeutic Intervention: Decreased functional mobility ; Decreased strength;Decreased safe awareness;Decreased endurance  Assessment: pT WOULD BENEFIT FROM SKILLED PT IN THIS SETTING TO PROGRESS HER MOBILITY AND ASSIST WITH DC PLAN  Therapy Prognosis: Good  Decision Making: Medium Complexity  Requires PT Follow-Up: Yes  Activity Tolerance  Activity Tolerance: Patient tolerated treatment well     Plan   Physcial Therapy Plan  General Plan: 5-7 times per week  Therapy Duration: 2 Weeks  Current Treatment Recommendations: Strengthening, Functional mobility training, Transfer training, Gait training, Therapeutic activities, Patient/Caregiver education & training, Safety education & training, Pain management, Positioning  Safety Devices  Type of Devices: Call light within reach, Gait belt, Left in chair, Chair alarm in place, Nurse notified     Restrictions        Subjective General  Diagnosis: ANEMIA  Follows Commands: Impaired  Other (Comment): LETHARGIC, SLOW TO RESPOND  Subjective  Subjective: pt STATES SHE IS READY TO TAKE A SHORT WALK IN ROOM AND THEN WOULD LIKE TO SIT UP IN THE RECLINER. DENIES PAIN         Social/Functional History  Social/Functional History  Lives With: Daughter  Home Access: Stairs to enter with rails  Entrance Stairs - Number of Steps: 2  Home Equipment: Rollator  ADL Assistance: Independent  Ambulation Assistance: Independent  Transfer Assistance: Independent  Vision/Hearing  Vision  Vision: Within Functional Limits  Hearing  Hearing: Within functional limits    Cognition   Cognition  Overall Cognitive Status: Exceptions  Arousal/Alertness: Delayed responses to stimuli  Following Commands: Follows one step commands with repetition  Safety Judgement: Decreased awareness of need for assistance  Problem Solving: Assistance required to generate solutions;Assistance required to implement solutions  Insights: Decreased awareness of deficits  Initiation: Requires cues for some  Sequencing: Requires cues for some  Cognition Comment: pt TALKING APPROPRIATELY WITH PT BUT DOES APPEAR LETHARGIC     Objective   Pulse: 73  Heart Rate Source: Monitor  BP: (!) 151/85  BP Location: Left Arm  BP Method: Automatic  Patient Position: Lying right side  MAP (Calculated): 107  Respirations: 18  SpO2: 94 %  O2 Device: None (Room air)              AROM RLE (degrees)  RLE AROM: WFL  AROM LLE (degrees)  LLE AROM : WFL  Strength Other  Other: GROSSLY ANTIGRAVITY           Bed mobility  Supine to Sit: Minimal assistance; Moderate assistance  Transfers  Sit to Stand: Minimal Assistance  Stand to Sit: Contact guard assistance  Ambulation  Surface: Level tile  Device: Rolling Walker  Assistance: Contact guard assistance  Gait Deviations: Slow Vilma;Decreased step length;Decreased step height  Distance: 20 FT                 OutComes Score

## 2023-08-18 NOTE — PROGRESS NOTES
GI  - PROGRESS NOTE    Subjective:   Admit Date: 8/16/2023  PCP: Rachel Collins MD    Patient being seen for: Profound anemia    24hr events/Interval History: Patient denies any complaints. No abdominal pain, hematemesis, hematochezia or melena. ADULT DIET; Full Liquid; 4 carb choices (60 gm/meal)     Tolerated                  Pain:None  Nausea:None      Medications:  Scheduled Meds:   metoprolol tartrate  100 mg Oral Daily    metoprolol tartrate  50 mg Oral QPM    zolpidem  10 mg Oral Nightly    sodium chloride flush  5-40 mL IntraVENous 2 times per day    iron sucrose  400 mg IntraVENous Once    miconazole   Topical BID    insulin lispro  0-4 Units SubCUTAneous Nightly    insulin lispro  0-4 Units SubCUTAneous TID WC    melatonin  10 mg Oral Nightly    diphenhydrAMINE  25 mg Oral Nightly    DULoxetine  30 mg Oral Nightly       Continuous Infusions:   sodium chloride      sodium chloride      octreotide (SandoSTATIN) infusion 50 mcg/hr (08/18/23 0110)    pantoprozole (PROTONIX) infusion 8 mg/hr (08/18/23 0111)    sodium chloride      sodium chloride         PRN Meds:.traMADol, sodium chloride, sodium chloride flush, sodium chloride, ondansetron **OR** ondansetron, acetaminophen **OR** acetaminophen, sodium chloride, sodium chloride      Labs:     Recent Labs     08/16/23 0317 08/16/23  1050 08/17/23  0730 08/17/23  2300 08/18/23  0550   WBC 12.8*  --  17.8*  --   --    RBC 2.37*  --  3.59*  --   --    HGB 4.0*   < > 7.3* 7.4* 7.2*   HCT 15.9*   < > 26.3* 26.1* 25.5*   MCV 67.1*  --  73.3*  --   --    MCH 16.9*  --  20.3*  --   --    MCHC 25.2*  --  27.8*  --   --      --  442*  --   --     < > = values in this interval not displayed.      Recent Labs     08/16/23 0317 08/16/23  0631 08/17/23  0730   * 130* 128*   K 4.8 4.7 4.5   ANIONGAP 12 10 14   CL 96* 98 98   CO2 21* 22 16*   BUN 17 17 11   CREATININE 0.8 0.8 0.7   GLUCOSE 447* 416* 330*

## 2023-08-18 NOTE — PROGRESS NOTES
Physical Therapy  Attempted PT eval, but pt declined at this time. States she would like to sleep .   Electronically signed by Ainsley Murrell PT on 8/18/2023 at 9:36 AM

## 2023-08-18 NOTE — PROGRESS NOTES
Patient up and down multiple times does not call for assistance. Bed alarm going off 2 to 3 times per hour.

## 2023-08-19 LAB
ACANTHOCYTES BLD QL SMEAR: ABNORMAL
ANION GAP SERPL CALCULATED.3IONS-SCNC: 19 MMOL/L (ref 7–19)
ANISOCYTOSIS BLD QL SMEAR: ABNORMAL
BASOPHILS # BLD: 0.1 K/UL (ref 0–0.2)
BASOPHILS NFR BLD: 0.3 % (ref 0–1)
BUN SERPL-MCNC: 18 MG/DL (ref 8–23)
CALCIUM SERPL-MCNC: 8.6 MG/DL (ref 8.8–10.2)
CHLORIDE SERPL-SCNC: 95 MMOL/L (ref 98–111)
CO2 SERPL-SCNC: 11 MMOL/L (ref 22–29)
CREAT SERPL-MCNC: 1 MG/DL (ref 0.5–0.9)
DACRYOCYTES BLD QL SMEAR: ABNORMAL
EOSINOPHIL # BLD: 0 K/UL (ref 0–0.6)
EOSINOPHIL NFR BLD: 0 % (ref 0–5)
ERYTHROCYTE [DISTWIDTH] IN BLOOD BY AUTOMATED COUNT: 26.4 % (ref 11.5–14.5)
GLUCOSE BLD-MCNC: 170 MG/DL (ref 70–99)
GLUCOSE BLD-MCNC: 197 MG/DL (ref 70–99)
GLUCOSE BLD-MCNC: 309 MG/DL (ref 70–99)
GLUCOSE BLD-MCNC: 345 MG/DL (ref 70–99)
GLUCOSE BLD-MCNC: 354 MG/DL (ref 70–99)
GLUCOSE SERPL-MCNC: 300 MG/DL (ref 74–109)
HBA1C MFR BLD: 7.5 % (ref 4–6)
HCT VFR BLD AUTO: 28.2 % (ref 37–47)
HCT VFR BLD AUTO: 30.4 % (ref 37–47)
HGB BLD-MCNC: 8 G/DL (ref 12–16)
HGB BLD-MCNC: 8.1 G/DL (ref 12–16)
HYPOCHROMIA BLD QL SMEAR: ABNORMAL
IMM GRANULOCYTES # BLD: 1.1 K/UL
LYMPHOCYTES # BLD: 1.2 K/UL (ref 1.1–4.5)
LYMPHOCYTES NFR BLD: 4.2 % (ref 20–40)
MCH RBC QN AUTO: 21.3 PG (ref 27–31)
MCHC RBC AUTO-ENTMCNC: 26.6 G/DL (ref 33–37)
MCV RBC AUTO: 80 FL (ref 81–99)
MONOCYTES # BLD: 1.5 K/UL (ref 0–0.9)
MONOCYTES NFR BLD: 5.4 % (ref 0–10)
NEUTROPHILS # BLD: 24.8 K/UL (ref 1.5–7.5)
NEUTS SEG NFR BLD: 86.3 % (ref 50–65)
OVALOCYTES BLD QL SMEAR: ABNORMAL
PERFORMED ON: ABNORMAL
PLATELET # BLD AUTO: 463 K/UL (ref 130–400)
PMV BLD AUTO: 12.7 FL (ref 9.4–12.3)
POIKILOCYTOSIS BLD QL SMEAR: ABNORMAL
POLYCHROMASIA BLD QL SMEAR: ABNORMAL
POTASSIUM SERPL-SCNC: 5.7 MMOL/L (ref 3.5–5)
RBC # BLD AUTO: 3.8 M/UL (ref 4.2–5.4)
SODIUM SERPL-SCNC: 125 MMOL/L (ref 136–145)
WBC # BLD AUTO: 28.7 K/UL (ref 4.8–10.8)

## 2023-08-19 PROCEDURE — 85303 CLOT INHIBIT PROT C ACTIVITY: CPT

## 2023-08-19 PROCEDURE — 86147 CARDIOLIPIN ANTIBODY EA IG: CPT

## 2023-08-19 PROCEDURE — 85307 ASSAY ACTIVATED PROTEIN C: CPT

## 2023-08-19 PROCEDURE — 82962 GLUCOSE BLOOD TEST: CPT

## 2023-08-19 PROCEDURE — 81240 F2 GENE: CPT

## 2023-08-19 PROCEDURE — 36415 COLL VENOUS BLD VENIPUNCTURE: CPT

## 2023-08-19 PROCEDURE — 80048 BASIC METABOLIC PNL TOTAL CA: CPT

## 2023-08-19 PROCEDURE — 94760 N-INVAS EAR/PLS OXIMETRY 1: CPT

## 2023-08-19 PROCEDURE — 83090 ASSAY OF HOMOCYSTEINE: CPT

## 2023-08-19 PROCEDURE — 85732 THROMBOPLASTIN TIME PARTIAL: CPT

## 2023-08-19 PROCEDURE — 6370000000 HC RX 637 (ALT 250 FOR IP): Performed by: INTERNAL MEDICINE

## 2023-08-19 PROCEDURE — 2580000003 HC RX 258: Performed by: HOSPITALIST

## 2023-08-19 PROCEDURE — 85613 RUSSELL VIPER VENOM DILUTED: CPT

## 2023-08-19 PROCEDURE — 85014 HEMATOCRIT: CPT

## 2023-08-19 PROCEDURE — 2580000003 HC RX 258: Performed by: INTERNAL MEDICINE

## 2023-08-19 PROCEDURE — 86146 BETA-2 GLYCOPROTEIN ANTIBODY: CPT

## 2023-08-19 PROCEDURE — 81241 F5 GENE: CPT

## 2023-08-19 PROCEDURE — 85306 CLOT INHIBIT PROT S FREE: CPT

## 2023-08-19 PROCEDURE — 83036 HEMOGLOBIN GLYCOSYLATED A1C: CPT

## 2023-08-19 PROCEDURE — 1210000000 HC MED SURG R&B

## 2023-08-19 PROCEDURE — 85670 THROMBIN TIME PLASMA: CPT

## 2023-08-19 PROCEDURE — 85730 THROMBOPLASTIN TIME PARTIAL: CPT

## 2023-08-19 PROCEDURE — 6370000000 HC RX 637 (ALT 250 FOR IP): Performed by: HOSPITALIST

## 2023-08-19 PROCEDURE — 85018 HEMOGLOBIN: CPT

## 2023-08-19 PROCEDURE — 99232 SBSQ HOSP IP/OBS MODERATE 35: CPT | Performed by: INTERNAL MEDICINE

## 2023-08-19 PROCEDURE — 85610 PROTHROMBIN TIME: CPT

## 2023-08-19 PROCEDURE — 6360000002 HC RX W HCPCS: Performed by: INTERNAL MEDICINE

## 2023-08-19 PROCEDURE — 85025 COMPLETE CBC W/AUTO DIFF WBC: CPT

## 2023-08-19 PROCEDURE — 85300 ANTITHROMBIN III ACTIVITY: CPT

## 2023-08-19 RX ORDER — DEXTROSE MONOHYDRATE 100 MG/ML
INJECTION, SOLUTION INTRAVENOUS CONTINUOUS PRN
Status: DISCONTINUED | OUTPATIENT
Start: 2023-08-19 | End: 2023-08-24 | Stop reason: HOSPADM

## 2023-08-19 RX ORDER — INSULIN GLARGINE 100 [IU]/ML
0.15 INJECTION, SOLUTION SUBCUTANEOUS NIGHTLY
Status: DISCONTINUED | OUTPATIENT
Start: 2023-08-19 | End: 2023-08-24 | Stop reason: HOSPADM

## 2023-08-19 RX ADMIN — MICONAZOLE NITRATE: 2 POWDER TOPICAL at 09:40

## 2023-08-19 RX ADMIN — INSULIN LISPRO 3 UNITS: 100 INJECTION, SOLUTION INTRAVENOUS; SUBCUTANEOUS at 12:11

## 2023-08-19 RX ADMIN — METOPROLOL TARTRATE 50 MG: 50 TABLET, FILM COATED ORAL at 17:39

## 2023-08-19 RX ADMIN — MICONAZOLE NITRATE: 2 POWDER TOPICAL at 20:30

## 2023-08-19 RX ADMIN — Medication 10 MG: at 20:30

## 2023-08-19 RX ADMIN — SODIUM CHLORIDE, PRESERVATIVE FREE 10 ML: 5 INJECTION INTRAVENOUS at 20:30

## 2023-08-19 RX ADMIN — ALTEPLASE 1 MG: 2.2 INJECTION, POWDER, LYOPHILIZED, FOR SOLUTION INTRAVENOUS at 10:59

## 2023-08-19 RX ADMIN — INSULIN LISPRO 4 UNITS: 100 INJECTION, SOLUTION INTRAVENOUS; SUBCUTANEOUS at 09:39

## 2023-08-19 RX ADMIN — DULOXETINE HYDROCHLORIDE 30 MG: 30 CAPSULE, DELAYED RELEASE ORAL at 20:30

## 2023-08-19 RX ADMIN — INSULIN GLARGINE 15 UNITS: 100 INJECTION, SOLUTION SUBCUTANEOUS at 20:30

## 2023-08-19 RX ADMIN — SODIUM CHLORIDE, PRESERVATIVE FREE 10 ML: 5 INJECTION INTRAVENOUS at 09:42

## 2023-08-19 RX ADMIN — METOPROLOL TARTRATE 100 MG: 50 TABLET, FILM COATED ORAL at 09:40

## 2023-08-19 NOTE — PROGRESS NOTES
[facility]. Reason for transfer: hemodynamically stable    Explained reason for transfer to Patient & family  Belongings with patient at bedside . Soft chart transferred with patient: Yes. Telemetry box transferred with patient: no, order not continued  Report given to: Tracy López RN, via telephone.       Electronically signed by Paolo Cool RN on 8/19/2023 at 12:06 PM

## 2023-08-19 NOTE — PROGRESS NOTES
4 Eyes Skin Assessment     NAME:  Mary Muhammad  YOB: 1955  MEDICAL RECORD NUMBER:  [id number]    The patient is being assessed for  Transfer to New Unit    I agree that at least one RN has performed a thorough Head to Toe Skin Assessment on the patient. ALL assessment sites listed below have been assessed. Areas assessed by both nurses:    Head, Face, Ears, Shoulders, Back, Chest, Arms, Elbows, Hands, Sacrum. Buttock, Coccyx, Ischium, and Legs. Feet and Heels        Does the Patient have a Wound?  No noted wound(s)       Amadou Prevention initiated by RN: No  Wound Care Orders initiated by RN: No    Pressure Injury (Stage 3,4, Unstageable, DTI, NWPT, and Complex wounds) if present, place Wound referral order by RN under : No    New Ostomies, if present place, Ostomy referral order under : No     Nurse 1 eSignature: Electronically signed by Raquel Govea RN on 8/19/23 at 2:55 PM CDT    **SHARE this note so that the co-signing nurse can place an eSignature**    Nurse 2 eSignature: Electronically signed by Maya Plascencia RN on 8/19/23 at 2:58 PM CDT

## 2023-08-20 LAB
AMORPH SED URNS QL MICRO: ABNORMAL /HPF
AMPHET UR QL SCN: NEGATIVE
ANION GAP SERPL CALCULATED.3IONS-SCNC: 18 MMOL/L (ref 7–19)
ANISOCYTOSIS BLD QL SMEAR: ABNORMAL
BACTERIA #/AREA URNS HPF: ABNORMAL /HPF
BARBITURATES UR QL SCN: NEGATIVE
BASOPHILS # BLD: 0 K/UL (ref 0–0.2)
BASOPHILS NFR BLD: 0 % (ref 0–1)
BENZODIAZ UR QL SCN: NEGATIVE
BILIRUB UR QL STRIP: ABNORMAL
BLOOD BANK DISPENSE STATUS: NORMAL
BLOOD BANK DISPENSE STATUS: NORMAL
BLOOD BANK PRODUCT CODE: NORMAL
BLOOD BANK PRODUCT CODE: NORMAL
BPU ID: NORMAL
BPU ID: NORMAL
BUN SERPL-MCNC: 21 MG/DL (ref 8–23)
BUPRENORPHINE URINE: NEGATIVE
BURR CELLS BLD QL SMEAR: ABNORMAL
CALCIUM SERPL-MCNC: 9.1 MG/DL (ref 8.8–10.2)
CANNABINOIDS UR QL SCN: NEGATIVE
CHLORIDE SERPL-SCNC: 93 MMOL/L (ref 98–111)
CLARITY UR: ABNORMAL
CO2 SERPL-SCNC: 19 MMOL/L (ref 22–29)
COCAINE UR QL SCN: NEGATIVE
COLOR UR: ABNORMAL
CREAT SERPL-MCNC: 0.9 MG/DL (ref 0.5–0.9)
CRYSTALS URNS MICRO: ABNORMAL /HPF
DESCRIPTION BLOOD BANK: NORMAL
DESCRIPTION BLOOD BANK: NORMAL
DRUG SCREEN COMMENT UR-IMP: NORMAL
EOSINOPHIL # BLD: 0 K/UL (ref 0–0.6)
EOSINOPHIL NFR BLD: 0 % (ref 0–5)
ERYTHROCYTE [DISTWIDTH] IN BLOOD BY AUTOMATED COUNT: 29 % (ref 11.5–14.5)
GLUCOSE BLD-MCNC: 233 MG/DL (ref 70–99)
GLUCOSE BLD-MCNC: 234 MG/DL (ref 70–99)
GLUCOSE BLD-MCNC: 238 MG/DL (ref 70–99)
GLUCOSE BLD-MCNC: 279 MG/DL (ref 70–99)
GLUCOSE SERPL-MCNC: 207 MG/DL (ref 74–109)
GLUCOSE UR STRIP.AUTO-MCNC: 250 MG/DL
HCT VFR BLD AUTO: 32 % (ref 37–47)
HGB BLD-MCNC: 9 G/DL (ref 12–16)
HGB UR STRIP.AUTO-MCNC: ABNORMAL MG/L
HYPOCHROMIA BLD QL SMEAR: ABNORMAL
IMM GRANULOCYTES # BLD: 1.2 K/UL
KETONES UR STRIP.AUTO-MCNC: NEGATIVE MG/DL
LEUKOCYTE ESTERASE UR QL STRIP.AUTO: ABNORMAL
LYMPHOCYTES # BLD: 3.7 K/UL (ref 1.1–4.5)
LYMPHOCYTES NFR BLD: 10 % (ref 20–40)
MCH RBC QN AUTO: 21.8 PG (ref 27–31)
MCHC RBC AUTO-ENTMCNC: 28.1 G/DL (ref 33–37)
MCV RBC AUTO: 77.5 FL (ref 81–99)
METHADONE UR QL SCN: NEGATIVE
METHAMPHETAMINE, URINE: NEGATIVE
MICROCYTES BLD QL SMEAR: ABNORMAL
MONOCYTES # BLD: 2.2 K/UL (ref 0–0.9)
MONOCYTES NFR BLD: 6 % (ref 0–10)
MYELOCYTES NFR BLD MANUAL: 1 %
NEUTROPHILS # BLD: 30.7 K/UL (ref 1.5–7.5)
NEUTS BAND NFR BLD MANUAL: 1 % (ref 0–5)
NEUTS SEG NFR BLD: 82 % (ref 50–65)
NITRITE UR QL STRIP.AUTO: NEGATIVE
OPIATES UR QL SCN: NEGATIVE
OVALOCYTES BLD QL SMEAR: ABNORMAL
OXYCODONE UR QL SCN: NEGATIVE
PCP UR QL SCN: NEGATIVE
PERFORMED ON: ABNORMAL
PH UR STRIP.AUTO: 5 [PH] (ref 5–8)
PLATELET # BLD AUTO: 522 K/UL (ref 130–400)
PLATELET SLIDE REVIEW: ABNORMAL
POIKILOCYTOSIS BLD QL SMEAR: ABNORMAL
POLYCHROMASIA BLD QL SMEAR: ABNORMAL
POTASSIUM SERPL-SCNC: 5.2 MMOL/L (ref 3.5–5)
PROPOXYPH UR QL SCN: NEGATIVE
PROT UR STRIP.AUTO-MCNC: 30 MG/DL
RBC # BLD AUTO: 4.13 M/UL (ref 4.2–5.4)
RBC #/AREA URNS HPF: ABNORMAL /HPF (ref 0–2)
SODIUM SERPL-SCNC: 130 MMOL/L (ref 136–145)
SP GR UR STRIP.AUTO: 1.02 (ref 1–1.03)
SQUAMOUS #/AREA URNS HPF: ABNORMAL /HPF
TRICYCLIC, URINE: NEGATIVE
UROBILINOGEN UR STRIP.AUTO-MCNC: 2 E.U./DL
WBC # BLD AUTO: 36.6 K/UL (ref 4.8–10.8)
WBC #/AREA URNS HPF: ABNORMAL /HPF (ref 0–5)

## 2023-08-20 PROCEDURE — 87075 CULTR BACTERIA EXCEPT BLOOD: CPT

## 2023-08-20 PROCEDURE — 80048 BASIC METABOLIC PNL TOTAL CA: CPT

## 2023-08-20 PROCEDURE — 87186 SC STD MICRODIL/AGAR DIL: CPT

## 2023-08-20 PROCEDURE — 2580000003 HC RX 258: Performed by: INTERNAL MEDICINE

## 2023-08-20 PROCEDURE — 80306 DRUG TEST PRSMV INSTRMNT: CPT

## 2023-08-20 PROCEDURE — 82962 GLUCOSE BLOOD TEST: CPT

## 2023-08-20 PROCEDURE — 94760 N-INVAS EAR/PLS OXIMETRY 1: CPT

## 2023-08-20 PROCEDURE — 87070 CULTURE OTHR SPECIMN AEROBIC: CPT

## 2023-08-20 PROCEDURE — 87150 DNA/RNA AMPLIFIED PROBE: CPT

## 2023-08-20 PROCEDURE — 85025 COMPLETE CBC W/AUTO DIFF WBC: CPT

## 2023-08-20 PROCEDURE — 6370000000 HC RX 637 (ALT 250 FOR IP): Performed by: INTERNAL MEDICINE

## 2023-08-20 PROCEDURE — APPSS15 APP SPLIT SHARED TIME 0-15 MINUTES: Performed by: NURSE PRACTITIONER

## 2023-08-20 PROCEDURE — 1210000000 HC MED SURG R&B

## 2023-08-20 PROCEDURE — 6370000000 HC RX 637 (ALT 250 FOR IP): Performed by: HOSPITALIST

## 2023-08-20 PROCEDURE — 86147 CARDIOLIPIN ANTIBODY EA IG: CPT

## 2023-08-20 PROCEDURE — 2580000003 HC RX 258: Performed by: HOSPITALIST

## 2023-08-20 PROCEDURE — 6360000002 HC RX W HCPCS: Performed by: INTERNAL MEDICINE

## 2023-08-20 PROCEDURE — 87040 BLOOD CULTURE FOR BACTERIA: CPT

## 2023-08-20 PROCEDURE — 36415 COLL VENOUS BLD VENIPUNCTURE: CPT

## 2023-08-20 PROCEDURE — 81001 URINALYSIS AUTO W/SCOPE: CPT

## 2023-08-20 PROCEDURE — 87077 CULTURE AEROBIC IDENTIFY: CPT

## 2023-08-20 PROCEDURE — 99231 SBSQ HOSP IP/OBS SF/LOW 25: CPT | Performed by: INTERNAL MEDICINE

## 2023-08-20 RX ORDER — LINEZOLID 2 MG/ML
600 INJECTION, SOLUTION INTRAVENOUS EVERY 12 HOURS
Status: DISCONTINUED | OUTPATIENT
Start: 2023-08-20 | End: 2023-08-24 | Stop reason: HOSPADM

## 2023-08-20 RX ADMIN — INSULIN LISPRO 1 UNITS: 100 INJECTION, SOLUTION INTRAVENOUS; SUBCUTANEOUS at 18:30

## 2023-08-20 RX ADMIN — INSULIN LISPRO 1 UNITS: 100 INJECTION, SOLUTION INTRAVENOUS; SUBCUTANEOUS at 10:56

## 2023-08-20 RX ADMIN — METOPROLOL TARTRATE 100 MG: 50 TABLET, FILM COATED ORAL at 10:57

## 2023-08-20 RX ADMIN — Medication 10 MG: at 19:43

## 2023-08-20 RX ADMIN — INSULIN GLARGINE 15 UNITS: 100 INJECTION, SOLUTION SUBCUTANEOUS at 19:43

## 2023-08-20 RX ADMIN — MICONAZOLE NITRATE: 2 POWDER TOPICAL at 11:42

## 2023-08-20 RX ADMIN — INSULIN LISPRO 2 UNITS: 100 INJECTION, SOLUTION INTRAVENOUS; SUBCUTANEOUS at 14:03

## 2023-08-20 RX ADMIN — LINEZOLID 600 MG: 600 INJECTION, SOLUTION INTRAVENOUS at 18:39

## 2023-08-20 RX ADMIN — AZTREONAM 1000 MG: 1 INJECTION, POWDER, LYOPHILIZED, FOR SOLUTION INTRAMUSCULAR; INTRAVENOUS at 18:29

## 2023-08-20 RX ADMIN — METOPROLOL TARTRATE 50 MG: 50 TABLET, FILM COATED ORAL at 18:31

## 2023-08-20 RX ADMIN — SODIUM CHLORIDE, PRESERVATIVE FREE 10 ML: 5 INJECTION INTRAVENOUS at 10:57

## 2023-08-20 NOTE — PROGRESS NOTES
Hospitalist Progress Note    Patient:  Giselle Rivas  YOB: 1955  Date of Service: 8/20/2023  MRN: [id number]   Acct: [de-identified]   Primary Care Physician: Latisha Smith MD  Advance Directive: Full Code  Admit Date: 8/16/2023       Hospital Day: 4  Referring Provider: Rupinder Whyte DO    Patient Seen, Chart, Consults, Notes, Labs, Radiology studies reviewed. Subjective:  Giselle Rivas is a 79 y.o. female  whom we are following for iron deficiency anemia. She has developed symptoms of sepsis. She had purulent drainage from an IV site on her left arm. She has vancomycin listed as an allergy. I started her on aztreonam and added Zyvox to her regimen. We will also get blood cultures.     Allergies:  Cephalexin, Codeine, Other, Aspirin, Gabapentin, Keppra [levetiracetam], Meloxicam, Morphine, Sulfa antibiotics, Vancomycin, Zyrtec [cetirizine], Levofloxacin, and Penicillins    Medicines:  Current Facility-Administered Medications   Medication Dose Route Frequency Provider Last Rate Last Admin    aztreonam (AZACTAM) 1,000 mg in sterile water 10 mL IV syringe  1,000 mg IntraVENous Q8H Rupinder Whyte DO        linezolid (ZYVOX) IVPB 600 mg  600 mg IntraVENous Q12H Rupinder Whyte DO        glucagon injection 1 mg  1 mg SubCUTAneous PRN Rupinder Whyte DO        dextrose 10 % infusion   IntraVENous Continuous PRN Rupinder Whyte DO        glucose chewable tablet 16 g  4 tablet Oral PRN Rupinder Whyte DO        dextrose bolus 10% 125 mL  125 mL IntraVENous PRN Rupinder Whyte DO        Or    dextrose bolus 10% 250 mL  250 mL IntraVENous PRN Rupinder Whyte DO        dextrose 10 % infusion   IntraVENous Continuous PRN Rupinder Whyte DO        insulin glargine (LANTUS) injection vial 15 Units  0.15 Units/kg SubCUTAneous Nightly Rupinder Whyte DO   15 Units at 08/19/23 2030    metoprolol tartrate (LOPRESSOR) tablet 100 mg  100 mg Oral Daily Brendalyn Glory

## 2023-08-20 NOTE — PLAN OF CARE
Problem: Discharge Planning  Goal: Discharge to home or other facility with appropriate resources  Outcome: Not Progressing     Problem: Skin/Tissue Integrity  Goal: Absence of new skin breakdown  Description: 1. Monitor for areas of redness and/or skin breakdown  2. Assess vascular access sites hourly  3. Every 4-6 hours minimum:  Change oxygen saturation probe site  4. Every 4-6 hours:  If on nasal continuous positive airway pressure, respiratory therapy assess nares and determine need for appliance change or resting period. Outcome: Not Progressing     Problem: ABCDS Injury Assessment  Goal: Absence of physical injury  Outcome: Not Progressing     Problem: Pain  Goal: Verbalizes/displays adequate comfort level or baseline comfort level  Outcome: Not Progressing     Problem: Safety - Adult  Goal: Free from fall injury  Outcome: Not Progressing     Problem: Discharge Planning  Goal: Discharge to home or other facility with appropriate resources  Outcome: Not Progressing     Problem: Skin/Tissue Integrity  Goal: Absence of new skin breakdown  Description: 1. Monitor for areas of redness and/or skin breakdown  2. Assess vascular access sites hourly  3. Every 4-6 hours minimum:  Change oxygen saturation probe site  4. Every 4-6 hours:  If on nasal continuous positive airway pressure, respiratory therapy assess nares and determine need for appliance change or resting period.   Outcome: Not Progressing     Problem: ABCDS Injury Assessment  Goal: Absence of physical injury  Outcome: Not Progressing     Problem: Pain  Goal: Verbalizes/displays adequate comfort level or baseline comfort level  Outcome: Not Progressing     Problem: Safety - Adult  Goal: Free from fall injury  Outcome: Not Progressing

## 2023-08-21 LAB
ACANTHOCYTES BLD QL SMEAR: ABNORMAL
ANION GAP SERPL CALCULATED.3IONS-SCNC: 13 MMOL/L (ref 7–19)
ANISOCYTOSIS BLD QL SMEAR: ABNORMAL
BASOPHILS # BLD: 0.1 K/UL (ref 0–0.2)
BASOPHILS NFR BLD: 0.3 % (ref 0–1)
BUN SERPL-MCNC: 22 MG/DL (ref 8–23)
BURR CELLS BLD QL SMEAR: ABNORMAL
CALCIUM SERPL-MCNC: 8.5 MG/DL (ref 8.8–10.2)
CHLORIDE SERPL-SCNC: 96 MMOL/L (ref 98–111)
CO2 SERPL-SCNC: 23 MMOL/L (ref 22–29)
CREAT SERPL-MCNC: 0.8 MG/DL (ref 0.5–0.9)
DACRYOCYTES BLD QL SMEAR: ABNORMAL
EOSINOPHIL # BLD: 0.2 K/UL (ref 0–0.6)
EOSINOPHIL NFR BLD: 0.7 % (ref 0–5)
ERYTHROCYTE [DISTWIDTH] IN BLOOD BY AUTOMATED COUNT: 31.4 % (ref 11.5–14.5)
GLUCOSE BLD-MCNC: 192 MG/DL (ref 70–99)
GLUCOSE BLD-MCNC: 229 MG/DL (ref 70–99)
GLUCOSE BLD-MCNC: 263 MG/DL (ref 70–99)
GLUCOSE BLD-MCNC: 269 MG/DL (ref 70–99)
GLUCOSE SERPL-MCNC: 142 MG/DL (ref 74–109)
HCT VFR BLD AUTO: 33.8 % (ref 37–47)
HGB BLD-MCNC: 8.7 G/DL (ref 12–16)
HYPOCHROMIA BLD QL SMEAR: ABNORMAL
IMM GRANULOCYTES # BLD: 0.3 K/UL
LYMPHOCYTES # BLD: 2.4 K/UL (ref 1.1–4.5)
LYMPHOCYTES NFR BLD: 10.8 % (ref 20–40)
MCH RBC QN AUTO: 22.2 PG (ref 27–31)
MCHC RBC AUTO-ENTMCNC: 25.7 G/DL (ref 33–37)
MCV RBC AUTO: 86.2 FL (ref 81–99)
MICROCYTES BLD QL SMEAR: ABNORMAL
MONOCYTES # BLD: 1.8 K/UL (ref 0–0.9)
MONOCYTES NFR BLD: 8 % (ref 0–10)
NEUTROPHILS # BLD: 17.9 K/UL (ref 1.5–7.5)
NEUTS SEG NFR BLD: 78.7 % (ref 50–65)
OVALOCYTES BLD QL SMEAR: ABNORMAL
PERFORMED ON: ABNORMAL
PLATELET # BLD AUTO: 387 K/UL (ref 130–400)
PLATELET SLIDE REVIEW: ADEQUATE
PMV BLD AUTO: 12.2 FL (ref 9.4–12.3)
POIKILOCYTOSIS BLD QL SMEAR: ABNORMAL
POLYCHROMASIA BLD QL SMEAR: ABNORMAL
POTASSIUM SERPL-SCNC: 4.4 MMOL/L (ref 3.5–5)
RBC # BLD AUTO: 3.92 M/UL (ref 4.2–5.4)
SODIUM SERPL-SCNC: 132 MMOL/L (ref 136–145)
WBC # BLD AUTO: 22.7 K/UL (ref 4.8–10.8)

## 2023-08-21 PROCEDURE — 82962 GLUCOSE BLOOD TEST: CPT

## 2023-08-21 PROCEDURE — 2580000003 HC RX 258: Performed by: HOSPITALIST

## 2023-08-21 PROCEDURE — 85301 ANTITHROMBIN III ANTIGEN: CPT

## 2023-08-21 PROCEDURE — 80048 BASIC METABOLIC PNL TOTAL CA: CPT

## 2023-08-21 PROCEDURE — 97116 GAIT TRAINING THERAPY: CPT

## 2023-08-21 PROCEDURE — 94760 N-INVAS EAR/PLS OXIMETRY 1: CPT

## 2023-08-21 PROCEDURE — 2580000003 HC RX 258: Performed by: INTERNAL MEDICINE

## 2023-08-21 PROCEDURE — 6370000000 HC RX 637 (ALT 250 FOR IP): Performed by: INTERNAL MEDICINE

## 2023-08-21 PROCEDURE — 6370000000 HC RX 637 (ALT 250 FOR IP): Performed by: HOSPITALIST

## 2023-08-21 PROCEDURE — 6360000002 HC RX W HCPCS: Performed by: INTERNAL MEDICINE

## 2023-08-21 PROCEDURE — 36415 COLL VENOUS BLD VENIPUNCTURE: CPT

## 2023-08-21 PROCEDURE — 2500000003 HC RX 250 WO HCPCS: Performed by: INTERNAL MEDICINE

## 2023-08-21 PROCEDURE — 85306 CLOT INHIBIT PROT S FREE: CPT

## 2023-08-21 PROCEDURE — 99232 SBSQ HOSP IP/OBS MODERATE 35: CPT | Performed by: INTERNAL MEDICINE

## 2023-08-21 PROCEDURE — 85025 COMPLETE CBC W/AUTO DIFF WBC: CPT

## 2023-08-21 PROCEDURE — 97110 THERAPEUTIC EXERCISES: CPT

## 2023-08-21 PROCEDURE — 1210000000 HC MED SURG R&B

## 2023-08-21 RX ADMIN — MICONAZOLE NITRATE: 2 POWDER TOPICAL at 21:59

## 2023-08-21 RX ADMIN — SODIUM CHLORIDE, PRESERVATIVE FREE 10 ML: 5 INJECTION INTRAVENOUS at 09:53

## 2023-08-21 RX ADMIN — AZTREONAM 1000 MG: 1 INJECTION, POWDER, LYOPHILIZED, FOR SOLUTION INTRAMUSCULAR; INTRAVENOUS at 00:42

## 2023-08-21 RX ADMIN — LINEZOLID 600 MG: 600 INJECTION, SOLUTION INTRAVENOUS at 05:38

## 2023-08-21 RX ADMIN — LINEZOLID 600 MG: 600 INJECTION, SOLUTION INTRAVENOUS at 17:00

## 2023-08-21 RX ADMIN — MICONAZOLE NITRATE: 2 POWDER TOPICAL at 09:53

## 2023-08-21 RX ADMIN — Medication 10 MG: at 21:58

## 2023-08-21 RX ADMIN — INSULIN LISPRO 2 UNITS: 100 INJECTION, SOLUTION INTRAVENOUS; SUBCUTANEOUS at 12:27

## 2023-08-21 RX ADMIN — AZTREONAM 1000 MG: 1 INJECTION, POWDER, LYOPHILIZED, FOR SOLUTION INTRAMUSCULAR; INTRAVENOUS at 21:58

## 2023-08-21 RX ADMIN — AZTREONAM 1000 MG: 1 INJECTION, POWDER, LYOPHILIZED, FOR SOLUTION INTRAMUSCULAR; INTRAVENOUS at 05:34

## 2023-08-21 RX ADMIN — AZTREONAM 1000 MG: 1 INJECTION, POWDER, LYOPHILIZED, FOR SOLUTION INTRAMUSCULAR; INTRAVENOUS at 15:34

## 2023-08-21 RX ADMIN — METOPROLOL TARTRATE 50 MG: 50 TABLET, FILM COATED ORAL at 17:06

## 2023-08-21 RX ADMIN — INSULIN LISPRO 1 UNITS: 100 INJECTION, SOLUTION INTRAVENOUS; SUBCUTANEOUS at 09:49

## 2023-08-21 RX ADMIN — METOPROLOL TARTRATE 100 MG: 50 TABLET, FILM COATED ORAL at 09:49

## 2023-08-21 RX ADMIN — SODIUM CHLORIDE, PRESERVATIVE FREE 10 ML: 5 INJECTION INTRAVENOUS at 22:00

## 2023-08-21 RX ADMIN — INSULIN GLARGINE 15 UNITS: 100 INJECTION, SOLUTION SUBCUTANEOUS at 21:59

## 2023-08-21 ASSESSMENT — ENCOUNTER SYMPTOMS
RHINORRHEA: 0
VOICE CHANGE: 0
VOMITING: 0
SHORTNESS OF BREATH: 0
NAUSEA: 0
BACK PAIN: 0
COLOR CHANGE: 0
COUGH: 0
CONSTIPATION: 0
DIARRHEA: 0

## 2023-08-21 NOTE — PROGRESS NOTES
Comprehensive Nutrition Assessment    Type and Reason for Visit:  Initial, RD Nutrition Re-Screen/LOS    Nutrition Recommendations/Plan:   Continue current diet as tolerated. Malnutrition Assessment:  Malnutrition Status: At risk for malnutrition (Comment) (08/21/23 1343)    Context:  Acute Illness     Findings of the 6 clinical characteristics of malnutrition:  Energy Intake:  Mild decrease in energy intake (Comment)  Weight Loss:  No significant weight loss     Body Fat Loss:  No significant body fat loss     Muscle Mass Loss:  No significant muscle mass loss    Fluid Accumulation:  Mild Extremities   Strength:  Not Performed    Nutrition Assessment:    LOS day 5. Pt improved nutritionally from admission AEB improved po intake of meals (0% to % of last meal per flowsheet). Wt hx reviewed in care everywhere, no significant wt changes identified through 12/2022. Glucose 142-279, Hgb A1C 7.5%. Recommend continue current diet. Nutrition Related Findings:    +1 RUE, NP BLE edema Wound Type: None       Current Nutrition Intake & Therapies:    Average Meal Intake: 0%, %  Average Supplements Intake: None Ordered  ADULT DIET; Regular; 5 carb choices (75 gm/meal)    Anthropometric Measures:  Height: 5' (152.4 cm)  Ideal Body Weight (IBW): 100 lbs (45 kg)    Admission Body Weight: 223 lb (101.2 kg) (standing scale)  Current Body Weight: 228 lb (103.4 kg),   IBW.  Weight Source: Bed Scale  Current BMI (kg/m2): 44.5  Usual Body Weight: 222 lb (100.7 kg) (12/2022)  % Weight Change (Calculated): 2.7                    BMI Categories: Obese Class 3 (BMI 40.0 or greater)    Estimated Daily Nutrient Needs:  Energy Requirements Based On: Kcal/kg  Weight Used for Energy Requirements: Current (8-15 kcals/kg)  Energy (kcal/day): 473-3225 kcals/day  Weight Used for Protein Requirements: Ideal (2 g/kg)  Protein (g/day): 90 g/pro/day  Method Used for Fluid Requirements: 1 ml/kcal  Fluid (ml/day): 229-8856

## 2023-08-21 NOTE — PROGRESS NOTES
08/21/23 1100   Subjective   Subjective Pt. in chair agrees to therapy has to use BSC first.   Pain Assessment   Pain Assessment None - Denies Pain   Cognition   Overall Cognitive Status Exceptions   Arousal/Alertness Delayed responses to stimuli   Following Commands Inconsistently follows commands   Safety Judgement Decreased awareness of need for safety   Problem Solving Assistance required to generate solutions;Assistance required to implement solutions   Insights Decreased awareness of deficits   Initiation Requires cues for some   Sequencing Requires cues for some   Cognition Comment Great difficulty following commands. Vitals   O2 Device None (Room air)   Transfer Training   Transfer Training Yes   Sit to Stand Minimum assistance   Stand to Sit Contact-guard assistance   Gait Training   Gait Training Yes   Gait   Overall Level of Assistance Contact-guard assistance   Speed/Vilma Slow   Distance (ft) 20 Feet   Assistive Device Walker, rolling  (Gait stopped 2 dizziness. Chair brought to patient, patient rolled back to room. )   PT Exercises   A/AROM Exercises In sitting 10 reps LAQ patient  could not understand how to do hip flexion even with demonstration. Patient Education   Education Given To Patient   Education Provided Role of Therapy;Plan of Care; Fall Prevention Strategies   Education Provided Comments Difficulty following commands   Education Method Demonstration;Verbal   Barriers to Learning Cognition   Education Outcome Continued education needed   Other Specialty Interventions   Other Treatments/Modalities In chair alarm attached call light;all needs in reach.    Assessment   Activity Tolerance Patient tolerated treatment well   PT Plan of Care   Monday X       Electronically signed by Real Perez PTA on 8/21/2023 at 11:39 AM

## 2023-08-21 NOTE — CARE COORDINATION
Case Management Assessment  Initial Evaluation    Date/Time of Evaluation: 8/21/2023 5:25 PM  Assessment Completed by: AMANDA White    If patient is discharged prior to next notation, then this note serves as note for discharge by case management. Patient Name: Edel Muhammad                   YOB: 1955  Diagnosis: Near syncope [R55]  Frequent falls [R29.6]  Severe anemia [D64.9]  Acute anemia [D64.9]                   Date / Time: 8/16/2023  3:11 AM    Patient Admission Status: Inpatient   Readmission Risk (Low < 19, Mod (19-27), High > 27): Readmission Risk Score: 15.2    Current PCP: Latisha Smith MD  PCP verified by CM? Yes    Chart Reviewed: Yes      History Provided by: Patient, Child/Family, Medical Record  Patient Orientation: Alert and Oriented (some confusion/forgetfulness noted)    Patient Cognition: Alert    Hospitalization in the last 30 days (Readmission):  No    If yes, Readmission Assessment in CM Navigator will be completed. Advance Directives:      Code Status: Full Code   Patient's Primary Decision Maker is: Legal Next of Kin      Discharge Planning:    Patient lives with: Children Type of Home: House  Primary Care Giver: Self  Patient Support Systems include: Children, Family Members   Current Financial resources: Medicaid, Medicare  Current community resources: None  Current services prior to admission: Durable Medical Equipment            Current DME: Shower Chair, Walker            Type of Home Care services:  None    ADLS  Prior functional level: Assistance with the following:, Housework, Shopping, Cooking  Current functional level: Assistance with the following:, Bathing, Cooking, Housework, Shopping    PT AM-PAC:   /24  OT AM-PAC:   /24    Family can provide assistance at DC: Yes  Would you like Case Management to discuss the discharge plan with any other family members/significant others, and if so, who?  Yes (okay to speak with kids)  Plans to Return to were provided with a choice of provider and agrees with the discharge plan. Freedom of choice list with basic dialogue that supports the patient's individualized plan of care/goals and shares the quality data associated with the providers was provided to: Patient, Patient Representative   Patient Representative Name: alfreda Hart     The Patient and/or Patient Representative Agree with the Discharge Plan?  Yes    Matthew Ogden [place]  Case Management Department  Ph: [phone] Fax: [phone]  Electronically signed by AMANDA Murry on 8/21/2023 at 5:27 PM

## 2023-08-21 NOTE — PROGRESS NOTES
Hospitalist Progress Note    Patient:  Vale Stewart  YOB: 1955  Date of Service: 8/21/2023  MRN: [id number]   Acct: [de-identified]   Primary Care Physician: Randa Saldaña MD  Advance Directive: Full Code  Admit Date: 8/16/2023       Hospital Day: 5  Referring Provider: Effie Mccall DO    Patient Seen, Chart, Consults, Notes, Labs, Radiology studies reviewed. Subjective:  Vale Stewart is a 79 y.o. female  whom we are following for iron deficiency anemia abscess and phlebitis in the left antecubital fossa. She is doing much better. She is feeling better. Blood cultures no growth so far.     Allergies:  Cephalexin, Codeine, Other, Aspirin, Gabapentin, Keppra [levetiracetam], Meloxicam, Morphine, Sulfa antibiotics, Vancomycin, Zyrtec [cetirizine], Levofloxacin, and Penicillins    Medicines:  Current Facility-Administered Medications   Medication Dose Route Frequency Provider Last Rate Last Admin    aztreonam (AZACTAM) 1,000 mg in sterile water 10 mL IV syringe  1,000 mg IntraVENous Q8H Effie Mccall, DO   1,000 mg at 08/21/23 1534    linezolid (ZYVOX) IVPB 600 mg  600 mg IntraVENous Q12H Effie Mccall DO   Stopped at 08/21/23 9636    glucagon injection 1 mg  1 mg SubCUTAneous PRN Effie Mccall,         dextrose 10 % infusion   IntraVENous Continuous PRN Effie Mccall,         glucose chewable tablet 16 g  4 tablet Oral PRN Effie Mccall, DO        dextrose bolus 10% 125 mL  125 mL IntraVENous PRN Effie Mccall DO        Or    dextrose bolus 10% 250 mL  250 mL IntraVENous PRN Effie Mccall, DO        dextrose 10 % infusion   IntraVENous Continuous PRN Effie Mccall,         insulin glargine (LANTUS) injection vial 15 Units  0.15 Units/kg SubCUTAneous Nightly Effie Mccall DO   15 Units at 08/20/23 1943    metoprolol tartrate (LOPRESSOR) tablet 100 mg  100 mg Oral Daily Effie Mccall DO   100 mg at 08/21/23 5466 HYSTERECTOMY, TOTAL ABDOMINAL (CERVIX REMOVED) N/A     LEG TENOLYSIS Left     Lipoma remova    TUBAL LIGATION Bilateral 1991       Family History  Family History   Problem Relation Age of Onset    Coronary Art Dis Mother         Second Hand Smoker    Coronary Art Dis Father         SMOKER    Crohn's Disease Sister     Coronary Art Dis Brother         SMOKER    Hypertension Son     Depression Son     Fibromyalgia Daughter        Social History  Social History     Socioeconomic History    Marital status:      Spouse name: Not on file    Number of children: 3    Years of education: Not on file    Highest education level: Not on file   Occupational History    Occupation: [employer]     Comment: worked in [employer]    Smoking status: Former     Packs/day: 0.50     Years: 23.00     Pack years: 11.50     Types: Cigarettes     Start date:      Quit date:      Years since quittin.6    Smokeless tobacco: Former    Tobacco comments:     Started at age 17 (5), quit in 56 (at age 46), smoked 1/2 PPD for 23 years   Vaping Use    Vaping Use: Never used   Substance and Sexual Activity    Alcohol use: Not Currently     Comment: \"back when i was younger\"    Drug use: Never    Sexual activity: Not on file     Comment: has 3 kids   Other Topics Concern    Not on file   Social History Narrative    CODE STATUS: Full Code    HEALTH CARE PROXY: Mr. Natalee Jamil, her son, [phone]    AMBULATES: uses a walker the last few days    DOMICILED: has 2 steps to reach her door, has no stairs inside home, lives alone, lives with  and they dhrhas a dog     Social Determinants of Health     Financial Resource Strain: Low Risk     Difficulty of Paying Living Expenses: Not hard at all   Food Insecurity: Unknown    Worried About [place] in the Last Year: Never true    [address] in the Last Year: Not on file   Transportation Needs: Not on file   Physical Activity: Not on

## 2023-08-22 LAB
ANION GAP SERPL CALCULATED.3IONS-SCNC: 13 MMOL/L (ref 7–19)
ANISOCYTOSIS BLD QL SMEAR: ABNORMAL
BASOPHILS # BLD: 0 K/UL (ref 0–0.2)
BASOPHILS NFR BLD: 0.3 % (ref 0–1)
BUN SERPL-MCNC: 17 MG/DL (ref 8–23)
BURR CELLS BLD QL SMEAR: ABNORMAL
CALCIUM SERPL-MCNC: 8.4 MG/DL (ref 8.8–10.2)
CARDIOLIPIN IGA SER IA-ACNC: <10 APL
CHLORIDE SERPL-SCNC: 96 MMOL/L (ref 98–111)
CO2 SERPL-SCNC: 21 MMOL/L (ref 22–29)
CREAT SERPL-MCNC: 0.6 MG/DL (ref 0.5–0.9)
DACRYOCYTES BLD QL SMEAR: ABNORMAL
EOSINOPHIL # BLD: 0.1 K/UL (ref 0–0.6)
EOSINOPHIL NFR BLD: 1 % (ref 0–5)
ERYTHROCYTE [DISTWIDTH] IN BLOOD BY AUTOMATED COUNT: 32.3 % (ref 11.5–14.5)
GLUCOSE BLD-MCNC: 261 MG/DL (ref 70–99)
GLUCOSE BLD-MCNC: 299 MG/DL (ref 70–99)
GLUCOSE BLD-MCNC: 300 MG/DL (ref 70–99)
GLUCOSE BLD-MCNC: 332 MG/DL (ref 70–99)
GLUCOSE SERPL-MCNC: 218 MG/DL (ref 74–109)
HCT VFR BLD AUTO: 30.3 % (ref 37–47)
HGB BLD-MCNC: 8.5 G/DL (ref 12–16)
HYPOCHROMIA BLD QL SMEAR: ABNORMAL
IMM GRANULOCYTES # BLD: 0.2 K/UL
LYMPHOCYTES # BLD: 1.7 K/UL (ref 1.1–4.5)
LYMPHOCYTES NFR BLD: 11.7 % (ref 20–40)
MCH RBC QN AUTO: 22.6 PG (ref 27–31)
MCHC RBC AUTO-ENTMCNC: 28.1 G/DL (ref 33–37)
MCV RBC AUTO: 80.6 FL (ref 81–99)
MICROCYTES BLD QL SMEAR: ABNORMAL
MONOCYTES # BLD: 1 K/UL (ref 0–0.9)
MONOCYTES NFR BLD: 6.6 % (ref 0–10)
NEUTROPHILS # BLD: 11.4 K/UL (ref 1.5–7.5)
NEUTS SEG NFR BLD: 79.2 % (ref 50–65)
OVALOCYTES BLD QL SMEAR: ABNORMAL
PERFORMED ON: ABNORMAL
PLATELET # BLD AUTO: 293 K/UL (ref 130–400)
PLATELET SLIDE REVIEW: ADEQUATE
PMV BLD AUTO: 10.7 FL (ref 9.4–12.3)
POIKILOCYTOSIS BLD QL SMEAR: ABNORMAL
POLYCHROMASIA BLD QL SMEAR: ABNORMAL
POTASSIUM SERPL-SCNC: 4.6 MMOL/L (ref 3.5–5)
RBC # BLD AUTO: 3.76 M/UL (ref 4.2–5.4)
SODIUM SERPL-SCNC: 130 MMOL/L (ref 136–145)
WBC # BLD AUTO: 14.4 K/UL (ref 4.8–10.8)

## 2023-08-22 PROCEDURE — 97116 GAIT TRAINING THERAPY: CPT

## 2023-08-22 PROCEDURE — 6370000000 HC RX 637 (ALT 250 FOR IP): Performed by: HOSPITALIST

## 2023-08-22 PROCEDURE — 6360000002 HC RX W HCPCS: Performed by: INTERNAL MEDICINE

## 2023-08-22 PROCEDURE — 2580000003 HC RX 258: Performed by: HOSPITALIST

## 2023-08-22 PROCEDURE — 2580000003 HC RX 258: Performed by: INTERNAL MEDICINE

## 2023-08-22 PROCEDURE — 82962 GLUCOSE BLOOD TEST: CPT

## 2023-08-22 PROCEDURE — 36415 COLL VENOUS BLD VENIPUNCTURE: CPT

## 2023-08-22 PROCEDURE — 85025 COMPLETE CBC W/AUTO DIFF WBC: CPT

## 2023-08-22 PROCEDURE — 80048 BASIC METABOLIC PNL TOTAL CA: CPT

## 2023-08-22 PROCEDURE — 1210000000 HC MED SURG R&B

## 2023-08-22 PROCEDURE — 97110 THERAPEUTIC EXERCISES: CPT

## 2023-08-22 PROCEDURE — 6370000000 HC RX 637 (ALT 250 FOR IP): Performed by: INTERNAL MEDICINE

## 2023-08-22 RX ADMIN — MICONAZOLE NITRATE: 2 POWDER TOPICAL at 10:13

## 2023-08-22 RX ADMIN — AZTREONAM 1000 MG: 1 INJECTION, POWDER, LYOPHILIZED, FOR SOLUTION INTRAMUSCULAR; INTRAVENOUS at 23:05

## 2023-08-22 RX ADMIN — AZTREONAM 1000 MG: 1 INJECTION, POWDER, LYOPHILIZED, FOR SOLUTION INTRAMUSCULAR; INTRAVENOUS at 17:30

## 2023-08-22 RX ADMIN — METOPROLOL TARTRATE 100 MG: 50 TABLET, FILM COATED ORAL at 10:00

## 2023-08-22 RX ADMIN — INSULIN GLARGINE 15 UNITS: 100 INJECTION, SOLUTION SUBCUTANEOUS at 20:44

## 2023-08-22 RX ADMIN — INSULIN LISPRO 3 UNITS: 100 INJECTION, SOLUTION INTRAVENOUS; SUBCUTANEOUS at 12:43

## 2023-08-22 RX ADMIN — SODIUM CHLORIDE, PRESERVATIVE FREE 10 ML: 5 INJECTION INTRAVENOUS at 10:00

## 2023-08-22 RX ADMIN — LINEZOLID 600 MG: 600 INJECTION, SOLUTION INTRAVENOUS at 06:25

## 2023-08-22 RX ADMIN — INSULIN LISPRO 2 UNITS: 100 INJECTION, SOLUTION INTRAVENOUS; SUBCUTANEOUS at 09:59

## 2023-08-22 RX ADMIN — AZTREONAM 1000 MG: 1 INJECTION, POWDER, LYOPHILIZED, FOR SOLUTION INTRAMUSCULAR; INTRAVENOUS at 06:19

## 2023-08-22 RX ADMIN — LINEZOLID 600 MG: 600 INJECTION, SOLUTION INTRAVENOUS at 18:09

## 2023-08-22 RX ADMIN — MICONAZOLE NITRATE: 2 POWDER TOPICAL at 20:41

## 2023-08-22 RX ADMIN — Medication 10 MG: at 20:41

## 2023-08-22 RX ADMIN — SODIUM CHLORIDE, PRESERVATIVE FREE 10 ML: 5 INJECTION INTRAVENOUS at 20:49

## 2023-08-22 RX ADMIN — INSULIN LISPRO 3 UNITS: 100 INJECTION, SOLUTION INTRAVENOUS; SUBCUTANEOUS at 17:29

## 2023-08-22 RX ADMIN — METOPROLOL TARTRATE 50 MG: 50 TABLET, FILM COATED ORAL at 17:30

## 2023-08-22 ASSESSMENT — ENCOUNTER SYMPTOMS
SHORTNESS OF BREATH: 0
RHINORRHEA: 0
NAUSEA: 0
VOMITING: 0
CONSTIPATION: 0
DIARRHEA: 0
BACK PAIN: 0
COLOR CHANGE: 0
COUGH: 0
VOICE CHANGE: 0

## 2023-08-22 NOTE — CARE COORDINATION
Norman Godwin offered; precert initiated.   [address] [place] [address] and Rehab  [phone] p  [phone] f  Electronically signed by AMANDA Rader on 8/22/2023 at 1:45 PM

## 2023-08-22 NOTE — PROGRESS NOTES
23 1130   Encounter Summary   Encounter Overview/Reason  Spiritual/Emotional Needs   Service Provided For: Patient  (Patient has 3 adult children who call on regular basis but \"my son visited Saturday and I don't even remember. \")   Support System Children;Religious/jelani community  (Patient has 3 children, my oldest is a  in [facility]. Patient has support of [place] [address] with Nick Shona Yana Dania has called my son\" but patient states \"I don't know nothing\" was not comprehending and says \"I have a UTI\")   Complexity of Encounter Low  (Patient \"I feel a lot better\" and Arielle Cousin Leandra Doran is awesome when she gave me a bath\" patient states \"so many days I missed\" patient continues to give kudos \"I worked in [address]\" facility and Trerashaun Andreea is awesome\". )   Begin Time 1135   End Time  1239   Total Time Calculated 64 min   Spiritual/Emotional needs   Type Emotional Distress;Spiritual Support  (Patient hope \"to go home\" and \"they sent someone in to talk about rehab but my son said we will see about that. \" Patient's spouse  \"massive heartattack\" not expected \"2012\")   Grief, Loss, and Adjustments   Type Adjustment to illness  (Making progress today \"i even remember what I ate\" as no memory since admission \"my girl friend says i came in on Wednesday. \")   Advance Care Planning   Type ACP conversation  (Patient thinking clear today and able to make wishes known.  called son, Carol Chaparro who confirmed willingness to be primary HCDM with his sister, Roxnana White as secondary. See ACP note)   Assessment/Intervention/Outcome   Assessment Calm; Other (Comment)  (\"I know things today and feel a lot better\" Today patient \"clear headed better than I have been in days\"Patient seeks assist with AD.)   Intervention Active listening;Discussed meaning/purpose;Sustaining Presence/Ministry of presence  (ACP conversation and completion of AD with assist from phone call to son HCDM)   Outcome

## 2023-08-22 NOTE — PROGRESS NOTES
Hospitalist Progress Note    Patient:  Queenie Lowry  YOB: 1955  Date of Service: 8/22/2023  MRN: [id number]   Acct: [de-identified]   Primary Care Physician: Faustina Hammer MD  Advance Directive: Full Code  Admit Date: 8/16/2023       Hospital Day: 6  Referring Provider: Nichole Ruelas DO    Patient Seen, Chart, Consults, Notes, Labs, Radiology studies reviewed. Subjective:  Queenie Lowry is a 79 y.o. female  whom we are following for iron deficiency anemia abscess and phlebitis in the left antecubital fossa. Blood cultures have grown out a staph species and gram-positive lou. She is feeling significantly better. She is much more awake and alert now.     Allergies:  Cephalexin, Codeine, Other, Aspirin, Gabapentin, Keppra [levetiracetam], Meloxicam, Morphine, Sulfa antibiotics, Vancomycin, Zyrtec [cetirizine], Levofloxacin, and Penicillins    Medicines:  Current Facility-Administered Medications   Medication Dose Route Frequency Provider Last Rate Last Admin    aztreonam (AZACTAM) 1,000 mg in sterile water 10 mL IV syringe  1,000 mg IntraVENous Q8H Nichole Ruelas, DO   1,000 mg at 08/22/23 1730    linezolid (ZYVOX) IVPB 600 mg  600 mg IntraVENous Q12H Nichole Ruelas, DO   Stopped at 08/22/23 5772    glucagon injection 1 mg  1 mg SubCUTAneous PRN Nichole Ruelas, DO        dextrose 10 % infusion   IntraVENous Continuous PRN Nichole Ruelas, DO        glucose chewable tablet 16 g  4 tablet Oral PRN Nichole Ruelas, DO        dextrose bolus 10% 125 mL  125 mL IntraVENous PRN Nichole Ruelas, DO        Or    dextrose bolus 10% 250 mL  250 mL IntraVENous PRN Nichole Ruelas, DO        dextrose 10 % infusion   IntraVENous Continuous PRN Nichole Ruelas, DO        insulin glargine (LANTUS) injection vial 15 Units  0.15 Units/kg SubCUTAneous Nightly Nichole Ruelas, DO   15 Units at 08/21/23 2159    metoprolol tartrate (LOPRESSOR) tablet 100 mg  100 mg Oral age 17    CHOLECYSTECTOMY, LAPAROSCOPIC N/A     HYSTERECTOMY, TOTAL ABDOMINAL (CERVIX REMOVED) N/A     LEG TENOLYSIS Left     Lipoma remova    TUBAL LIGATION Bilateral 1991-1992       Family History  Family History   Problem Relation Age of Onset    Coronary Art Dis Mother         Second Hand Smoker    Coronary Art Dis Father         SMOKER    Crohn's Disease Sister     Coronary Art Dis Brother         SMOKER    Hypertension Son     Depression Son     Fibromyalgia Daughter        Social History  Social History     Socioeconomic History    Marital status:      Spouse name: Not on file    Number of children: 3    Years of education: Not on file    Highest education level: Not on file   Occupational History    Occupation: [employer]     Comment: worked in [employer]    Smoking status: Former     Packs/day: 0.50     Years: 23.00     Pack years: 11.50     Types: Cigarettes     Start date:      Quit date:      Years since quittin.6    Smokeless tobacco: Former    Tobacco comments:     Started at age 17 (5), quit in 56 (at age 46), smoked 1/2 PPD for 23 years   Vaping Use    Vaping Use: Never used   Substance and Sexual Activity    Alcohol use: Not Currently     Comment: \"back when i was younger\"    Drug use: Never    Sexual activity: Not on file     Comment: has 3 kids   Other Topics Concern    Not on file   Social History Narrative    CODE STATUS: Full Code    HEALTH CARE PROXY: Mr. Laura Monique, her son, [phone]    AMBULATES: uses a walker the last few days    DOMICILED: has 2 steps to reach her door, has no stairs inside home, lives alone, lives with  and they dhrhas a dog     Social Determinants of Health     Financial Resource Strain: Low Risk     Difficulty of Paying Living Expenses: Not hard at all   Food Insecurity: Unknown    Worried About [place] in the Last Year: Never true    [address] in the Last Year: Not on file

## 2023-08-22 NOTE — ACP (ADVANCE CARE PLANNING)
Advance Care Planning     Advance Care Planning Inpatient Note  [facility] Department    Today's Date: 8/22/2023  Unit: [facility]/MED    Received request from patient. Upon review of chart and communication with care team, patient's decision making abilities are not in question. . Patient was/were present in the room during visit. Phone call made to son, Nguyen Benites from patient room. Goals of ACP Conversation:  Discuss advance care planning documents  Facilitate a discussion related to patient's goals of care as they align with the patient's values and beliefs. Health Care Decision Makers:       Primary Decision Maker: Pavan Weber Child - [phone]    Secondary Decision Maker: Rona Garcia  Child - [phone]  Summary: Patient has 3 adult children but makes choice to leave La[place] Self uninvolved \"he would never make the decision to take me off a machine\" adds Tesfaye Khan is the baby, 39years old. \" Also notes \"he lives 4 hours away in [place]" Patient oldest son is EMT and Derl [employer] and knows mother/patient and what she wishes. Completed [address] [facility]    Advance Care Planning Documents (Patient Wishes):  Living Will/Advance Directive     Assessment:  Patient speaks with ease and openness regarding her hopes/wishes and the relationship she has with her children. Patient had  3x  \"Nataly's dad is still alive and they are in contact\" (Legally . Patient's two marriages each ended with spouse dying. Patient familiar with death \"my mom, my dad, my brother\" and \"my oldest sister still alive.    Interventions:  Provided education on documents for clarity and greater understanding  Discussed and provided education on state decision maker hierarchy  Assisted in the completion of documents according to patient's wishes at this time  Encouraged ongoing ACP conversation with future decision makers and loved ones    Care Preferences

## 2023-08-22 NOTE — PLAN OF CARE
Problem: Discharge Planning  Goal: Discharge to home or other facility with appropriate resources  8/22/2023 1439 by Daryl Purvis RN  Outcome: Progressing  8/22/2023 0312 by Cristina Tijerina  Outcome: Progressing     Problem: Skin/Tissue Integrity  Goal: Absence of new skin breakdown  Description: 1. Monitor for areas of redness and/or skin breakdown  2. Assess vascular access sites hourly  3. Every 4-6 hours minimum:  Change oxygen saturation probe site  4. Every 4-6 hours:  If on nasal continuous positive airway pressure, respiratory therapy assess nares and determine need for appliance change or resting period.   8/22/2023 1439 by Daryl Purvis RN  Outcome: Progressing  8/22/2023 0312 by Cristina Tijerina  Outcome: Progressing     Problem: ABCDS Injury Assessment  Goal: Absence of physical injury  8/22/2023 1439 by Daryl Purvis RN  Outcome: Progressing  8/22/2023 0312 by Cristina Tijerina  Outcome: Progressing     Problem: Pain  Goal: Verbalizes/displays adequate comfort level or baseline comfort level  8/22/2023 1439 by Daryl Purvis RN  Outcome: Progressing  8/22/2023 0312 by Cristina Tijerina  Outcome: Progressing     Problem: Safety - Adult  Goal: Free from fall injury  8/22/2023 1439 by Daryl Purvis RN  Outcome: Progressing  8/22/2023 0312 by Cristina Tijerina  Outcome: Progressing     Problem: Nutrition Deficit:  Goal: Optimize nutritional status  8/22/2023 1439 by Daryl Purvis RN  Outcome: Progressing  8/22/2023 0312 by Cristina Tijerina  Outcome: Progressing

## 2023-08-22 NOTE — PROGRESS NOTES
08/22/23 1400   Subjective   Subjective I have to use the BR. Pain Assessment   Pain Assessment None - Denies Pain   Cognition   Overall Cognitive Status WFL   Orientation   Overall Orientation Status WNL   Vitals   O2 Device None (Room air)   Bed Mobility Training   Bed Mobility Training Yes   Supine to Sit Minimum assistance   Scooting Stand-by assistance  (EOB)   Balance   Sitting Intact  (EOB 10-15 minutes  for EX & donning gown.)   Standing With support  (RW)   Transfer Training   Sit to Stand Minimum assistance   Stand to Sit Contact-guard assistance   Bed to Chair Contact-guard assistance  [facility])   Gait Training   Gait Training Yes   Gait   Overall Level of Assistance Contact-guard assistance   Speed/Vilma Slow   Distance (ft) 5 Feet  (To BSC)   Assistive Device Walker, rolling   PT Exercises   A/AROM Exercises BL LE EX in sitting  x15  reps   Other Specialty Interventions   Other Treatments/Modalities On BSC with call light instructed to use when done.    Assessment   Activity Tolerance Patient tolerated treatment well   PT Plan of Care   Tuesday X       Electronically signed by Walt Parks PTA on 8/22/2023 at 2:41 PM

## 2023-08-23 LAB
ACANTHOCYTES BLD QL SMEAR: ABNORMAL
ANION GAP SERPL CALCULATED.3IONS-SCNC: 12 MMOL/L (ref 7–19)
AT III AG ACT/NOR PPP IA: 54 % (ref 82–136)
BACTERIA BLD CULT: ABNORMAL
BACTERIA BLD CULT: ABNORMAL
BASOPHILS # BLD: 0 K/UL (ref 0–0.2)
BASOPHILS NFR BLD: 0.4 % (ref 0–1)
BUN SERPL-MCNC: 14 MG/DL (ref 8–23)
BURR CELLS BLD QL SMEAR: ABNORMAL
CALCIUM SERPL-MCNC: 7.5 MG/DL (ref 8.8–10.2)
CHLORIDE SERPL-SCNC: 99 MMOL/L (ref 98–111)
CO2 SERPL-SCNC: 20 MMOL/L (ref 22–29)
CREAT SERPL-MCNC: 0.5 MG/DL (ref 0.5–0.9)
EOSINOPHIL # BLD: 0.3 K/UL (ref 0–0.6)
EOSINOPHIL NFR BLD: 2.9 % (ref 0–5)
ERYTHROCYTE [DISTWIDTH] IN BLOOD BY AUTOMATED COUNT: 32.8 % (ref 11.5–14.5)
GLUCOSE BLD-MCNC: 182 MG/DL (ref 70–99)
GLUCOSE BLD-MCNC: 227 MG/DL (ref 70–99)
GLUCOSE BLD-MCNC: 247 MG/DL (ref 70–99)
GLUCOSE BLD-MCNC: 293 MG/DL (ref 70–99)
GLUCOSE SERPL-MCNC: 187 MG/DL (ref 74–109)
HCT VFR BLD AUTO: 31.6 % (ref 37–47)
HGB BLD-MCNC: 8.2 G/DL (ref 12–16)
HYPOCHROMIA BLD QL SMEAR: ABNORMAL
IMM GRANULOCYTES # BLD: 0.1 K/UL
LYMPHOCYTES # BLD: 2 K/UL (ref 1.1–4.5)
LYMPHOCYTES NFR BLD: 21.3 % (ref 20–40)
MCH RBC QN AUTO: 22.7 PG (ref 27–31)
MCHC RBC AUTO-ENTMCNC: 25.9 G/DL (ref 33–37)
MCV RBC AUTO: 87.5 FL (ref 81–99)
MONOCYTES # BLD: 0.7 K/UL (ref 0–0.9)
MONOCYTES NFR BLD: 6.9 % (ref 0–10)
NEUTROPHILS # BLD: 6.4 K/UL (ref 1.5–7.5)
NEUTS SEG NFR BLD: 67.5 % (ref 50–65)
ORGANISM: ABNORMAL
OVALOCYTES BLD QL SMEAR: ABNORMAL
PERFORMED ON: ABNORMAL
PLATELET # BLD AUTO: 250 K/UL (ref 130–400)
PLATELET SLIDE REVIEW: ADEQUATE
PMV BLD AUTO: 11.1 FL (ref 9.4–12.3)
POLYCHROMASIA BLD QL SMEAR: ABNORMAL
POTASSIUM SERPL-SCNC: 3.9 MMOL/L (ref 3.5–5)
RBC # BLD AUTO: 3.61 M/UL (ref 4.2–5.4)
SODIUM SERPL-SCNC: 131 MMOL/L (ref 136–145)
WBC # BLD AUTO: 9.4 K/UL (ref 4.8–10.8)

## 2023-08-23 PROCEDURE — 99231 SBSQ HOSP IP/OBS SF/LOW 25: CPT | Performed by: INTERNAL MEDICINE

## 2023-08-23 PROCEDURE — APPSS15 APP SPLIT SHARED TIME 0-15 MINUTES: Performed by: NURSE PRACTITIONER

## 2023-08-23 PROCEDURE — 85025 COMPLETE CBC W/AUTO DIFF WBC: CPT

## 2023-08-23 PROCEDURE — 97110 THERAPEUTIC EXERCISES: CPT

## 2023-08-23 PROCEDURE — 1210000000 HC MED SURG R&B

## 2023-08-23 PROCEDURE — 6360000002 HC RX W HCPCS: Performed by: INTERNAL MEDICINE

## 2023-08-23 PROCEDURE — 97116 GAIT TRAINING THERAPY: CPT

## 2023-08-23 PROCEDURE — 6370000000 HC RX 637 (ALT 250 FOR IP): Performed by: INTERNAL MEDICINE

## 2023-08-23 PROCEDURE — 82962 GLUCOSE BLOOD TEST: CPT

## 2023-08-23 PROCEDURE — 6370000000 HC RX 637 (ALT 250 FOR IP): Performed by: HOSPITALIST

## 2023-08-23 PROCEDURE — 2580000003 HC RX 258: Performed by: INTERNAL MEDICINE

## 2023-08-23 PROCEDURE — 80048 BASIC METABOLIC PNL TOTAL CA: CPT

## 2023-08-23 PROCEDURE — 94760 N-INVAS EAR/PLS OXIMETRY 1: CPT

## 2023-08-23 PROCEDURE — 36415 COLL VENOUS BLD VENIPUNCTURE: CPT

## 2023-08-23 RX ADMIN — INSULIN LISPRO 2 UNITS: 100 INJECTION, SOLUTION INTRAVENOUS; SUBCUTANEOUS at 11:55

## 2023-08-23 RX ADMIN — METOPROLOL TARTRATE 50 MG: 50 TABLET, FILM COATED ORAL at 17:47

## 2023-08-23 RX ADMIN — INSULIN LISPRO 1 UNITS: 100 INJECTION, SOLUTION INTRAVENOUS; SUBCUTANEOUS at 17:47

## 2023-08-23 RX ADMIN — INSULIN GLARGINE 15 UNITS: 100 INJECTION, SOLUTION SUBCUTANEOUS at 20:35

## 2023-08-23 RX ADMIN — MICONAZOLE NITRATE: 2 POWDER TOPICAL at 19:46

## 2023-08-23 RX ADMIN — METOPROLOL TARTRATE 100 MG: 50 TABLET, FILM COATED ORAL at 08:47

## 2023-08-23 RX ADMIN — LINEZOLID 600 MG: 600 INJECTION, SOLUTION INTRAVENOUS at 06:16

## 2023-08-23 RX ADMIN — AZTREONAM 1000 MG: 1 INJECTION, POWDER, LYOPHILIZED, FOR SOLUTION INTRAMUSCULAR; INTRAVENOUS at 06:15

## 2023-08-23 RX ADMIN — MICONAZOLE NITRATE: 2 POWDER TOPICAL at 08:47

## 2023-08-23 RX ADMIN — Medication 10 MG: at 19:45

## 2023-08-23 NOTE — CARE COORDINATION
Gracie Melgar has precert  [facility]  [phone] p  [phone] f  Electronically signed by AMANDA Galaviz on 8/23/2023 at 3:01 PM    Pt has changed her mind and wants to d/c home instead of SNF; RN and SW attempting calls to Pt's son and dtr to verify who can pick her up and what the plan is.   Electronically signed by AMANDA Galaviz on 8/23/2023 at 3:58 PM

## 2023-08-23 NOTE — PROGRESS NOTES
08/23/23 1400   Subjective   Subjective I am just tired. Pain Assessment   Pain Assessment None - Denies Pain   Cognition   Overall Cognitive Status WFL   Orientation   Overall Orientation Status WNL   Vitals   O2 Device None (Room air)   Bed Mobility Training   Supine to Sit Stand-by assistance   Scooting Stand-by assistance   Balance   Sitting Intact   Transfer Training   Sit to Stand Stand-by assistance;Contact-guard assistance   Stand to Sit Contact-guard assistance   Gait Training   Gait Training Yes   Gait   Overall Level of Assistance Contact-guard assistance   Speed/Vilma Slow   Distance (ft) 60 Feet  (Three brief rest stops with gait.)   Assistive Device Walker, rolling   PT Exercises   A/AROM Exercises BL LE EX in sitting  x15  reps   Other Specialty Interventions   Other Treatments/Modalities In chair needs in reach personal alarm attached.    PT Plan of Care   Wednesday X       Electronically signed by Maggie Mina PTA on 8/23/2023 at 2:20 PM

## 2023-08-23 NOTE — PLAN OF CARE
Problem: Discharge Planning  Goal: Discharge to home or other facility with appropriate resources  8/23/2023 0335 by Catalina Henderson RN  Outcome: Progressing  8/22/2023 2220 by Catalina Henderson RN  Outcome: Progressing  Flowsheets (Taken 8/22/2023 1900)  Discharge to home or other facility with appropriate resources:   Identify barriers to discharge with patient and caregiver   Arrange for needed discharge resources and transportation as appropriate   Identify discharge learning needs (meds, wound care, etc)   Arrange for interpreters to assist at discharge as needed  8/22/2023 1439 by Gilbert Ferrer RN  Outcome: Progressing     Problem: Skin/Tissue Integrity  Goal: Absence of new skin breakdown  Description: 1. Monitor for areas of redness and/or skin breakdown  2. Assess vascular access sites hourly  3. Every 4-6 hours minimum:  Change oxygen saturation probe site  4. Every 4-6 hours:  If on nasal continuous positive airway pressure, respiratory therapy assess nares and determine need for appliance change or resting period.   8/23/2023 0335 by Catalina Henderson RN  Outcome: Progressing  8/22/2023 2220 by Catalina Henderson RN  Outcome: Progressing  8/22/2023 1439 by Gilbert Ferrer RN  Outcome: Progressing     Problem: ABCDS Injury Assessment  Goal: Absence of physical injury  8/23/2023 0335 by Catalina Henderson RN  Outcome: Progressing  8/22/2023 2220 by Catalina Henderson RN  Outcome: Progressing  Flowsheets (Taken 8/22/2023 2151)  Absence of Physical Injury: Implement safety measures based on patient assessment  8/22/2023 1439 by Gilbert Ferrer RN  Outcome: Progressing     Problem: Pain  Goal: Verbalizes/displays adequate comfort level or baseline comfort level  8/23/2023 0335 by Catalina Henderson RN  Outcome: Progressing  8/22/2023 2220 by Catalina Henderson RN  Outcome: Progressing  8/22/2023 1439 by Gilbert Ferrer RN  Outcome: Progressing     Problem: Safety - Adult  Goal: Free from fall injury  8/23/2023 1194 by Porsha Hardwick RN  Outcome: Progressing  8/22/2023 2220 by Porsha Hardwick RN  Outcome: Progressing  Flowsheets (Taken 8/22/2023 2151)  Free From Fall Injury:   Instruct family/caregiver on patient safety   Based on caregiver fall risk screen, instruct family/caregiver to ask for assistance with transferring infant if caregiver noted to have fall risk factors  8/22/2023 1439 by Arely Pratt RN  Outcome: Progressing     Problem: Nutrition Deficit:  Goal: Optimize nutritional status  8/23/2023 0335 by Porsha Hardwick RN  Outcome: Progressing  8/22/2023 2220 by Porsah Hardwick RN  Outcome: Progressing  8/22/2023 1439 by Arely Pratt RN  Outcome: Progressing     Problem: Neurosensory - Adult  Goal: Achieves stable or improved neurological status  Outcome: Progressing  Goal: Absence of seizures  Outcome: Progressing  Goal: Remains free of injury related to seizures activity  Outcome: Progressing  Goal: Achieves maximal functionality and self care  Outcome: Progressing     Problem: Skin/Tissue Integrity - Adult  Goal: Skin integrity remains intact  Outcome: Progressing  Flowsheets  Taken 8/22/2023 2151  Skin Integrity Remains Intact:   Monitor for areas of redness and/or skin breakdown   Assess vascular access sites hourly  Taken 8/22/2023 1900  Skin Integrity Remains Intact:   Assess vascular access sites hourly   Monitor for areas of redness and/or skin breakdown  Goal: Incisions, wounds, or drain sites healing without S/S of infection  Outcome: Progressing  Goal: Oral mucous membranes remain intact  Outcome: Progressing     Problem: Gastrointestinal - Adult  Goal: Minimal or absence of nausea and vomiting  Outcome: Progressing  Goal: Maintains or returns to baseline bowel function  Outcome: Progressing  Goal: Maintains adequate nutritional intake  Outcome: Progressing  Goal: Establish and maintain optimal ostomy function  Outcome: Progressing     Problem: Genitourinary - Adult  Goal: Absence of

## 2023-08-23 NOTE — PLAN OF CARE
Patient calm and cooperative with care. Safety and education maintained throughout the shift. Patient up to bedside commode throughout shift several times. A & O x 4, but can be intermittently confused about time.

## 2023-08-24 VITALS
OXYGEN SATURATION: 99 % | RESPIRATION RATE: 16 BRPM | HEIGHT: 60 IN | WEIGHT: 228.6 LBS | BODY MASS INDEX: 44.88 KG/M2 | TEMPERATURE: 97.5 F | SYSTOLIC BLOOD PRESSURE: 158 MMHG | HEART RATE: 70 BPM | DIASTOLIC BLOOD PRESSURE: 76 MMHG

## 2023-08-24 LAB
ANION GAP SERPL CALCULATED.3IONS-SCNC: 13 MMOL/L (ref 7–19)
ANISOCYTOSIS BLD QL SMEAR: ABNORMAL
BACTERIA CATH TIP CULT: ABNORMAL
BACTERIA SPEC ANAEROBE CULT: ABNORMAL
BASOPHILS # BLD: 0 K/UL (ref 0–0.2)
BASOPHILS NFR BLD: 0.4 % (ref 0–1)
BUN SERPL-MCNC: 9 MG/DL (ref 8–23)
BURR CELLS BLD QL SMEAR: ABNORMAL
BURR CELLS BLD QL SMEAR: ABNORMAL
CALCIUM SERPL-MCNC: 8.3 MG/DL (ref 8.8–10.2)
CHLORIDE SERPL-SCNC: 101 MMOL/L (ref 98–111)
CO2 SERPL-SCNC: 23 MMOL/L (ref 22–29)
CREAT SERPL-MCNC: 0.4 MG/DL (ref 0.5–0.9)
DACRYOCYTES BLD QL SMEAR: ABNORMAL
EOSINOPHIL # BLD: 0.2 K/UL (ref 0–0.6)
EOSINOPHIL NFR BLD: 2.6 % (ref 0–5)
ERYTHROCYTE [DISTWIDTH] IN BLOOD BY AUTOMATED COUNT: 33.4 % (ref 11.5–14.5)
GLUCOSE BLD-MCNC: 148 MG/DL (ref 70–99)
GLUCOSE BLD-MCNC: 297 MG/DL (ref 70–99)
GLUCOSE SERPL-MCNC: 170 MG/DL (ref 74–109)
HCT VFR BLD AUTO: 34.2 % (ref 37–47)
HGB BLD-MCNC: 9 G/DL (ref 12–16)
HYPOCHROMIA BLD QL SMEAR: ABNORMAL
IMM GRANULOCYTES # BLD: 0 K/UL
LYMPHOCYTES # BLD: 2 K/UL (ref 1.1–4.5)
LYMPHOCYTES NFR BLD: 24.9 % (ref 20–40)
MAGNESIUM SERPL-MCNC: 2.1 MG/DL (ref 1.6–2.4)
MCH RBC QN AUTO: 22.4 PG (ref 27–31)
MCHC RBC AUTO-ENTMCNC: 26.3 G/DL (ref 33–37)
MCV RBC AUTO: 85.1 FL (ref 81–99)
MICROCYTES BLD QL SMEAR: ABNORMAL
MONOCYTES # BLD: 0.6 K/UL (ref 0–0.9)
MONOCYTES NFR BLD: 7.2 % (ref 0–10)
NEUTROPHILS # BLD: 5.1 K/UL (ref 1.5–7.5)
NEUTS SEG NFR BLD: 64.4 % (ref 50–65)
ORGANISM: ABNORMAL
OVALOCYTES BLD QL SMEAR: ABNORMAL
PERFORMED ON: ABNORMAL
PERFORMED ON: ABNORMAL
PLATELET # BLD AUTO: 219 K/UL (ref 130–400)
PLATELET SLIDE REVIEW: ADEQUATE
PMV BLD AUTO: 11.2 FL (ref 9.4–12.3)
POIKILOCYTOSIS BLD QL SMEAR: ABNORMAL
POLYCHROMASIA BLD QL SMEAR: ABNORMAL
POTASSIUM SERPL-SCNC: 3.9 MMOL/L (ref 3.5–5)
PROT S ACT/NOR PPP: 28 % (ref 57–131)
RBC # BLD AUTO: 4.02 M/UL (ref 4.2–5.4)
SODIUM SERPL-SCNC: 137 MMOL/L (ref 136–145)
TARGETS BLD QL SMEAR: ABNORMAL
WBC # BLD AUTO: 8 K/UL (ref 4.8–10.8)

## 2023-08-24 PROCEDURE — 83735 ASSAY OF MAGNESIUM: CPT

## 2023-08-24 PROCEDURE — 36415 COLL VENOUS BLD VENIPUNCTURE: CPT

## 2023-08-24 PROCEDURE — 94760 N-INVAS EAR/PLS OXIMETRY 1: CPT

## 2023-08-24 PROCEDURE — 85025 COMPLETE CBC W/AUTO DIFF WBC: CPT

## 2023-08-24 PROCEDURE — 82962 GLUCOSE BLOOD TEST: CPT

## 2023-08-24 PROCEDURE — 80048 BASIC METABOLIC PNL TOTAL CA: CPT

## 2023-08-24 PROCEDURE — 6370000000 HC RX 637 (ALT 250 FOR IP): Performed by: INTERNAL MEDICINE

## 2023-08-24 RX ORDER — LEVOFLOXACIN 500 MG/1
500 TABLET, FILM COATED ORAL DAILY
Qty: 7 TABLET | Refills: 0 | Status: SHIPPED | OUTPATIENT
Start: 2023-08-24 | End: 2023-08-31

## 2023-08-24 RX ADMIN — MICONAZOLE NITRATE: 2 POWDER TOPICAL at 08:59

## 2023-08-24 RX ADMIN — INSULIN LISPRO 2 UNITS: 100 INJECTION, SOLUTION INTRAVENOUS; SUBCUTANEOUS at 13:37

## 2023-08-24 RX ADMIN — METOPROLOL TARTRATE 100 MG: 50 TABLET, FILM COATED ORAL at 08:58

## 2023-08-24 NOTE — DISCHARGE SUMMARY
Hospitalist Discharge Summary  [facility]    Patient: Elvi Orr  : 1955  MRN: [id number]  Code Status: Full Code  PCP: Gerhardt Siemens, MD  Attending: Chris Miller MD  Admission Date: 2023  Discharge Date: 2023    Discharge Medications:     Current Discharge Medication List             Details   levoFLOXacin (LEVAQUIN) 500 MG tablet Take 1 tablet by mouth daily for 7 days  Qty: 7 tablet, Refills: 0                Details   glipiZIDE (GLUCOTROL) 10 MG tablet Take 1 tablet by mouth 2 times daily (before meals)      !! metoprolol (LOPRESSOR) 100 MG tablet Take 0.5 tablets by mouth nightly      Semaglutide,0.25 or 0.5MG/DOS, (OZEMPIC, 0.25 OR 0.5 MG/DOSE,) 2 MG/1.5ML SOPN Inject into the skin every 7 days On Thursday      JARDIANCE 10 MG tablet Take 1 tablet by mouth daily      cloNIDine (CATAPRES) 0.2 MG tablet Take 1 tablet by mouth as needed (for SBP >160)      losartan (COZAAR) 100 MG tablet Take 25 mg by mouth daily Use to take 100mg,  Reduced to 25mg      !! metoprolol (LOPRESSOR) 100 MG tablet Take 1 tablet by mouth daily      montelukast (SINGULAIR) 10 MG tablet Take 1 tablet by mouth nightly      spironolactone (ALDACTONE) 25 MG tablet Take 2 tablets by mouth daily      atorvastatin (LIPITOR) 10 MG tablet at bedtime      furosemide (LASIX) 20 MG tablet Take 2 tablets by mouth daily as needed       !! - Potential duplicate medications found. Please discuss with provider. Discharge Instructions:   Recommended Follow Up:  [facility]  [address] [facility]  [phone]    Go on 2023  1:45 pm    [facility]  [address] [facility] [address]   Schedule an appointment as soon as possible for a visit  Inpatient GI recommended patient follow-up with GI as an outpatient to have video capsule endoscopy set up.     Gerhardt Siemens, MD  [address] [facility]

## 2023-08-24 NOTE — DISCHARGE INSTR - DIET
Good nutrition is important when healing from an illness, injury, or surgery. Follow any nutrition recommendations given to you during your hospital stay. If you were given an oral nutrition supplement while in the hospital, continue to take this supplement at home. You can take it with meals, in-between meals, and/or before bedtime. These supplements can be purchased at most local grocery stores, pharmacies, and chain [employer]-stores. If you have any questions about your diet or nutrition, call the hospital and ask for the dietitian. Resume pre-admission diet as tolerated.

## 2023-08-24 NOTE — CARE COORDINATION
08/24/23 1100   IMM Letter   IMM Letter given to Patient/Family/Significant other/Guardian/POA/by: SW gave letter to Pt and explained; didn't wish to wait 4h to d/c; AMANDA Judge   IMM Letter date given: 08/24/23   IMM Letter time given: 1100     Letter placed into soft chart  Electronically signed by AMANDA Solorzano on 8/24/2023 at 11:13 AM

## 2023-08-24 NOTE — PROGRESS NOTES
I\V removed @ 1430 and site noted to have scant light green, purulent drainage around insertion site. Cleaned IV insertion site with alcohol and covered with bandage. No active drainage since removal. Night shift notified to monitor closely for any additional drainage over night.

## 2023-08-24 NOTE — PLAN OF CARE
Problem: Discharge Planning  Goal: Discharge to home or other facility with appropriate resources  8/24/2023 1212 by Florinda Hartman RN  Outcome: Progressing  8/24/2023 0208 by Candice Whiting RN  Outcome: Progressing     Problem: Skin/Tissue Integrity  Goal: Absence of new skin breakdown  Description: 1. Monitor for areas of redness and/or skin breakdown  2. Assess vascular access sites hourly  3. Every 4-6 hours minimum:  Change oxygen saturation probe site  4. Every 4-6 hours:  If on nasal continuous positive airway pressure, respiratory therapy assess nares and determine need for appliance change or resting period.   8/24/2023 1212 by Florinda Hartman RN  Outcome: Progressing  8/24/2023 0208 by Candice Whiting RN  Outcome: Progressing     Problem: ABCDS Injury Assessment  Goal: Absence of physical injury  8/24/2023 1212 by Florinda Hartman RN  Outcome: Progressing  8/24/2023 0208 by Candice Whiting RN  Outcome: Progressing  Flowsheets (Taken 8/23/2023 2201)  Absence of Physical Injury: Implement safety measures based on patient assessment     Problem: Pain  Goal: Verbalizes/displays adequate comfort level or baseline comfort level  8/24/2023 1212 by Florinda Hartman RN  Outcome: Progressing  8/24/2023 0208 by Candice Whiting RN  Outcome: Progressing     Problem: Safety - Adult  Goal: Free from fall injury  8/24/2023 1212 by Florinda Hartman RN  Outcome: Progressing  8/24/2023 0208 by Candice Whiting RN  Outcome: Progressing  Flowsheets (Taken 8/23/2023 2201)  Free From Fall Injury:   Analilia Napoles family/caregiver on patient safety   Based on caregiver fall risk screen, instruct family/caregiver to ask for assistance with transferring infant if caregiver noted to have fall risk factors     Problem: Nutrition Deficit:  Goal: Optimize nutritional status  8/24/2023 1212 by Florinda Hartman RN  Outcome: Progressing  8/24/2023 0208 by Candice Whiting RN  Outcome: Progressing     Problem: Neurosensory - Adult  Goal: Progressing  Goal: Maintains or returns to baseline bowel function  8/24/2023 1212 by Melissa Hyde RN  Outcome: Progressing  8/24/2023 0208 by Porsha Hardwick RN  Outcome: Progressing  Goal: Maintains adequate nutritional intake  8/24/2023 1212 by Melissa Hyde RN  Outcome: Progressing  8/24/2023 0208 by Posrha Hardwick RN  Outcome: Progressing  Goal: Establish and maintain optimal ostomy function  8/24/2023 1212 by Melissa Hyde RN  Outcome: Progressing  8/24/2023 0208 by Porsha Hardwick RN  Outcome: Progressing     Problem: Genitourinary - Adult  Goal: Absence of urinary retention  8/24/2023 1212 by Melissa Hyde RN  Outcome: Progressing  8/24/2023 0208 by Porsha Hardwick RN  Outcome: Progressing  Goal: Urinary catheter remains patent  8/24/2023 1212 by Melissa Hyde RN  Outcome: Progressing  8/24/2023 0208 by Porsha Hardwick RN  Outcome: Progressing     Problem: Chronic Conditions and Co-morbidities  Goal: Patient's chronic conditions and co-morbidity symptoms are monitored and maintained or improved  8/24/2023 1212 by Melissa Hyde RN  Outcome: Progressing  8/24/2023 0208 by Porsha Hardwick RN  Outcome: Progressing

## 2023-08-24 NOTE — PLAN OF CARE
Problem: Discharge Planning  Goal: Discharge to home or other facility with appropriate resources  Outcome: Progressing     Problem: Skin/Tissue Integrity  Goal: Absence of new skin breakdown  Description: 1. Monitor for areas of redness and/or skin breakdown  2. Assess vascular access sites hourly  3. Every 4-6 hours minimum:  Change oxygen saturation probe site  4. Every 4-6 hours:  If on nasal continuous positive airway pressure, respiratory therapy assess nares and determine need for appliance change or resting period.   Outcome: Progressing     Problem: ABCDS Injury Assessment  Goal: Absence of physical injury  Outcome: Progressing     Problem: Pain  Goal: Verbalizes/displays adequate comfort level or baseline comfort level  Outcome: Progressing     Problem: Safety - Adult  Goal: Free from fall injury  Outcome: Progressing     Problem: Nutrition Deficit:  Goal: Optimize nutritional status  Outcome: Progressing     Problem: Neurosensory - Adult  Goal: Achieves stable or improved neurological status  Outcome: Progressing  Goal: Absence of seizures  Outcome: Progressing  Goal: Remains free of injury related to seizures activity  Outcome: Progressing  Goal: Achieves maximal functionality and self care  Outcome: Progressing     Problem: Skin/Tissue Integrity - Adult  Goal: Skin integrity remains intact  Outcome: Progressing  Goal: Incisions, wounds, or drain sites healing without S/S of infection  Outcome: Progressing  Goal: Oral mucous membranes remain intact  Outcome: Progressing     Problem: Gastrointestinal - Adult  Goal: Minimal or absence of nausea and vomiting  Outcome: Progressing  Goal: Maintains or returns to baseline bowel function  Outcome: Progressing  Goal: Maintains adequate nutritional intake  Outcome: Progressing  Goal: Establish and maintain optimal ostomy function  Outcome: Progressing     Problem: Genitourinary - Adult  Goal: Absence of urinary retention  Outcome: Progressing  Goal: Urinary

## 2023-08-25 LAB
APCR PPP: 4.35 {RATIO}
APTT IMM NP PPP: 39 SEC (ref 32–48)
APTT P HEP NEUT PPP: ABNORMAL SEC (ref 32–48)
AT III ACT/NOR PPP CHRO: 87 % (ref 76–128)
B2 GLYCOPROT1 IGG SERPL IA-ACNC: <10 SGU
B2 GLYCOPROT1 IGM SERPL IA-ACNC: <10 SMU
BACTERIA BLD CULT ORG #2: ABNORMAL
CARDIOLIPIN IGG SER IA-ACNC: <10 GPL
CARDIOLIPIN IGM SER IA-ACNC: <10 MPL
CONFIRM APTT STACLOT: ABNORMAL
DRVVT SCREEN TO CONFIRM RATIO: ABNORMAL RATIO
F2 C.20210G>A GENO BLD/T: NEGATIVE
F5 P.R506Q BLD/T QL: ABNORMAL
HCYS SERPL-SCNC: 15 UMOL/L (ref 0–15)
LA 3 SCREEN W REFLEX-IMP: ABNORMAL
LA NT DPL PPP QL: ABNORMAL
MIXING DRVVT: ABNORMAL SEC (ref 33–44)
PROT C ACT/NOR PPP: 28 % (ref 83–168)
PROT S FREE AG ACT/NOR PPP IA: 67 % (ref 55–123)
PROTHROMBIN TIME: 25.9 SEC (ref 12–15.5)
REPTILASE TIME: ABNORMAL SEC
SCREEN APTT: 51 SEC (ref 32–48)
SCREEN DRVVT: 34 SEC (ref 33–44)
SPECIMEN SOURCE: ABNORMAL
SPECIMEN SOURCE: ABNORMAL
THROMBIN TIME: 16.2 SEC (ref 14.7–19.5)
THROMBOSIS INTERPRETATION: ABNORMAL

## 2023-08-25 NOTE — CARE COORDINATION
Set up with [facility]  [phone].    Electronically signed by August Fuentes on 8/25/23 at 11:06 AM CDT

## 2023-09-11 ENCOUNTER — TELEPHONE (OUTPATIENT)
Dept: HEMATOLOGY | Age: 68
End: 2023-09-11

## 2023-09-11 NOTE — TELEPHONE ENCOUNTER
Called patient and reminded pt of their appt on 09/12/23. Patient stated she is in hospital in [address]. Transferred to  to reschedule appt.

## 2023-09-15 LAB
25(OH)D3 SERPL-MCNC: 38 NG/ML
ANISOCYTOSIS BLD QL SMEAR: ABNORMAL
BASOPHILS # BLD: 0.2 K/UL (ref 0–0.2)
BASOPHILS NFR BLD: 1.8 % (ref 0–1)
EOSINOPHIL # BLD: 0.4 K/UL (ref 0–0.6)
EOSINOPHIL NFR BLD: 4.9 % (ref 0–5)
ERYTHROCYTE [DISTWIDTH] IN BLOOD BY AUTOMATED COUNT: 28 % (ref 11.5–14.5)
FERRITIN SERPL-MCNC: 67.7 NG/ML (ref 13–150)
HCT VFR BLD AUTO: 40.7 % (ref 37–47)
HGB BLD-MCNC: 11.6 G/DL (ref 12–16)
HYPOCHROMIA BLD QL SMEAR: ABNORMAL
IMM GRANULOCYTES # BLD: 0 K/UL
IRON SATN MFR SERPL: 13 % (ref 14–50)
IRON SERPL-MCNC: 45 UG/DL (ref 37–145)
LYMPHOCYTES # BLD: 1.6 K/UL (ref 1.1–4.5)
LYMPHOCYTES NFR BLD: 18.2 % (ref 20–40)
MCH RBC QN AUTO: 23.2 PG (ref 27–31)
MCHC RBC AUTO-ENTMCNC: 28.5 G/DL (ref 33–37)
MCV RBC AUTO: 81.4 FL (ref 81–99)
MONOCYTES # BLD: 0.8 K/UL (ref 0–0.9)
MONOCYTES NFR BLD: 8.4 % (ref 0–10)
NEUTROPHILS # BLD: 6 K/UL (ref 1.5–7.5)
NEUTS SEG NFR BLD: 66.3 % (ref 50–65)
OVALOCYTES BLD QL SMEAR: ABNORMAL
PLATELET # BLD AUTO: 445 K/UL (ref 130–400)
PLATELET SLIDE REVIEW: ABNORMAL
PMV BLD AUTO: 10.2 FL (ref 9.4–12.3)
RBC # BLD AUTO: 5 M/UL (ref 4.2–5.4)
TIBC SERPL-MCNC: 344 UG/DL (ref 250–400)
WBC # BLD AUTO: 9 K/UL (ref 4.8–10.8)

## 2023-09-25 ENCOUNTER — TELEPHONE (OUTPATIENT)
Dept: HEMATOLOGY | Age: 68
End: 2023-09-25

## 2023-09-25 NOTE — TELEPHONE ENCOUNTER
Called pt to remind off appt. Pt wanted to reschedule.  Pt was transferred to  to make new appt      Electronically signed by Max Mancini MA on 9/25/2023 at 2:57 PM

## 2023-10-24 LAB
ANISOCYTOSIS BLD QL SMEAR: ABNORMAL
BASOPHILS # BLD: 0.1 K/UL (ref 0–0.2)
BASOPHILS NFR BLD: 0.7 % (ref 0–1)
EOSINOPHIL # BLD: 0.1 K/UL (ref 0–0.6)
EOSINOPHIL NFR BLD: 0.8 % (ref 0–5)
ERYTHROCYTE [DISTWIDTH] IN BLOOD BY AUTOMATED COUNT: 22.5 % (ref 11.5–14.5)
FERRITIN SERPL-MCNC: 39.2 NG/ML (ref 13–150)
HCT VFR BLD AUTO: 42.1 % (ref 37–47)
HGB BLD-MCNC: 13.3 G/DL (ref 12–16)
HYPOCHROMIA BLD QL SMEAR: ABNORMAL
IMM GRANULOCYTES # BLD: 0.1 K/UL
IRON SATN MFR SERPL: 10 % (ref 14–50)
IRON SERPL-MCNC: 34 UG/DL (ref 37–145)
LYMPHOCYTES # BLD: 3.2 K/UL (ref 1.1–4.5)
LYMPHOCYTES NFR BLD: 26.1 % (ref 20–40)
MCH RBC QN AUTO: 24.4 PG (ref 27–31)
MCHC RBC AUTO-ENTMCNC: 31.6 G/DL (ref 33–37)
MCV RBC AUTO: 77.4 FL (ref 81–99)
MICROCYTES BLD QL SMEAR: ABNORMAL
MONOCYTES # BLD: 0.7 K/UL (ref 0–0.9)
MONOCYTES NFR BLD: 5.6 % (ref 0–10)
NEUTROPHILS # BLD: 8.1 K/UL (ref 1.5–7.5)
NEUTS SEG NFR BLD: 66.3 % (ref 50–65)
OVALOCYTES BLD QL SMEAR: ABNORMAL
PLATELET # BLD AUTO: 368 K/UL (ref 130–400)
PLATELET SLIDE REVIEW: ABNORMAL
PMV BLD AUTO: 10.3 FL (ref 9.4–12.3)
RBC # BLD AUTO: 5.44 M/UL (ref 4.2–5.4)
RETICS # AUTO: 0.04 M/UL (ref 0.03–0.12)
RETICS/RBC NFR: 0.77 % (ref 0.5–1.5)
T4 FREE SERPL-MCNC: 1.38 NG/DL (ref 0.93–1.7)
TIBC SERPL-MCNC: 340 UG/DL (ref 250–400)
TSH SERPL DL<=0.005 MIU/L-ACNC: 2.29 UIU/ML (ref 0.27–4.2)
VIT B12 SERPL-MCNC: 490 PG/ML (ref 211–946)
WBC # BLD AUTO: 12.2 K/UL (ref 4.8–10.8)

## 2023-10-27 LAB — TTG IGA SER IA-ACNC: <2 U/ML (ref 0–3)

## 2023-11-08 LAB
BILIRUB UR QL STRIP: NEGATIVE
CLARITY UR: CLEAR
COLOR UR: YELLOW
GLUCOSE UR STRIP.AUTO-MCNC: =>1000 MG/DL
HGB UR STRIP.AUTO-MCNC: NEGATIVE MG/L
KETONES UR STRIP.AUTO-MCNC: NEGATIVE MG/DL
LEUKOCYTE ESTERASE UR QL STRIP.AUTO: NEGATIVE
NITRITE UR QL STRIP.AUTO: NEGATIVE
PH UR STRIP.AUTO: 5.5 [PH] (ref 5–8)
PROT UR STRIP.AUTO-MCNC: NEGATIVE MG/DL
SP GR UR STRIP.AUTO: 1.03 (ref 1–1.03)
UROBILINOGEN UR STRIP.AUTO-MCNC: 0.2 E.U./DL

## 2023-11-10 LAB — BACTERIA UR CULT: NORMAL

## 2023-11-21 DIAGNOSIS — I50.22 CHRONIC SYSTOLIC HEART FAILURE: ICD-10-CM

## 2023-11-21 RX ORDER — FUROSEMIDE 20 MG/1
20 TABLET ORAL DAILY PRN
Qty: 30 TABLET | Refills: 2 | OUTPATIENT
Start: 2023-11-21 | End: 2024-11-20

## 2023-11-21 RX ORDER — FUROSEMIDE 20 MG/1
20 TABLET ORAL DAILY PRN
Qty: 30 TABLET | Refills: 2 | Status: SHIPPED | OUTPATIENT
Start: 2023-11-21 | End: 2024-11-20

## 2023-11-21 NOTE — TELEPHONE ENCOUNTER
She will need to get further refills from PCP since we have been unable to reach her to reschedule f/u. Send her a letter if we haven't done so. TX!

## 2023-12-05 ENCOUNTER — TELEPHONE (OUTPATIENT)
Dept: HEMATOLOGY | Age: 68
End: 2023-12-05

## 2023-12-05 NOTE — TELEPHONE ENCOUNTER
I called patient to remind them of their appointment on 12/6/2023. Detailed voicemail was left with appointment date and time.

## 2023-12-08 ENCOUNTER — OFFICE VISIT (OUTPATIENT)
Dept: CARDIOLOGY | Facility: CLINIC | Age: 68
End: 2023-12-08
Payer: MEDICARE

## 2023-12-08 VITALS
DIASTOLIC BLOOD PRESSURE: 82 MMHG | SYSTOLIC BLOOD PRESSURE: 142 MMHG | OXYGEN SATURATION: 100 % | BODY MASS INDEX: 35.83 KG/M2 | WEIGHT: 202.2 LBS | HEIGHT: 63 IN | HEART RATE: 70 BPM

## 2023-12-08 DIAGNOSIS — Z86.711 HISTORY OF PULMONARY EMBOLUS (PE): ICD-10-CM

## 2023-12-08 DIAGNOSIS — D63.1 ANEMIA DUE TO STAGE 3A CHRONIC KIDNEY DISEASE: ICD-10-CM

## 2023-12-08 DIAGNOSIS — I48.0 PAROXYSMAL ATRIAL FIBRILLATION: ICD-10-CM

## 2023-12-08 DIAGNOSIS — I50.22 CHRONIC SYSTOLIC HEART FAILURE: Chronic | ICD-10-CM

## 2023-12-08 DIAGNOSIS — I10 ESSENTIAL HYPERTENSION: Primary | Chronic | ICD-10-CM

## 2023-12-08 DIAGNOSIS — I44.7 LBBB (LEFT BUNDLE BRANCH BLOCK): ICD-10-CM

## 2023-12-08 DIAGNOSIS — E11.65 TYPE 2 DIABETES MELLITUS WITH HYPERGLYCEMIA, WITHOUT LONG-TERM CURRENT USE OF INSULIN: Chronic | ICD-10-CM

## 2023-12-08 DIAGNOSIS — N18.31 ANEMIA DUE TO STAGE 3A CHRONIC KIDNEY DISEASE: ICD-10-CM

## 2023-12-08 DIAGNOSIS — Z79.01 CHRONIC ANTICOAGULATION: ICD-10-CM

## 2023-12-08 RX ORDER — MIDODRINE HYDROCHLORIDE 5 MG/1
5 TABLET ORAL
COMMUNITY
Start: 2023-10-22 | End: 2023-12-08

## 2023-12-08 RX ORDER — SACUBITRIL AND VALSARTAN 24; 26 MG/1; MG/1
1 TABLET, FILM COATED ORAL 2 TIMES DAILY
COMMUNITY
Start: 2023-09-13

## 2023-12-08 RX ORDER — INSULIN LISPRO 100 [IU]/ML
10 INJECTION, SOLUTION INTRAVENOUS; SUBCUTANEOUS 3 TIMES DAILY PRN
COMMUNITY

## 2023-12-08 RX ORDER — SUCRALFATE 1 G/1
1 TABLET ORAL
COMMUNITY
Start: 2023-10-22

## 2023-12-08 RX ORDER — METOPROLOL SUCCINATE 25 MG/1
25 TABLET, EXTENDED RELEASE ORAL 2 TIMES DAILY
COMMUNITY
Start: 2023-12-08

## 2023-12-08 RX ORDER — INSULIN GLARGINE 100 [IU]/ML
10 INJECTION, SOLUTION SUBCUTANEOUS NIGHTLY
COMMUNITY

## 2023-12-08 RX ORDER — PANTOPRAZOLE SODIUM 40 MG/1
40 TABLET, DELAYED RELEASE ORAL DAILY
COMMUNITY

## 2023-12-08 RX ORDER — ACETAMINOPHEN 500 MG
1000 TABLET ORAL EVERY 6 HOURS PRN
COMMUNITY

## 2023-12-08 RX ORDER — CLONIDINE HYDROCHLORIDE 0.1 MG/1
0.2 TABLET ORAL 3 TIMES DAILY
Start: 2023-12-08

## 2023-12-08 RX ORDER — SPIRONOLACTONE 25 MG/1
25 TABLET ORAL
COMMUNITY
Start: 2023-09-13

## 2023-12-08 RX ORDER — PROMETHAZINE HYDROCHLORIDE 25 MG/1
25 TABLET ORAL EVERY 6 HOURS PRN
COMMUNITY

## 2023-12-08 NOTE — Clinical Note
Check with her on Monday to see if agreeable to 14 day Zio - we discussed but she never really seemed to agree to the monitor... TX!

## 2023-12-08 NOTE — ASSESSMENT & PLAN NOTE
Followed by PCP, complicates heart failure. Consider increasing Farxiga to 10 mg (HF dose) to decrease CV risks and help control glucose. Dose may have been reduced during 10/23 hospitalization at [facility] due to volume depletion.

## 2023-12-08 NOTE — ASSESSMENT & PLAN NOTE
Patient's (Body mass index is 35.83 kg/m².) indicates that they are morbidly/severely obese (BMI > 40 or > 35 with obesity - related health condition) with health conditions that include hypertension, diabetes mellitus, and osteoarthritis . Weight is improving with treatment. BMI  is above average; BMI management plan is completed. Continue Trulicity. Goal BMI less than 30.

## 2023-12-08 NOTE — ASSESSMENT & PLAN NOTE
Initially uncontrolled at 176/98 today. Improved to 142/82 on recheck but still not quite to goal per today's or home readings. She declines any recommended BP medication changes today. Agree with staying off midodrine that was started during last hospitalization.

## 2023-12-08 NOTE — ASSESSMENT & PLAN NOTE
She reports last HF hospitalization was 9/2023 [facility], and Dr. Jasso was going to implant CardioMems but not done because he was let go. She is interested in pursuing CardioMems. She is reluctant to increase Entresto or make any changes to clonidine. Tried to explain benefits of increasing Entresto and trying to wean off clonidine to PRN only but she declines to make any changes today. Will assist with scheduling f/u with Dr. Sawyer as per her request and ask him to consider CardioMems implantation.

## 2023-12-08 NOTE — PROGRESS NOTES
Encounter Date:12/08/2023  Chief Complaint:   Subjective    Damaris Garcias is a 68 y.o. female who presents to [facility] today for overdue 1 month cardiac follow-up. She was last seen in the office for consultation 5/2023. She has also requested to see Dr. Sawyer in our [place] office but hasn't gotten an appointment - she was told she would have to see me first. She is accompanied by her significant other. She was previously followed by Dr. Azevedo and then Dr. Jasso in [place] and has seen them since in [place] [facility] and office since last seeing me in May. She has also been hospitalized at [facility] since her last visit.      Congestive Heart Failure    Hypertension    Atrial Fibrillation  Past medical history includes atrial fibrillation and CHF.      HPI       Slow Heart Rate     Additional comments: Dropped into 50s from 200s while in hospital. Lowest she has noticed is 60s since coming home. Checks 3 times per day.             Congestive Heart Failure     Additional comments: Chronic systolic. EF 27% 2017. 40% 2021. EF 46-52% with grade I diastolic dysfunction 10/2023 echo ([facility] Jardiance changed to Farxiga and dose decreased to 5 mg by Dr. Azevedo while in hospital in October.              Hypertension     Additional comments: Mild LVH per 10/2023 echo. Checks 3 times per day. Has been running up to 160s/90s. Was 117/67 the day before. Had multiple medication changes during hospitalization. Reportedly had hypotension on Entresto in the past and hasn't been able to decrease or stop clonidine due to hypertension.             Follow-up     Additional comments: 6 mo fu/ seen 5-/ was seen at [facility] 6/2023             Atrial Fibrillation     Additional comments: Per patient reportedly dx 10/2023 while at [facility] with dehydration and pneumonia. She states HR and BP kept going up and down. All blood thinners stopped - had bleeding in August from unknown source - hospitalized at  [facility] 8/2023. Some records in chart. Sometimes has some palpitations for an hour or so almost every day. Hasn't worn a heart monitor since discharge. She was on Xarelto from 2021 until 8/2023 for pulmonary emboli 2021.           Last edited by Nancy Sethi APRN on 12/8/2023  3:34 PM.        History: Past medical, surgical, family, and social history reviewed.    Outpatient Medications Marked as Taking for the 12/8/23 encounter (Office Visit) with Nancy Sethi APRN   Medication Sig Dispense Refill    acetaminophen (TYLENOL) 500 MG tablet Take 2 tablets by mouth Every 6 (Six) Hours As Needed for Mild Pain. Takes about twice a week      atorvastatin (LIPITOR) 20 MG tablet Take 1 tablet by mouth Daily.      cloNIDine (CATAPRES) 0.1 MG tablet Take 2 tablets by mouth 3 times a day.      dapagliflozin (FARXIGA) 5 MG tablet tablet Take 1 tablet by mouth Daily.      diclofenac (VOLTAREN) 75 MG EC tablet Take 1 tablet by mouth Daily As Needed (joint pain). Has been taking at least once, usually twice a week      Dulaglutide (Trulicity) 0.75 MG/0.5ML solution pen-injector Inject 1.5 mg under the skin into the appropriate area as directed 1 (One) Time Per Week. on Thurs      Entresto 24-26 MG tablet Take 1 tablet by mouth 2 (Two) Times a Day.      furosemide (LASIX) 20 MG tablet Take 1 tablet by mouth Daily As Needed (swelling). (Patient taking differently: Take 1 tablet by mouth Daily As Needed (swelling). Hasn't needed since 10/2023) 30 tablet 2    glipizide (GLUCOTROL) 10 MG tablet Take 1 tablet by mouth 2 (Two) Times a Day Before Meals.      insulin glargine (LANTUS, SEMGLEE) 100 UNIT/ML injection Inject 10 Units under the skin into the appropriate area as directed Every Night.      Insulin Lispro (humaLOG) 100 UNIT/ML injection Inject 10 Units under the skin into the appropriate area as directed 3 (Three) Times a Day As Needed for High Blood Sugar. *was given after hospital discharge in 10/2023- usually  "doesn't take-only takes if BG over 250      metoprolol succinate XL (TOPROL-XL) 25 MG 24 hr tablet Take 1 tablet by mouth 2 (Two) Times a Day. Replaces metoprolol tartrate      montelukast (SINGULAIR) 10 MG tablet Take 1 tablet by mouth Every Night.      nystatin (MYCOSTATIN) 754670 UNIT/GM powder Apply 1 application  topically to the appropriate area as directed 4 (Four) Times a Day As Needed.      pantoprazole (PROTONIX) 40 MG EC tablet Take 1 tablet by mouth Daily.      promethazine (PHENERGAN) 25 MG tablet Take 1 tablet by mouth Every 6 (Six) Hours As Needed for Nausea or Vomiting.      spironolactone (ALDACTONE) 25 MG tablet Take 1 tablet by mouth.      sucralfate (CARAFATE) 1 g tablet Take 1 tablet by mouth 3 (Three) Times a Day Before Meals.      traMADol (ULTRAM) 50 MG tablet Take 1 tablet by mouth 2 (Two) Times a Day As Needed for Moderate Pain.      vitamin D (ERGOCALCIFEROL) 1.25 MG (02191 UT) capsule capsule Take 1 capsule by mouth 2 (Two) Times a Week.      zolpidem (AMBIEN) 10 MG tablet Take 1 tablet by mouth every night at bedtime. EVERY NIGHT  1    [DISCONTINUED] cloNIDine (CATAPRES) 0.1 MG tablet Take 1 tablet by mouth 3 (Three) Times a Day As Needed for High Blood Pressure (SBP >160). (Patient taking differently: Take 2 tablets by mouth 3 times a day.)      [DISCONTINUED] metoprolol succinate XL (TOPROL-XL) 50 MG 24 hr tablet Take 1 tablet by mouth Daily. Replaces metoprolol tartrate (Patient taking differently: Take 0.5 tablets by mouth 2 (Two) Times a Day. Replaces metoprolol tartrate) 30 tablet 11    [DISCONTINUED] midodrine (PROAMATINE) 5 MG tablet Take 1 tablet by mouth.          Objective     Vital Signs:   /82 (BP Location: Left arm, Patient Position: Sitting, Cuff Size: Adult)   Pulse 70   Ht 160 cm (62.99\")   Wt 91.7 kg (202 lb 3.2 oz)   SpO2 100%   BMI 35.83 kg/m²   Wt Readings from Last 3 Encounters:   12/08/23 91.7 kg (202 lb 3.2 oz)   06/07/23 98.2 kg (216 lb 6.4 oz) "   06/02/23 94.3 kg (208 lb)         Vitals reviewed.   Constitutional:       Appearance: Well-developed and not in distress. Morbidly obese.   Eyes:      General: No scleral icterus.  HENT:      Head: Normocephalic and atraumatic.   Neck:      Vascular: No JVD.      Comments: thick  Pulmonary:      Effort: Pulmonary effort is normal.      Breath sounds: Normal breath sounds.   Chest:      Comments: Increased AP diameter, kyphosis noted  Cardiovascular:      Normal rate. Regular rhythm.      No gallop.    Pulses:     Intact distal pulses.   Edema:     Peripheral edema present.     Pretibial: bilateral 1+ pitting edema of the pretibial area.     Ankle: bilateral trace pitting edema of the ankle.  Skin:     General: Skin is warm and dry.   Neurological:      Mental Status: Alert and oriented to person, place, and time.      Gait: Gait abnormal (walks with rollator).   Psychiatric:         Behavior: Behavior is cooperative.         Result Review  Data Reviewed:  The following data was reviewed by: GUILHERME Boston on 12/08/2023  Scan on 10/16/2023 by [facility]: CT CHEST PE W- [facility]-10/16/23  Scan on 10/20/2023 by [facility]: ECHO-[facility]-10/20/23         Adult Transesophageal Echo (EVAN) W/ Cont if Necessary Per Protocol (06/05/2023 16:11)     Lab Results - Last 18 Months   Lab Units 10/24/23  1540 09/15/23  1220 08/23/23  0352 08/20/23  0432 08/19/23  0812 08/19/23  0020 08/18/23  1735 08/17/23  2300 08/17/23  0730 08/16/23  1050 08/16/23  0317 06/07/23  0802 06/07/23  0519 06/06/23  1206 06/06/23  0620 06/05/23  1707   BUN mg/dL  --   --   --   --   --   --   --   --   --   --   --   --  16  --  9 9   CREATININE mg/dL  --   --   --   --   --   --   --   --   --   --   --   --  0.68  --  0.56* 0.53*   SODIUM mmol/L  --   --   --   --   --   --   --   --   --   --   --   --  138  --  137 137   POTASSIUM mmol/L  --   --   --   --   --   --   --   --   --   --   --   --  3.5  --  3.5 3.8    CHLORIDE mmol/L  --   --   --   --   --   --   --   --   --   --   --   --  99  --  100 100   CALCIUM mg/dL  --   --   --   --   --   --   --   --   --   --   --   --  9.5  --  9.0 8.7   HEMOGLOBIN A1C %  --   --   --   --  7.5*  --   --   --   --   --   --   --   --   --   --   --    WBC K/uL 12.2* 9.0 9.4   < >  --   --  23.5*  --  17.8*  --  12.8*   < >  --    < >  --  13.93*   RBC M/uL 5.44* 5.00 3.61*   < >  --   --  3.40*  --  3.59*  --  2.37*   < >  --    < >  --  4.18   HEMATOCRIT % 42.1 40.7 31.6*   < >  --    < > 24.9*   < > 26.3*   < > 15.9*   < >  --    < >  --  31.1*   MCV fL 77.4* 81.4 87.5   < >  --   --  73.2*  --  73.3*  --  67.1*   < >  --    < >  --  74.4*   MCH pg 24.4* 23.2* 22.7*   < >  --   --  21.2*  --  20.3*  --  16.9*   < >  --    < >  --  19.6*   TSH uIU/mL 2.290  --   --   --   --   --   --   --   --   --   --   --   --   --   --   --    FREE T4 ng/dL 1.38  --   --   --   --   --   --   --   --   --   --   --   --   --   --   --    INR   --   --   --   --   --   --  2.07*  --  1.75*  --  5.17*  --   --   --   --   --    VIT D 25 HYDROXY ng/mL  --  38.0  --   --   --   --   --   --   --   --   --   --   --   --   --   --     < > = values in this interval not displayed.            ECG 12 Lead    Date/Time: 12/8/2023 1:56 PM  Performed by: Nancy Sethi APRN    Authorized by: Nancy Sethi APRN  Comparison: compared with previous ECG from 6/2/2023  Comparison to previous ECG: PVCs no longer present  Rhythm: sinus rhythm  Rate: normal  BPM: 67  Conduction: left bundle branch block    Clinical impression: abnormal EKG             Assessment and Plan   Problem List Items Addressed This Visit          Cardiac and Vasculature    Chronic systolic heart failure (Chronic)    Overview     EF 27% 2017 nuclear stress  EF abnormal stress 2021, reportedly normal cath  EF 40%, severe SHANON, moderate LAE, moderate to severe RVE, mild MR, severe TR (RVSP 45 mm Hg), mild PI, physiologic  pericardial effusion 3/2021 echo, [place]. Jasso, [facility]  EF 60-65% 6/2023 echo, Dr. Hernandez, [facility]  EF 46-52% 10/2023 echo, Dr. Azevedo, [facility]             Current Assessment & Plan     She reports last HF hospitalization was [place]20[place] [facility], and Dr. Jasso was going to implant CardioMems but not done because he was let go. She is interested in pursuing CardioMems. She is reluctant to increase Entresto or make any changes to clonidine. Tried to explain benefits of increasing Entresto and trying to wean off clonidine to PRN only but she declines to make any changes today. Will assist with scheduling f/u with Dr. Sawyer as per her request and ask him to consider CardioMems implantation.           Relevant Medications    Entresto 24-26 MG tablet    metoprolol succinate XL (TOPROL-XL) 25 MG 24 hr tablet    Essential hypertension - Primary (Chronic)    Current Assessment & Plan     Initially uncontrolled at 176/98 today. Improved to 142/82 on recheck but still not quite to goal per today's or home readings. She declines any recommended BP medication changes today. Agree with staying off midodrine that was started during last hospitalization.         Relevant Medications    spironolactone (ALDACTONE) 25 MG tablet    cloNIDine (CATAPRES) 0.1 MG tablet    metoprolol succinate XL (TOPROL-XL) 25 MG 24 hr tablet    Other Relevant Orders    ECG 12 Lead    LBBB (left bundle branch block)    Relevant Medications    apixaban (Eliquis) 5 MG tablet tablet    metoprolol succinate XL (TOPROL-XL) 25 MG 24 hr tablet    Paroxysmal atrial fibrillation    Current Assessment & Plan     Reported by patient but 10/22/23 discharge summary makes no mention of PAF just sinus tachycardia with PACs. Given history of unprovoked DVT reasonable to resume Eliquis 5 mg BID with close monitoring for GI blood loss anemia. Check extended 14 day Holter. Discussed possible loop recorder and Watchman if indicated given previous anemia - presumed GI blood loss from  undetermined source. She is aware of risks of anticoagulation vs risks of no anticoagulation. Check Hgb in 1 month.          Relevant Medications    Entresto 24-26 MG tablet    apixaban (Eliquis) 5 MG tablet tablet    metoprolol succinate XL (TOPROL-XL) 25 MG 24 hr tablet    Other Relevant Orders    ECG 12 Lead    CBC Auto Differential    Holter Monitor - 72 Hour Up To 15 Days       Coag and Thromboembolic    History of pulmonary embolus (PE)    Current Assessment & Plan     Start Eliquis (Xarelto discontinued 6/2023 due to anemia which has resolved). Will consider decreasing Eliquis to 2.5 mg BID for DVT/PE prophylaxis if no evidence of PAF.            Endocrine and Metabolic    Type 2 diabetes mellitus with hyperglycemia, without long-term current use of insulin (Chronic)    Overview     Hgb A1c 13% 1/2023  Hgb A1c 7.5% 8/2023         Current Assessment & Plan     Followed by PCP, complicates heart failure. Consider increasing Farxiga to 10 mg (HF dose) to decrease CV risks and help control glucose. Dose may have been reduced during 10/23 hospitalization at [facility] due to volume depletion.          Relevant Medications    insulin glargine (LANTUS, SEMGLEE) 100 UNIT/ML injection    Insulin Lispro (humaLOG) 100 UNIT/ML injection    dapagliflozin (FARXIGA) 5 MG tablet tablet       Hematology and Neoplasia    Anemia due to stage 3 chronic kidney disease    Overview     DIAGNOSTIC ABNORMALITIES:   CBC 07/24/2018 revealed a WBC of 10.8, hemoglobin 14.3, hematocrit 43.7, MCV 83.4, platelet count 321,000 with normal differential.  CBC 11/05/2018 revealed a WBC of 14.3, hemoglobin 12.19, hematocrit 39.7, MCV 85, platelet count 364,000 with ANC of 8.4, and monocytosis of 0.99.  CBC 11/20/2018 revealed a WBC of 11.3, hemoglobin 11.7, hematocrit 35.6, MCV 85.6, platelet count 313,000 with normal differential.  CBC 11/28/2018 revealed a WBC of 12.9, hemoglobin 11.9, hematocrit 36.5, MCV 84.9, platelet count 371,000 with ANC of  7.9.  CBC  12/10/2018 revealed a WBC of 11.1, hemoglobin 11.9, hematocrit 37.1, MCV 85.7, platelet count 365,000 with normal differential.      PREVIOUS INTERVENTIONS:  Ferrous sulfate 325 mg 02/12/2019 through 02/18/2019.  Stopped due to severe abdominal cramps.  Nulecit 125 mg 05/05/2019 and 03/12/2019 at [facility].   Third dose cancelled due to flu.         Relevant Medications    spironolactone (ALDACTONE) 25 MG tablet    Other Relevant Orders    Hemoglobin & Hematocrit, Blood     Other Visit Diagnoses       Chronic anticoagulation        Relevant Orders    Hemoglobin & Hematocrit, Blood          Patient was given instructions and counseling regarding her condition or for health maintenance advice. Please see specific information pulled into the AVS if appropriate.    Follow Up :   Return in about 3 months (around 3/8/2024) for Recheck.       Time Spent: I spent 58 minutes caring for Damaris on this date of service. This time includes time spent by me in the following activities: preparing for the visit, reviewing tests, performing a medically appropriate examination and/or evaluation, counseling and educating the patient/family/caregiver, ordering medications, tests, or procedures, documenting information in the medical record, and care coordination.     I spent 1 minute on the separately reported service of EKG. This time is not included in the time used to support the E/M service also reported today.       GUILHERME Wang, ACNP-BC, CHFN-BC

## 2023-12-08 NOTE — ASSESSMENT & PLAN NOTE
Start Eliquis (Xarelto discontinued 6/2023 due to anemia which has resolved). Will consider decreasing Eliquis to 2.5 mg BID for DVT/PE prophylaxis if no evidence of PAF. Check H&H in 1 month (unless rechecked by other providers). It appears she has been scheduled to see hematology as well.

## 2023-12-08 NOTE — ASSESSMENT & PLAN NOTE
Reported by patient but 10/22/23 discharge summary makes no mention of PAF just sinus tachycardia with PACs. Given history of unprovoked DVT reasonable to resume Eliquis 5 mg BID with close monitoring for GI blood loss anemia. Check extended 14 day Holter. Discussed possible loop recorder and Watchman if indicated given previous anemia - presumed GI blood loss from undetermined source. She is aware of risks of anticoagulation vs risks of no anticoagulation. Check Hgb in 1 month.

## 2023-12-11 ENCOUNTER — TELEPHONE (OUTPATIENT)
Dept: CARDIOLOGY | Facility: CLINIC | Age: 68
End: 2023-12-11
Payer: MEDICARE

## 2023-12-11 NOTE — TELEPHONE ENCOUNTER
She has requested Dr. Sawyer to be her usual cardiologist and would like to see him in [place]. I think she wants to see me here in [place] too. If she has been assigned to a different cardiologist let Trinidad know she wants to change to Dr. Sawyer and contact [place] office to get an appt with Dr. Sawyer. If the appointment is around the same time as my f/u with her in March just move my f/u out 3 months from his. [place]!    Message from Nancy Sethi.  Can we get this patient scheduled with Dr. Sawyer for a 3 mo fu in [place]?

## 2023-12-12 ENCOUNTER — TELEPHONE (OUTPATIENT)
Dept: CARDIOLOGY | Facility: CLINIC | Age: 68
End: 2023-12-12

## 2023-12-12 NOTE — TELEPHONE ENCOUNTER
Caller: Damaris Garcias    Relationship: Self    Best call back number: [phone]    What is the best time to reach you: ANYTIME    Who are you requesting to speak with (clinical staff, provider,  specific staff member): SPECIFIC STAFF MEMBER     Do you know the name of the person who called: [place]    What was the call regarding: HOLTER MONITOR FOR THURSDAY    Is it okay if the provider responds through MyChart: PLEASE CALL PATIENT.

## 2023-12-13 ENCOUNTER — OFFICE VISIT (OUTPATIENT)
Dept: CARDIOLOGY | Facility: CLINIC | Age: 68
End: 2023-12-13

## 2023-12-13 VITALS
BODY MASS INDEX: 35.44 KG/M2 | OXYGEN SATURATION: 98 % | DIASTOLIC BLOOD PRESSURE: 86 MMHG | SYSTOLIC BLOOD PRESSURE: 143 MMHG | HEIGHT: 63 IN | HEART RATE: 71 BPM | WEIGHT: 200 LBS

## 2023-12-13 DIAGNOSIS — I50.22 CHRONIC SYSTOLIC HEART FAILURE: Primary | Chronic | ICD-10-CM

## 2023-12-13 DIAGNOSIS — I10 ESSENTIAL HYPERTENSION: Chronic | ICD-10-CM

## 2023-12-13 DIAGNOSIS — R00.2 PALPITATIONS: ICD-10-CM

## 2023-12-13 DIAGNOSIS — I48.0 PAROXYSMAL ATRIAL FIBRILLATION: ICD-10-CM

## 2023-12-13 RX ORDER — CLONIDINE HYDROCHLORIDE 0.1 MG/1
0.1 TABLET ORAL 3 TIMES DAILY
Qty: 90 TABLET | Refills: 3 | Status: SHIPPED | OUTPATIENT
Start: 2023-12-13

## 2023-12-13 NOTE — PROGRESS NOTES
[facility] - CARDIOLOGY  New Patient Initial Outpatient Evaluation    Primary Care Physician: Virginia Rey APRN    Subjective     Chief Complaint: Transitioning to new cardiology provider for congestive heart failure, paroxysmal atrial fibrillation, chronic kidney disease, and a history of pulmonary embolism.    History of Present Illness    By way of review, the patient follows in [place] with GUILHERME Wang, who just saw her 5 days ago. She first saw Nancy Sethi in 05/2023, as she saw a new provider then with records suggesting she had been following with Dr. Tal Jasso in [place] when he was practicing in [facility]. When he left the area, she then established care with Nancy in 05/2023, and requested ultimately to follow with me here in [place]. She asked his initial records reference hospitalization, the patient had a [facility] in 2021 for systolic heart failure, during which she was diuresed about 9 pounds. She also mentions the patient's ejection fraction being reported as low as 27% on a nuclear stress test in 2017, and with another abnormal stress test in 2021 leading to a heart catheterization that was reportedly normal. She also references an echocardiogram during the aforementioned hospital stay at [place] in 03/2021, in which the ejection fraction was reported at 40% with severe right, and moderate left atrial enlargement, moderate to severe right ventricular enlargement with severe tricuspid regurgitation. Apparently, Dr. Jasso had mentioned to the patient about needing a CardioMEMS device, so Nancy discussed this with her at her first visit, but also mentioned that need for intensified heart failure medication therapy, and to wean off clonidine, and nifedipine first. GUILHERME Wang, ordered a transthoracic echocardiogram that apparently was done at [facility], and according to a result noted in chart by Nancy, showed full recovery of EF  "up to 60 to 65%. She also mentions moderate asymmetric septal hypertrophy, but no LV outflow tract obstruction. This also mentions a moderate thickening of the left coronary cusp of the aortic valve \"with vegetation cannot be excluded\". Right ventricle is said to be normal in size, and function with mildly elevated pulmonary pressure, and that result note is also documented that the patient had significant drop in blood pressure (68/46 mmHg) after just taking a half dose of Entresto the night before. She also reportedly had a low-grade fever along with chills, and sweats because of these, and they mentioned the potential endocarditis on the valve. GUILHERME Wang, advised it that she seek treatment at the hospital here in [place] she did as advised, and ultimately was admitted to the cardiology service. During this stay, which lasted from 06/05/2023 until 06/08/2023, she underwent transesophageal echocardiogram, which noted no evidence of vegetation on the aortic valve, and no paravalvular abscess. Procalcitonin was said to be normal. The white blood cell count was minimally elevated, and remained so throughout the stay. Hospitalist consultation suggested leukocytosis was a stress response. She was started then on losartan, and Jardiance to optimize CHF medication, and was given IV Lasix x1. They do suggest that she was diuresed almost 13 L during that stay, but weight measurements did not correspond with this. Lastly, she followed back up then with Nancy Hoffman thereafter. As mentioned above on 12/08/2023, Nancy's note that also mentions the fact that the patient had been hospitalized at [facility], so I was able to look in Care Everywhere to find those records. It appears that she was admitted there from 08/16/2023 until 08/24/2023. The discharge summary suggests that she was admitted for severe iron deficiency anemia, and sepsis that was thought to be secondary to a left antecubital fossa site " infection. Hematology oncology saw the patient, and advised with holding of Xarelto, and gave Venofer. Nancy's note also mentions the patient was hospitalized at [place] in 10/2023. Nancy mentions that atrial fibrillation was not noted, and either the discharge summary from [facility] in 10/2023 nor the one from [facility] in 08/2023. Given the reported concern regarding atrial fibrillation, and the fact the patient had previously been anticoagulated since an unprovoked DVT/PE in 2021 with Eliquis, Nancy advised then to monitor closely for GI blood loss, but to resume Eliquis. She plans to check hemoglobin in 1 month. She mentioned the possibility of watching with the patient should bleeding/anemia recur, though acknowledged that there have not been any formal diagnosis of atrial fibrillation made.    The patient states that she is doing well. She is accompanied by an adult male. She reports that when she went into the hospital in 10/2023, home health was coming to see her prior to her to being admitted. She states that she had a problem where her vision would go almost dark, and it would come back, and she would drop her phone, and she could not pick it up at first, and then her heart was beating really fast, and her blood pressure was fluctuating, up and down. She reports that all they did in 10/2023 was trying to bring her blood pressure up or bring it down. She states that they did nothing. She reports that Dr. Azevedo was going to do a nuclear stress test on her, but she had already had one done in 09/2023, so he could not do one in 10/2023. She states that she has never been given any discussion, education, or understanding and why they think all that stuff is swinging up and down with her blood pressure, and heart rate.    She reports that her heart will beat really fast, and it goes into atrial fibrillation. She states that it will be 196 BPM, and then it dropped down to 70 BPM. She reports  "that she can feel her heartbeat really hard, and \"it feels like it is going to be out of her chest\". She states that she feels heavy at that time, and this has been going on since she was in the hospital in 10/2023. She states that she is going to see GUILHERME Wang, tomorrow to have a heart monitor placed. She reports that those symptoms happen at least 2 to 3 times a week. She states that they can last 5 minutes, and sometimes they can last 30 minutes. She reports that she was told that she was in atrial fibrillation in 10/2023 while in the emergency room.    She reports that she was in the hospital for congestive heart failure in 2021, and she had problems with swelling, and she went back in the hospital in [place], and Dr. Jasso got 9 L of fluid off. She reports that she was in the hospital in 09/2023, when they got 4 L of fluid off her, but they sent her home from [place] with her feet and legs still swollen. She states that she could breathe better once the extra fluid was removed. She reports that she can tell when she is swelling bad, and she takes her fluid pills as needed because she gets short of breath easily, and she starts wheezing. She reports that she has felt okay from that standpoint in the last month. She reports that she does experience more shortness of breath when her blood pressure becomes elevated. She states that she weighs herself every day to monitor her fluid retention. She reports that she takes the Lasix 20 mg as needed, and if she notices that she is not urinating as much when she takes it, she will then take 40 mg. She states that she usually takes it later in the afternoon if she is not going to the bathroom as much as she needs. She reports that she was given 40 mg in the hospital, twice a day. She states that she usually waits 4 to 5 hours between doses.    She reports that she is doing well with her current regimen. She states that she is still having problems with her blood " "pressure swings, but they are \"not too bad\". She reports that occasionally in the morning, her blood pressure is elevated when she gets up. She states that she takes her blood pressure, and it is about 170/90 mmHg, and then she takes her medications, and then her blood pressure will come down. She reports that she takes her other clonidine at 3:00 PM, and then she takes it at bedtime about 10:30 PM or 11:00 PM. She states that Dr. Azevedo has tried to wean her off the clonidine, and every time she does, her blood pressure \"skyrockets\", and it stays up. She reports that she takes the clonidine 3 times a day, once in the morning, 3:00 p.m., and then she takes it at bedtime about 10:30 p.m. or 11:00 p.m. She reports that she was put on Jardiance, and then Dr. Azevedo took her off it, and he put her on Farxiga, and she was on 10 mg, and then he dropped it down to 5 mg.     She reports that she is not having a lot of trouble retaining fluid. She states that she has chest pain occasionally. She reports that one night she had it really bad under her shoulder blade, and it scared her. She states that she was having palpitations at the same time as the racing heartbeat. She reports that she has never had chest discomfort when she is walking, but now if she experiences it, it is usually when she is sitting or doing something.    She reports that she was on losartan 5 mg once a day when she went to the hospital in 06/2023, and then Dr. Azevedo took her off the losartan to be put on Entresto. She reports that she was on 100 mg of metoprolol, but Dr. Azevedo cut it back.    She reports that she was checked for sleep apnea, and he did put an oxygen thing on her, and she wore it all night, and he told her that she was okay with that. She reports that she has not seen Dr. Mensah since 2017. The accompanying adult male states that he has noticed her snoring has become worse within the last 3 to 4 months.     She confirms that " GUILHERME Wang, has put her back on Eliquis last week, she has not noticed any bleeding issues thus far.      Review of Systems   Constitutional: Negative for malaise/fatigue.   Cardiovascular:  Positive for chest pain and palpitations. Negative for claudication, dyspnea on exertion, leg swelling, near-syncope, orthopnea, paroxysmal nocturnal dyspnea and syncope.   Respiratory:  Positive for snoring. Negative for shortness of breath.    Hematologic/Lymphatic: Does not bruise/bleed easily.      Otherwise complete ROS reviewed and negative except as mentioned in the HPI.      Past Medical History:   Past Medical History:   Diagnosis Date    Afib 10/2023    Bradycardia     Cardiomyopathy     CHF (congestive heart failure)     Chronic kidney disease     Coronary arteriosclerosis     Diabetes mellitus     Diverticulitis     GERD (gastroesophageal reflux disease)     HLD (hyperlipidemia)     HTN (hypertension)     Hx of pulmonary embolus     Hypercholesterolemia     LBBB (left bundle branch block)     Obesity     Pulmonary embolism     Rectal bleeding     RLS (restless legs syndrome)     Vitamin D deficiency        Past Surgical History:  Past Surgical History:   Procedure Laterality Date    APPENDECTOMY  1970    CARDIAC CATHETERIZATION  2021    AT [facility]- Dr. Jasso - mild, diffuse, non-obstructive CAD    CHOLECYSTECTOMY  1991    HYSTERECTOMY  1989    LEG SURGERY Left 2015    CALCIFIED TUMOR    TUBAL ABDOMINAL LIGATION  1982    TUMOR REMOVAL         Family History: family history includes Arrhythmia in her maternal aunt; Asthma in her father; Diabetes type II in her brother, father, mother, and paternal grandmother; Heart attack in her brother and paternal grandfather; Heart disease in her brother, father, maternal grandfather, maternal grandmother, mother, paternal grandfather, and paternal grandmother; Heart failure in her brother, father, maternal grandmother, and mother; Hyperlipidemia in her father; Hypertension  in her brother, father, mother, and sister; Stroke in her paternal grandmother and sister; Ulcerative colitis in her sister.    Social History:  reports that she quit smoking about 27 years ago. Her smoking use included cigarettes. She started smoking about 48 years ago. She has a 5.00 pack-year smoking history. She has never used smokeless tobacco. She reports that she does not currently use alcohol. She reports that she does not use drugs.    Medications:  Prior to Admission medications    Medication Sig Start Date End Date Taking? Authorizing Provider   acetaminophen (TYLENOL) 500 MG tablet Take 2 tablets by mouth Every 6 (Six) Hours As Needed for Mild Pain. Takes about twice a week   Yes Heidi Garcia MD   apixaban (Eliquis) 5 MG tablet tablet Take 1 tablet by mouth 2 (Two) Times a Day. 12/8/23 12/7/24 Yes Nancy Sethi APRN   atorvastatin (LIPITOR) 20 MG tablet Take 1 tablet by mouth Daily.   Yes Heidi Garcia MD   cloNIDine (CATAPRES) 0.1 MG tablet Take 1 tablet by mouth 3 times a day. 12/13/23  Yes Joseph Sawyer MD   dapagliflozin (FARXIGA) 5 MG tablet tablet Take 1 tablet by mouth Daily.   Yes Heidi Garcia MD   diclofenac (VOLTAREN) 75 MG EC tablet Take 1 tablet by mouth Daily As Needed (joint pain). Has been taking at least once, usually twice a week   Yes Heidi Garcia MD   Dulaglutide (Trulicity) 0.75 MG/0.5ML solution pen-injector Inject 1.5 mg under the skin into the appropriate area as directed 1 (One) Time Per Week. on Thurs   Yes Heidi Garcia MD   Entresto 24-26 MG tablet Take 1 tablet by mouth 2 (Two) Times a Day. 9/13/23  Yes Heidi Garcia MD   furosemide (LASIX) 20 MG tablet Take 1 tablet by mouth Daily As Needed (swelling).  Patient taking differently: Take 1 tablet by mouth Daily As Needed (swelling). Hasn't needed since 10/2023 11/21/23 11/20/24 Yes Nancy Sethi APRN   glipizide (GLUCOTROL) 10 MG tablet Take 1 tablet by mouth  2 (Two) Times a Day Before Meals.   Yes Heidi Garcia MD   insulin glargine (LANTUS, SEMGLEE) 100 UNIT/ML injection Inject 10 Units under the skin into the appropriate area as directed Every Night.   Yes Heidi Garcia MD   Insulin Lispro (humaLOG) 100 UNIT/ML injection Inject 10 Units under the skin into the appropriate area as directed 3 (Three) Times a Day As Needed for High Blood Sugar. *was given after hospital discharge in 10/2023- usually doesn't take-only takes if BG over 250   Yes Heidi Garcia MD   metoprolol succinate XL (TOPROL-XL) 25 MG 24 hr tablet Take 1 tablet by mouth 2 (Two) Times a Day. Replaces metoprolol tartrate 12/8/23  Yes Nancy Sethi APRN   montelukast (SINGULAIR) 10 MG tablet Take 1 tablet by mouth Every Night. 10/1/19  Yes Heidi Garcia MD   nystatin (MYCOSTATIN) 046994 UNIT/GM powder Apply 1 application  topically to the appropriate area as directed 4 (Four) Times a Day As Needed.   Yes Heidi Garcia MD   pantoprazole (PROTONIX) 40 MG EC tablet Take 1 tablet by mouth Daily.   Yes Heidi Garcia MD   promethazine (PHENERGAN) 25 MG tablet Take 1 tablet by mouth Every 6 (Six) Hours As Needed for Nausea or Vomiting.   Yes Heidi Garcia MD   spironolactone (ALDACTONE) 25 MG tablet Take 1 tablet by mouth. 9/13/23  Yes Heidi Garcia MD   sucralfate (CARAFATE) 1 g tablet Take 1 tablet by mouth 3 (Three) Times a Day Before Meals. 10/22/23  Yes Heidi Garcia MD   TiZANidine (ZANAFLEX) 4 MG capsule Take 1 capsule by mouth 3 (Three) Times a Day As Needed for Muscle Spasms. Usually takes twice a day   Yes Heidi Garcia MD   traMADol (ULTRAM) 50 MG tablet Take 1 tablet by mouth 2 (Two) Times a Day As Needed for Moderate Pain.   Yes Hedii Garcia MD   vitamin D (ERGOCALCIFEROL) 1.25 MG (14277 UT) capsule capsule Take 1 capsule by mouth 2 (Two) Times a Week.   Yes Heidi Garcia MD   zolpidem (AMBIEN)  "10 MG tablet Take 1 tablet by mouth every night at bedtime. EVERY NIGHT 9/5/19  Yes Provider, MD Heidi   cloNIDine (CATAPRES) 0.1 MG tablet Take 2 tablets by mouth 3 times a day. 12/8/23 12/13/23 Yes Stevejm NancyGUILHERME Lamb     Allergies:  Allergies   Allergen Reactions    Cephalexin Shortness Of Breath    Cheese Unknown - High Severity     BLUE CHEESE: tongue swells    Codeine Shortness Of Breath    Levaquin [Levofloxacin] Anaphylaxis    Morphine Shortness Of Breath    Naproxen Shortness Of Breath    Penicillins Shortness Of Breath    Aspirin Other (See Comments)     Stopped 325 mg due to hemoptysis    Cetirizine Dizziness    Ciprofloxacin Other (See Comments)     Was taking zanaflex at the time and there is a drug interaction    Gabapentin Swelling     BLEs - not face, lips, or tongues    Keppra [Levetiracetam] Other (See Comments)     VISION LOSS    Lisinopril Other (See Comments)     Dry cough    Meloxicam Nausea Only    Vancomycin Other (See Comments)     Vision loss    Sulfa Antibiotics Rash       Objective     Vital Signs: /86   Pulse 71   Ht 160 cm (62.99\")   Wt 90.7 kg (200 lb)   SpO2 98%   BMI 35.44 kg/m²     Vitals and nursing note reviewed.   Constitutional:       General: Not in acute distress.     Appearance: Not in distress.   Neck:      Vascular: No JVD or JVR. JVD normal.   Pulmonary:      Effort: Pulmonary effort is normal.      Breath sounds: Normal breath sounds.   Cardiovascular:      Normal rate. Regular rhythm.      Murmurs: There is no murmur.      No gallop.  No rub.   Pulses:     Intact distal pulses.   Edema:     Peripheral edema absent.   Skin:     General: Skin is warm and dry.   Neurological:      Mental Status: Alert, oriented to person, place, and time and oriented to person, place and time.     Results Reviewed:      ECG 12 Lead    Date/Time: 12/13/2023 12:50 PM  Performed by: Joseph Sawyer MD    Authorized by: Joseph Sawyer MD  Comparison: compared with " "previous ECG from 12/8/2023  Comparison to previous ECG: No significant change except for the axis has shifted because of likely limb lead reversal on the prior ECG.  Rhythm: sinus rhythm  BPM: 71  Conduction: left bundle branch block    Clinical impression: abnormal EKG  Comments: Left axis deviation.      External records review: I reviewed numerous records, most of which are summarized above in the HPI, trying to detail the patient's care in [facility], and here.   - Most recent echocardiogram was a transthoracic interpreted by Dr. Azevedo in [place] that was performed on 10/20/2023. He reports grossly normal LV size with mild LVH, and an EF \"in the range of 46-52%\". He reports normal size, and function of the right ventricle. Moderate dilation of the left atrium.    - Discharge summary from [facility] where she was admitted from 10/17/2023 to 10/22/2023 was reviewed: Of note, medications from this list include clonidine 0.2 mg 3 times, Entresto 24/26 twice a day, spironolactone 25 mg daily, midodrine 5 mg twice daily (change from 10), Toprol-Xl 25 mg twice daily (change from 25 mg daily). Admission diagnoses were listed as syncope, orthostasis, sinus tachycardia with PACs, chronic systolic, and diastolic heart failure. The hospital course states that she presented with multiple near-syncopal episodes, so she was given gentle IV fluid resuscitation, but also states she remained persistently orthostatic despite this. He does state midodrine, was added, and patient was encouraged to use compression stockings.      No results found for: \"CHOL\", \"TRIG\", \"HDL\", \"VLDL\", \"LDLHDL\"  Lab Results   Component Value Date    HGBA1C 7.5 (H) 08/19/2023       Results for orders placed during the hospital encounter of 06/05/23    Adult Transesophageal Echo (EVAN) W/ Cont if Necessary Per Protocol    Interpretation Summary    Left ventricular systolic function is normal. Left ventricular ejection fraction " appears to be 61 - 65%.    Left ventricular wall thickness is consistent with hypertrophy.    No evidence of a left atrial appendage thrombus was present.    Saline test results are negative for right to left atrial level shunt.    Here is moderate calcification of the aortic valve mainly affecting the left coronary cusp(s). Trace aortic valve regurgitation is present.    There is no evidence of an aortic valve mass is present. No evidence of an aortic valve abscess is present.    Mild mitral valve regurgitation is present with multiple jets noted.      Assessment / Plan        Problem List Items Addressed This Visit          Cardiac and Vasculature    Essential hypertension (Chronic)    Relevant Medications    cloNIDine (CATAPRES) 0.1 MG tablet    Chronic systolic heart failure - Primary (Chronic)    Overview     EF 27% 2017 nuclear stress  EF abnormal stress 2021, reportedly normal cath  EF 40%, severe SHANON, moderate LAE, moderate to severe RVE, mild MR, severe TR (RVSP 45 mm Hg), mild PI, physiologic pericardial effusion 3/2021 echo, Dr. Jasso, [facility]  [facility] 60-[facility]  EF 46-52% 10/2023 echo, Dr. Azevedo, [facility]             Relevant Orders    Adult Transthoracic Echo Complete w/ Color, Spectral and Contrast if necessary per protocol    Paroxysmal atrial fibrillation    Palpitations    Relevant Orders    Holter Monitor - 72 Hour Up To 15 Days       Recommendations/plans:    Ms. Damaris Garcias is an established patient with  the practice by virtue of satellite office with GUILHERME Wang, in [place]. Today was my first visit with her, and we spent almost an hour reviewing her medical history, recent hospitalizations, recent and current complaints, and where to go from there. At present, I would summarize her issues, and current status/plans as follows:    1. Palpitations, questionable atrial fibrillation: There has never been a formal documentation of atrial fibrillation, though Nancy  Navdeep' last note from a visit last week references the fact, and the patient confirms, that she was verbally told this when hospitalized in [place] in 10/2023.  I have extensively reviewed all records from that hospitalization, including the discharge summary, and there is no mention of that as a diagnosis or any other event having been documented during that hospital stay. Nancy Sethi had planned to place the patient on a ZIO patch to see if her current reports of palpitations corresponded to atrial fibrillation, and the patient is currently scheduled to return to Nancy's office in [place] tomorrow, 12/14/2023, have this placed. I offered, and she gladly accepted to have the same monitor placed here today in our office, so it is to save her a trip to [place] since she lives in [place], which is about retirement between [facility] and [place].  -I will also seek to obtain any ECGs and telemetry strips performed at [facility] during her admission there in October of this year, when reportedly she was told she had atrial fibrillation  - I do not disagree with resuming Eliquis empirically, at least for now. This was recently done by Nancy Sethi given the reports of atrial fibrillation relayed by the patient, but since the patient had previously had significant blood loss anemia on Eliquis (when taking it for DVT/PE in 2021), if her symptoms prove to not be from atrial fibrillation and we cannot otherwise find documented proof of this with records we'll try to obtain (mentioned above), then I would favor implanting a loop monitor for ongoing surveillance, and having her off Eliquis unless atrial fibrillation were indeed confirmed at some point in the future.    2. Chronic systolic heart failure (?HFrecEF), Stg C/NYHA I: Has had reportedly low ejection fraction in the past, but reportedly normal by most recent assessments. She currently has no signs or symptoms of congestive heart failure, but is on  appropriate guideline directed medical therapy with Entresto, Aldactone, Farxiga, and Toprol XL. However, she is struggling mildly with fluctuating blood pressures, and heart rates over the last recent months, which I suspect is more due to autonomic dysfunction, but may be able to combat several other issues by patience and gradual titration of heart failure medications moving forward.  - In the meantime, no change in heart failure medications recommended at present, but likely will make some response to other changes recommended below.    3. Autonomic dysfunction/essential hypertension: Though it sounds as though her issues with blood pressure swings have been slightly better of late, she is still on clonidine 0.2 mg 3 times daily. I have recommended that she wean this to 0.1 mg 3 times daily first, and if blood pressure starts to climb, we can certainly combat that with increasing some of her heart failure medications, which could include switching metoprolol to carvedilol, increasing the Aldactone dose, or increasing the Entresto dose (though I would hesitate to do that since she had previously been on a higher dose with significant drops in blood pressure at the time).    We plan on seeing the patient back in follow-up with a repeat echocardiogram for EF assessment in 3 months, but will hopefully have her successfully weaned off clonidine, and manage her blood pressure through phone calls between now, and then.    68 minutes spent on day of service.      Transcribed from ambient dictation for Joseph Sawyer MD by Sierra Mcmanus.  12/13/23   17:25 CST    Patient or patient representative verbalized consent to the visit recording.  I Joseph Sawyer MD have personally performed the services described in this document as scribed by the above individual, and it is both accurate and complete.   I have edited each component as needed.    Joseph Sawyer MD  12/13/2023  21:59 CST

## 2024-01-23 ENCOUNTER — TELEPHONE (OUTPATIENT)
Dept: CARDIOLOGY | Facility: CLINIC | Age: 69
End: 2024-01-23
Payer: MEDICARE

## 2024-01-23 DIAGNOSIS — I48.0 PAROXYSMAL ATRIAL FIBRILLATION: Primary | ICD-10-CM

## 2024-01-23 DIAGNOSIS — R00.2 PALPITATIONS: ICD-10-CM

## 2024-01-23 NOTE — TELEPHONE ENCOUNTER
Spoke to pt today about her labs that Dr Page reviewed in your absence. She wanted to make you aware that Dr Sawyer is putting in a loop recorder next week and that he took her off of eliquis- permanently. Wore holter and he stated she had no signs of afib and was taking her off of it for now, until she needed it in the future. She wanted to make you aware.    Thanks

## 2024-01-29 ENCOUNTER — TELEPHONE (OUTPATIENT)
Dept: HEMATOLOGY | Age: 69
End: 2024-01-29

## 2024-01-29 NOTE — TELEPHONE ENCOUNTER
Called patient and reminded patient of their appointment on 1/31/2024and patient confirmed they would be here.

## 2024-01-30 DIAGNOSIS — D50.8 OTHER IRON DEFICIENCY ANEMIA: Primary | ICD-10-CM

## 2024-01-31 ENCOUNTER — HOSPITAL ENCOUNTER (OUTPATIENT)
Dept: CARDIOLOGY | Facility: HOSPITAL | Age: 69
Discharge: HOME | End: 2024-01-31
Admitting: INTERNAL MEDICINE
Payer: MEDICARE

## 2024-01-31 VITALS
RESPIRATION RATE: 17 BRPM | HEIGHT: 63 IN | OXYGEN SATURATION: 99 % | HEART RATE: 64 BPM | WEIGHT: 200 LBS | BODY MASS INDEX: 35.44 KG/M2 | TEMPERATURE: 97.5 F | DIASTOLIC BLOOD PRESSURE: 108 MMHG | SYSTOLIC BLOOD PRESSURE: 150 MMHG

## 2024-01-31 DIAGNOSIS — I48.0 PAROXYSMAL ATRIAL FIBRILLATION: ICD-10-CM

## 2024-01-31 DIAGNOSIS — R00.2 PALPITATIONS: ICD-10-CM

## 2024-01-31 PROCEDURE — 33285 INSJ SUBQ CAR RHYTHM MNTR: CPT

## 2024-01-31 PROCEDURE — C1764 EVENT RECORDER, CARDIAC: HCPCS

## 2024-01-31 RX ORDER — LIDOCAINE HYDROCHLORIDE AND EPINEPHRINE BITARTRATE 20; .01 MG/ML; MG/ML
INJECTION, SOLUTION SUBCUTANEOUS
Status: DISPENSED
Start: 2024-01-31 | End: 2024-01-31

## 2024-01-31 RX ORDER — LIDOCAINE HCL/EPINEPHRINE/PF 2%-1:200K
VIAL (ML) INJECTION
Status: COMPLETED | OUTPATIENT
Start: 2024-01-31 | End: 2024-01-31

## 2024-01-31 RX ADMIN — LIDOCAINE HYDROCHLORIDE AND EPINEPHRINE 10 ML: 20; 5 INJECTION, SOLUTION EPIDURAL; INFILTRATION; INTRACAUDAL; PERINEURAL at 12:17

## 2024-03-01 ENCOUNTER — TELEPHONE (OUTPATIENT)
Dept: HEMATOLOGY | Age: 69
End: 2024-03-01

## 2024-03-04 ENCOUNTER — TELEPHONE (OUTPATIENT)
Dept: CARDIOLOGY | Facility: CLINIC | Age: 69
End: 2024-03-04
Payer: MEDICARE

## 2024-03-04 NOTE — TELEPHONE ENCOUNTER
For about a week the patient has noticed her blood pressure is elevated every evening around 7:30-8pm.    She takes her clonidine 0.1mg at 7am, 3pm, and 11pm. Her blood pressure in the morning has been 130s/60s, pulse . Around 7pm blood pressure has been as high as 220/138 with a normal pulse. She experiences weakness when her bp elevates like this, then gets a headache when it starts going back down. Please advise. Antonella James MA

## 2024-03-06 NOTE — TELEPHONE ENCOUNTER
Left message as is allowed per  verbal release. ISIDORO Knowles Martin G, MD  You46 minutes ago (11:47 AM)       Advise PCP f/u for BP issues - I laid out some potential strategies in my OV note they can use if they like

## 2024-03-08 DIAGNOSIS — I50.22 CHRONIC SYSTOLIC HEART FAILURE: ICD-10-CM

## 2024-03-11 RX ORDER — FUROSEMIDE 20 MG/1
20 TABLET ORAL DAILY PRN
Qty: 90 TABLET | Refills: 1 | Status: SHIPPED | OUTPATIENT
Start: 2024-03-11 | End: 2025-03-11

## 2024-03-13 ENCOUNTER — OFFICE VISIT (OUTPATIENT)
Dept: CARDIOLOGY | Facility: CLINIC | Age: 69
End: 2024-03-13
Payer: MEDICARE

## 2024-03-13 ENCOUNTER — HOSPITAL ENCOUNTER (OUTPATIENT)
Dept: CARDIOLOGY | Facility: HOSPITAL | Age: 69
Discharge: HOME | End: 2024-03-13
Payer: MEDICARE

## 2024-03-13 VITALS
SYSTOLIC BLOOD PRESSURE: 143 MMHG | DIASTOLIC BLOOD PRESSURE: 86 MMHG | BODY MASS INDEX: 35.44 KG/M2 | WEIGHT: 200 LBS | HEIGHT: 63 IN

## 2024-03-13 VITALS
DIASTOLIC BLOOD PRESSURE: 78 MMHG | SYSTOLIC BLOOD PRESSURE: 158 MMHG | HEIGHT: 63 IN | BODY MASS INDEX: 37.56 KG/M2 | WEIGHT: 212 LBS | OXYGEN SATURATION: 95 % | HEART RATE: 80 BPM

## 2024-03-13 DIAGNOSIS — I50.22 CHRONIC SYSTOLIC HEART FAILURE: Primary | Chronic | ICD-10-CM

## 2024-03-13 DIAGNOSIS — I50.22 CHRONIC SYSTOLIC HEART FAILURE: Chronic | ICD-10-CM

## 2024-03-13 DIAGNOSIS — I10 ESSENTIAL HYPERTENSION: Chronic | ICD-10-CM

## 2024-03-13 PROBLEM — I48.0 PAROXYSMAL ATRIAL FIBRILLATION: Status: RESOLVED | Noted: 2023-12-08 | Resolved: 2024-03-13

## 2024-03-13 LAB
BH CV ECHO MEAS - AO MAX PG: 6.7 MMHG
BH CV ECHO MEAS - AO MEAN PG: 4 MMHG
BH CV ECHO MEAS - AO ROOT DIAM: 2.9 CM
BH CV ECHO MEAS - AO V2 MAX: 129 CM/SEC
BH CV ECHO MEAS - AO V2 VTI: 25 CM
BH CV ECHO MEAS - AVA(I,D): 1.66 CM2
BH CV ECHO MEAS - EDV(CUBED): 157.5 ML
BH CV ECHO MEAS - EDV(MOD-SP2): 95 ML
BH CV ECHO MEAS - EDV(MOD-SP4): 118 ML
BH CV ECHO MEAS - EF(MOD-BP): 35.3 %
BH CV ECHO MEAS - EF(MOD-SP2): 33.6 %
BH CV ECHO MEAS - EF(MOD-SP4): 37.5 %
BH CV ECHO MEAS - ESV(CUBED): 74.1 ML
BH CV ECHO MEAS - ESV(MOD-SP2): 63.1 ML
BH CV ECHO MEAS - ESV(MOD-SP4): 73.7 ML
BH CV ECHO MEAS - FS: 22.2 %
BH CV ECHO MEAS - IVS/LVPW: 1 CM
BH CV ECHO MEAS - IVSD: 1 CM
BH CV ECHO MEAS - LA DIMENSION: 4.9 CM
BH CV ECHO MEAS - LAT PEAK E' VEL: 6.1 CM/SEC
BH CV ECHO MEAS - LV DIASTOLIC VOL/BSA (35-75): 61 CM2
BH CV ECHO MEAS - LV MASS(C)D: 206.7 GRAMS
BH CV ECHO MEAS - LV MAX PG: 2.27 MMHG
BH CV ECHO MEAS - LV MEAN PG: 1 MMHG
BH CV ECHO MEAS - LV SYSTOLIC VOL/BSA (12-30): 38.1 CM2
BH CV ECHO MEAS - LV V1 MAX: 75.4 CM/SEC
BH CV ECHO MEAS - LV V1 VTI: 14.6 CM
BH CV ECHO MEAS - LVIDD: 5.4 CM
BH CV ECHO MEAS - LVIDS: 4.2 CM
BH CV ECHO MEAS - LVOT AREA: 2.8 CM2
BH CV ECHO MEAS - LVOT DIAM: 1.9 CM
BH CV ECHO MEAS - LVPWD: 1 CM
BH CV ECHO MEAS - MED PEAK E' VEL: 3.2 CM/SEC
BH CV ECHO MEAS - MV A MAX VEL: 58.2 CM/SEC
BH CV ECHO MEAS - MV DEC SLOPE: 664 CM/SEC2
BH CV ECHO MEAS - MV DEC TIME: 0.16 SEC
BH CV ECHO MEAS - MV E MAX VEL: 105 CM/SEC
BH CV ECHO MEAS - MV E/A: 1.8
BH CV ECHO MEAS - MV MAX PG: 6 MMHG
BH CV ECHO MEAS - MV MEAN PG: 2 MMHG
BH CV ECHO MEAS - MV V2 VTI: 27.5 CM
BH CV ECHO MEAS - MVA(VTI): 1.51 CM2
BH CV ECHO MEAS - PA V2 MAX: 103 CM/SEC
BH CV ECHO MEAS - PI END-D VEL: 186 CM/SEC
BH CV ECHO MEAS - PULM A REVS DUR: 0.11 SEC
BH CV ECHO MEAS - PULM A REVS VEL: 17.9 CM/SEC
BH CV ECHO MEAS - PULM DIAS VEL: 59.2 CM/SEC
BH CV ECHO MEAS - PULM S/D: 0.55
BH CV ECHO MEAS - PULM SYS VEL: 32.5 CM/SEC
BH CV ECHO MEAS - RV MAX PG: 2.37 MMHG
BH CV ECHO MEAS - RV V1 MAX: 76.9 CM/SEC
BH CV ECHO MEAS - RV V1 VTI: 11 CM
BH CV ECHO MEAS - RVDD: 4.2 CM
BH CV ECHO MEAS - SI(MOD-SP2): 16.5 ML/M2
BH CV ECHO MEAS - SI(MOD-SP4): 22.9 ML/M2
BH CV ECHO MEAS - SV(LVOT): 41.4 ML
BH CV ECHO MEAS - SV(MOD-SP2): 31.9 ML
BH CV ECHO MEAS - SV(MOD-SP4): 44.3 ML
BH CV ECHO MEAS - TAPSE (>1.6): 1.61 CM
BH CV ECHO MEAS - TR MAX PG: 32.7 MMHG
BH CV ECHO MEAS - TR MAX VEL: 286 CM/SEC
BH CV ECHO MEASUREMENTS AVERAGE E/E' RATIO: 22.58
BH CV XLRA - RV BASE: 3.5 CM
BH CV XLRA - RV LENGTH: 6.2 CM
BH CV XLRA - RV MID: 3.2 CM
BH CV XLRA - TDI S': 8.5 CM/SEC
LEFT ATRIUM VOLUME INDEX: 58 ML/M2
LEFT ATRIUM VOLUME: 113 ML

## 2024-03-13 PROCEDURE — 93306 TTE W/DOPPLER COMPLETE: CPT

## 2024-03-13 RX ORDER — CARVEDILOL 6.25 MG/1
6.25 TABLET ORAL 2 TIMES DAILY
Qty: 180 TABLET | Refills: 3 | Status: SHIPPED | OUTPATIENT
Start: 2024-03-13

## 2024-03-13 NOTE — PROGRESS NOTES
Subjective:     Encounter Date:03/13/2024      Patient ID: Damaris Garcias is a 68 y.o. female.    Chief Complaint: follow-up CHF, SVT.      History of Present Illness    Mrs. Garcias is a 68-year-old female who was initially referred to me on 12/13/2023 with chronic systolic heart failure and a nuclear stress test at another facility in 2017 reporting an EF as low as 27%, but another abnormal stress test in 2021 leading to a heart catheterization done elsewhere that was reportedly normal. There was also mention of atrial fibrillation and so she had empirically been treated with Eliquis prior to meeting me. I reviewed all the available records from [facility] and never actually saw any confirmation of atrial fibrillation and therefore, ultimately, we did recommend that she stop taking Eliquis. Furthermore, she had had significant blood loss anemia when taking that for a DVT and PE in 2021, so I felt as though the risk of continuing the medication, particularly without a firm diagnosis of atrial fibrillation, outweigh any benefit. We did proceed with a loop recorder implant just to make sure that there were no occult arrhythmias justifying anticoagulation. Also, at the time of our first visit, she was euvolemic and NYHA 1. She is on appropriate guideline directed therapy for the history of reportedly low EF on Entresto, Aldactone, perceived metoprolol. She was struggling mildly with fluctuating blood pressures, which I suspected was more secondary to autonomic dysfunction. I do not recommend any changes in these medications. She was on clonidine 3 times a day, so since she was complaining of fluctuating blood pressures, I did advise that we start weaning off of this, and if blood pressure started to elevate, then we could increase some of her other medications. Lastly, we had discussed repeating an echocardiogram in about 3 months after hopefully having weaned her off of clonidine to reassess her LVEF. She is  "accompanied by an adult male.    Today, she states that her loop recorder has been doing well. She reports that her blood pressure has been elevated to 220/120 mmHg. She notes that she went to her primary care doctor on Monday, 03/11/2024, who made her go home with 0.2 mg of clonidine and not do anything, wait an hour, and take her blood pressure, and it went down. She was told that if she takes clonidine when she goes to bed at night, and it is over 200 systolic, that she should go to the emergency room. She mentions that her primary care provider thought about changing the metoprolol to carvedilol, but she did not want to send her home on a new medicine with her pressure elevated. She states that she was put on a higher dose of Entresto, but she would pass out, and it was decreased down again.     The adult male inquires what could cause her blood pressure to go up when she is just walking from where she is at to sit in her chair and then drop back down normal. She states that she feels out of breath, and sometimes she feels like her heart is going to \"beat down in her chest\". Her heart rate will be 118 to 120 BPM. She was on Farxiga 10 mg, but Dr. Weber cut it back to 5 mg. She cut her metoprolol back to 50 mg twice a day. She denies any pain, passing out, or feeling orthopnea when she lays down or experiences a sensation of having to sit up to breathe. She denies any weight or fluid fluctuations. Her blood pressure at rest was good at 123/70 mmHg; however, every now and then, it would jump to 140 for systolic pressure.     Dr. Jasso did a heart catheterization on her in 2021, which she was told was normal. She denies any tightness, pressure, or heaviness when she walks.    She states that she is no longer using a walker for ambulation.     The following portions of the patient's history were reviewed and updated as appropriate: allergies, current medications, past family history, past medical history, past " social history, past surgical history, and problem list.    Review of Systems   Constitutional: Negative for malaise/fatigue.   Cardiovascular:  Positive for dyspnea on exertion and palpitations (with exertion.). Negative for chest pain, claudication, leg swelling, near-syncope, orthopnea, paroxysmal nocturnal dyspnea and syncope.   Respiratory:  Positive for shortness of breath.    Hematologic/Lymphatic: Does not bruise/bleed easily.         Current Outpatient Medications:     acetaminophen (TYLENOL) 500 MG tablet, Take 2 tablets by mouth Every 6 (Six) Hours As Needed for Mild Pain. Takes about twice a week, Disp: , Rfl:     atorvastatin (LIPITOR) 20 MG tablet, Take 1 tablet by mouth Daily., Disp: , Rfl:     cloNIDine (CATAPRES) 0.1 MG tablet, Take 1 tablet by mouth 3 times a day. (Patient taking differently: Take 2 tablets by mouth 3 times a day.), Disp: 90 tablet, Rfl: 3    dapagliflozin (FARXIGA) 5 MG tablet tablet, Take 1 tablet by mouth Daily., Disp: , Rfl:     diclofenac (VOLTAREN) 75 MG EC tablet, Take 1 tablet by mouth Daily As Needed (joint pain). Has been taking at least once, usually twice a week, Disp: , Rfl:     Dulaglutide (Trulicity) 0.75 MG/0.5ML solution pen-injector, Inject 1.5 mg under the skin into the appropriate area as directed 1 (One) Time Per Week. on Thurs, Disp: , Rfl:     Entresto 24-26 MG tablet, Take 1 tablet by mouth 2 (Two) Times a Day., Disp: , Rfl:     furosemide (LASIX) 20 MG tablet, TAKE 1 TABLET BY MOUTH DAILY AS NEEDED (SWELLING)., Disp: 90 tablet, Rfl: 1    glipizide (GLUCOTROL) 10 MG tablet, Take 1 tablet by mouth 2 (Two) Times a Day Before Meals., Disp: , Rfl:     insulin glargine (LANTUS, SEMGLEE) 100 UNIT/ML injection, Inject 10 Units under the skin into the appropriate area as directed Every Night., Disp: , Rfl:     Insulin Lispro (humaLOG) 100 UNIT/ML injection, Inject 10 Units under the skin into the appropriate area as directed 3 (Three) Times a Day As Needed for High  Blood Sugar. *was given after hospital discharge in 10/2023- usually doesn't take-only takes if BG over 250, Disp: , Rfl:     montelukast (SINGULAIR) 10 MG tablet, Take 1 tablet by mouth Every Night., Disp: , Rfl:     nystatin (MYCOSTATIN) 445593 UNIT/GM powder, Apply 1 Application topically to the appropriate area as directed 4 (Four) Times a Day As Needed., Disp: , Rfl:     pantoprazole (PROTONIX) 40 MG EC tablet, Take 1 tablet by mouth Daily., Disp: , Rfl:     promethazine (PHENERGAN) 25 MG tablet, Take 1 tablet by mouth Every 6 (Six) Hours As Needed for Nausea or Vomiting., Disp: , Rfl:     spironolactone (ALDACTONE) 25 MG tablet, Take 1 tablet by mouth., Disp: , Rfl:     sucralfate (CARAFATE) 1 g tablet, Take 1 tablet by mouth 3 (Three) Times a Day Before Meals., Disp: , Rfl:     TiZANidine (ZANAFLEX) 4 MG capsule, Take 1 capsule by mouth 3 (Three) Times a Day As Needed for Muscle Spasms. Usually takes twice a day, Disp: , Rfl:     traMADol (ULTRAM) 50 MG tablet, Take 1 tablet by mouth 2 (Two) Times a Day As Needed for Moderate Pain., Disp: , Rfl:     vitamin D (ERGOCALCIFEROL) 1.25 MG (81198 UT) capsule capsule, Take 1 capsule by mouth 2 (Two) Times a Week., Disp: , Rfl:     zolpidem (AMBIEN) 10 MG tablet, Take 1 tablet by mouth every night at bedtime. EVERY NIGHT, Disp: , Rfl: 1    carvedilol (COREG) 6.25 MG tablet, Take 1 tablet by mouth 2 (Two) Times a Day., Disp: 180 tablet, Rfl: 3       Objective:      Vitals:    03/13/24 1312   BP: 158/78   Pulse: 80   SpO2: 95%     Vitals and nursing note reviewed.   Constitutional:       General: Not in acute distress.     Appearance: Not in distress.   Neck:      Vascular: No JVD or JVR. JVD normal.   Pulmonary:      Effort: Pulmonary effort is normal.      Breath sounds: Normal breath sounds.   Cardiovascular:      Normal rate. Regular rhythm.      Murmurs: There is a grade 2/6 systolic murmur. Over the left sternal border.       No gallop.  No rub.   Pulses:      Intact distal pulses.   Edema:     Peripheral edema absent.   Skin:     General: Skin is warm and dry.   Neurological:      Mental Status: Alert, oriented to person, place, and time and oriented to person, place and time.     Lab Review:         ECG 12 Lead    Date/Time: 3/13/2024 1:24 PM  Performed by: Joseph Sawyer MD    Authorized by: Joseph Sawyer MD  Comparison: compared with previous ECG from 12/13/2023  Similar to previous ECG  Rhythm: sinus rhythm  BPM: 80  Conduction: left bundle branch block  QRS axis: left    Clinical impression: abnormal EKG          Assessment/Plan:     Problem List Items Addressed This Visit          Cardiac and Vasculature    Essential hypertension (Chronic): still poorly controlled, and widely fluctuating with numerous issues with medications    Relevant Medications    carvedilol (COREG) 6.25 MG tablet    Chronic systolic heart failure - Primary (Chronic): stable    Overview     EF 27% 2017 nuclear stress  EF abnormal stress 2021, reportedly normal cath  EF 40%, severe SHANON, moderate LAE, moderate to severe RVE, mild MR, severe TR (RVSP 45 mm Hg), mild PI, physiologic pericardial effusion 3/2021 echo, Dr. Jasso, [facility]  EF 60-65% 6/2023 echo, Dr. Hernandez, [facility]  EF 46-52% 10/2023 echo, Dr. Azevedo, [facility]             Relevant Medications    carvedilol (COREG) 6.25 MG tablet         Recommendations/plans:    Overall, Mrs. Garcias is doing very well from a clinical perspective, but is still having significant exertional dyspnea with palpitations. When I saw her in December '23, we advised, primarily driven by complaints of wildly fluctuating blood pressures, to start weaning off clonidine, and that we would increase doses of her chronic guideline directed medical therapies for chronic systolic heart failure if needed for better blood pressure control. She actually reports she had done well by reducing the clonidine dose to 0.1mg 3 times daily noting resting systolic pressures in the  "120s, and 130s however, she has noticed that when she walks, and gets out of breath, and then measures her blood pressure is significantly elevated. Today, she does tell me that she voiced this concern to her PCP who then increased her clonidine dose back up to 0.2mg 3 times daily, and advised her to go to the ER if she had higher blood pressure.     We reviewed the physiology of both heart rate, and blood pressure today with activity, the fact that her blood pressure spikes up when walking, and getting out of breath is not a \"blood pressure problem.\" Rather, it is a sign of either deconditioning or some other organic issue causing the symptom, and the blood pressure spikes up as a result.  Blood pressure medication changes should be driven primarily by what the blood pressure is at rest, and has actually been doing quite well of late. However, further discussion of the blood pressure today reveals that she is taking 50 mg twice daily of metoprolol succinate. This is the extended-release form, and she is cutting 100 mg tablet in half to take the 50 mg twice daily. We discussed the fact that the pharmacokinetic properties of the drug are completely unpredictable when cutting an extended-release pill, and this is also likely contributing to some of her fluctuations. Therefore, at this point, we will take the opportunity to discontinue the metoprolol altogether, and switched to carvedilol at 6.25 mg twice daily. For now, I did advise for her to continue the 0.2 mg of clonidine three times daily that her PCP recently increased her back to, but did again expressed to her that ultimately our goal should be to wean off this, and discontinue it altogether.     Otherwise, in the meantime, continue the other guideline directed therapies for systolic failure including low dose Entresto (noting that she has been intolerant of any higher dose in the past with syncope, and low blood pressure), Spironolactone 25 daily, and Farxiga " (though she is on incorrect CHF dose of 5 mg daily).    I have asked her to call us back in a week to let us know how her blood pressure is running with the carvedilol, and we can make further adjustments then by phone. Again, ultimately, would like to wean her off clonidine, but we will not do that yet. Once we get her resting pressures more stabilized on carvedilol, then we will start to wean the clonidine again.     Also in the future, we will plan on switching her Farxiga from 5 mg to the appropriate CHF indicated dose of 10 mg daily.     Lastly, her echocardiogram was done this morning to follow up on her EF. This has not been officially reviewed yet, but my preliminary review in the room suggests LV systolic dysfunction with EF approximately 40 to 45%. It is unclear to me whether this is the same as it had improved to back in 2021, or if it had actually gotten janelle and now it's slightly worse again. If so, I will say that she is not manifesting any other signs or symptoms of congestive heart failure other than exertional dyspnea, she has no volume overload on exam, and no other symptoms to suggest central volume overload by history.     She has undergone catheterization several years ago for LV dysfunction, and was reportedly told there was no problems, and given the persistent LV dysfunction noted today, I will seek first to obtain a image of that catheterization for my review. If there is mild or moderate disease noted there, we may need to suggest repeating another ischemic evaluation. If the arteries are completely normal, then we will not need to repeat another ischemic evaluation, but continue more aggressive medical titration of guideline directed therapies as it certainly may be that her exertional dyspnea is simply a combination of this point of deconditioning, LV dysfunction, hypertension, and mitral regurgitation, all of which worsen with activity, and produce more dyspnea.     Otherwise, we will  continue routine surveillance monitoring of her rhythm through the loop recorder, which as of yet, has not revealed any arrhythmias. Therefore, she appropriately remains off Eliquis at this point in time. Plan for follow-up in 3 to 6 months.    Transcribed from ambient dictation for Joseph Sawyer MD by Sierra Mcmanus.  03/13/24   16:31 CDT    Patient or patient representative verbalized consent to the visit recording.  I Joseph Sawyer MD have personally performed the services described in this document as scribed by the above individual, and it is both accurate and complete.   I have edited each component as needed.    32 minutes spent on day of service    Joseph Sawyer MD  3/14/2024  19:01 CDT

## 2024-03-21 LAB
BH CV ECHO MEAS - AO MAX PG: 6.7 MMHG
BH CV ECHO MEAS - AO MEAN PG: 4 MMHG
BH CV ECHO MEAS - AO ROOT DIAM: 2.9 CM
BH CV ECHO MEAS - AO V2 MAX: 129 CM/SEC
BH CV ECHO MEAS - AO V2 VTI: 25 CM
BH CV ECHO MEAS - AVA(I,D): 1.66 CM2
BH CV ECHO MEAS - EDV(CUBED): 157.5 ML
BH CV ECHO MEAS - EDV(MOD-SP2): 95 ML
BH CV ECHO MEAS - EDV(MOD-SP4): 118 ML
BH CV ECHO MEAS - EF(MOD-BP): 35.3 %
BH CV ECHO MEAS - EF(MOD-SP2): 33.6 %
BH CV ECHO MEAS - EF(MOD-SP4): 37.5 %
BH CV ECHO MEAS - ESV(CUBED): 74.1 ML
BH CV ECHO MEAS - ESV(MOD-SP2): 63.1 ML
BH CV ECHO MEAS - ESV(MOD-SP4): 73.7 ML
BH CV ECHO MEAS - FS: 22.2 %
BH CV ECHO MEAS - IVS/LVPW: 1 CM
BH CV ECHO MEAS - IVSD: 1 CM
BH CV ECHO MEAS - LA DIMENSION: 4.9 CM
BH CV ECHO MEAS - LAT PEAK E' VEL: 6.1 CM/SEC
BH CV ECHO MEAS - LV DIASTOLIC VOL/BSA (35-75): 61 CM2
BH CV ECHO MEAS - LV MASS(C)D: 206.7 GRAMS
BH CV ECHO MEAS - LV MAX PG: 2.27 MMHG
BH CV ECHO MEAS - LV MEAN PG: 1 MMHG
BH CV ECHO MEAS - LV SYSTOLIC VOL/BSA (12-30): 38.1 CM2
BH CV ECHO MEAS - LV V1 MAX: 75.4 CM/SEC
BH CV ECHO MEAS - LV V1 VTI: 14.6 CM
BH CV ECHO MEAS - LVIDD: 5.4 CM
BH CV ECHO MEAS - LVIDS: 4.2 CM
BH CV ECHO MEAS - LVOT AREA: 2.8 CM2
BH CV ECHO MEAS - LVOT DIAM: 1.9 CM
BH CV ECHO MEAS - LVPWD: 1 CM
BH CV ECHO MEAS - MED PEAK E' VEL: 3.2 CM/SEC
BH CV ECHO MEAS - MV A MAX VEL: 58.2 CM/SEC
BH CV ECHO MEAS - MV DEC SLOPE: 664 CM/SEC2
BH CV ECHO MEAS - MV DEC TIME: 0.16 SEC
BH CV ECHO MEAS - MV E MAX VEL: 105 CM/SEC
BH CV ECHO MEAS - MV E/A: 1.8
BH CV ECHO MEAS - MV MAX PG: 6 MMHG
BH CV ECHO MEAS - MV MEAN PG: 2 MMHG
BH CV ECHO MEAS - MV V2 VTI: 27.5 CM
BH CV ECHO MEAS - MVA(VTI): 1.51 CM2
BH CV ECHO MEAS - PA V2 MAX: 103 CM/SEC
BH CV ECHO MEAS - PI END-D VEL: 186 CM/SEC
BH CV ECHO MEAS - PULM A REVS DUR: 0.11 SEC
BH CV ECHO MEAS - PULM A REVS VEL: 17.9 CM/SEC
BH CV ECHO MEAS - PULM DIAS VEL: 59.2 CM/SEC
BH CV ECHO MEAS - PULM S/D: 0.55
BH CV ECHO MEAS - PULM SYS VEL: 32.5 CM/SEC
BH CV ECHO MEAS - RV MAX PG: 2.37 MMHG
BH CV ECHO MEAS - RV V1 MAX: 76.9 CM/SEC
BH CV ECHO MEAS - RV V1 VTI: 11 CM
BH CV ECHO MEAS - RVDD: 4.2 CM
BH CV ECHO MEAS - SI(MOD-SP2): 16.5 ML/M2
BH CV ECHO MEAS - SI(MOD-SP4): 22.9 ML/M2
BH CV ECHO MEAS - SV(LVOT): 41.4 ML
BH CV ECHO MEAS - SV(MOD-SP2): 31.9 ML
BH CV ECHO MEAS - SV(MOD-SP4): 44.3 ML
BH CV ECHO MEAS - TAPSE (>1.6): 1.61 CM
BH CV ECHO MEAS - TR MAX PG: 32.7 MMHG
BH CV ECHO MEAS - TR MAX VEL: 286 CM/SEC
BH CV ECHO MEASUREMENTS AVERAGE E/E' RATIO: 22.58
BH CV XLRA - RV BASE: 3.5 CM
BH CV XLRA - RV LENGTH: 6.2 CM
BH CV XLRA - RV MID: 3.2 CM
BH CV XLRA - TDI S': 8.5 CM/SEC
LEFT ATRIUM VOLUME INDEX: 58 ML/M2
LEFT ATRIUM VOLUME: 113 ML
LV EF 2D ECHO EST: 40 %

## 2024-04-11 ENCOUNTER — LAB (OUTPATIENT)
Dept: LAB | Facility: HOSPITAL | Age: 69
End: 2024-04-11
Payer: MEDICARE

## 2024-04-11 ENCOUNTER — OFFICE VISIT (OUTPATIENT)
Dept: CARDIOLOGY | Facility: CLINIC | Age: 69
End: 2024-04-11
Payer: MEDICARE

## 2024-04-11 VITALS
HEART RATE: 96 BPM | BODY MASS INDEX: 37.39 KG/M2 | OXYGEN SATURATION: 98 % | HEIGHT: 63 IN | SYSTOLIC BLOOD PRESSURE: 178 MMHG | DIASTOLIC BLOOD PRESSURE: 108 MMHG | WEIGHT: 211 LBS

## 2024-04-11 DIAGNOSIS — R00.2 PALPITATIONS: ICD-10-CM

## 2024-04-11 DIAGNOSIS — I50.22 CHRONIC SYSTOLIC HEART FAILURE: Chronic | ICD-10-CM

## 2024-04-11 DIAGNOSIS — R07.9 CHEST PAIN, UNSPECIFIED TYPE: ICD-10-CM

## 2024-04-11 DIAGNOSIS — R06.09 DOE (DYSPNEA ON EXERTION): ICD-10-CM

## 2024-04-11 DIAGNOSIS — I50.22 CHRONIC SYSTOLIC HEART FAILURE: Primary | Chronic | ICD-10-CM

## 2024-04-11 DIAGNOSIS — I10 ESSENTIAL HYPERTENSION: Chronic | ICD-10-CM

## 2024-04-11 LAB
ALBUMIN SERPL-MCNC: 4.1 G/DL (ref 3.5–5.2)
ALBUMIN/GLOB SERPL: 1.2 G/DL
ALP SERPL-CCNC: 91 U/L (ref 39–117)
ALT SERPL W P-5'-P-CCNC: 13 U/L (ref 1–33)
ANION GAP SERPL CALCULATED.3IONS-SCNC: 12 MMOL/L (ref 5–15)
AST SERPL-CCNC: 13 U/L (ref 1–32)
BILIRUB SERPL-MCNC: 0.3 MG/DL (ref 0–1.2)
BUN SERPL-MCNC: 21 MG/DL (ref 8–23)
BUN/CREAT SERPL: 30.4 (ref 7–25)
CALCIUM SPEC-SCNC: 9.1 MG/DL (ref 8.6–10.5)
CHLORIDE SERPL-SCNC: 103 MMOL/L (ref 98–107)
CO2 SERPL-SCNC: 24 MMOL/L (ref 22–29)
CREAT SERPL-MCNC: 0.69 MG/DL (ref 0.57–1)
EGFRCR SERPLBLD CKD-EPI 2021: 95.3 ML/MIN/1.73
GLOBULIN UR ELPH-MCNC: 3.5 GM/DL
GLUCOSE SERPL-MCNC: 201 MG/DL (ref 65–99)
NT-PROBNP SERPL-MCNC: 8369 PG/ML (ref 0–900)
POTASSIUM SERPL-SCNC: 4.3 MMOL/L (ref 3.5–5.2)
PROT SERPL-MCNC: 7.6 G/DL (ref 6–8.5)
SODIUM SERPL-SCNC: 139 MMOL/L (ref 136–145)

## 2024-04-11 PROCEDURE — 83880 ASSAY OF NATRIURETIC PEPTIDE: CPT

## 2024-04-11 PROCEDURE — 80053 COMPREHEN METABOLIC PANEL: CPT

## 2024-04-11 PROCEDURE — 36415 COLL VENOUS BLD VENIPUNCTURE: CPT

## 2024-04-11 PROCEDURE — 3080F DIAST BP >= 90 MM HG: CPT | Performed by: INTERNAL MEDICINE

## 2024-04-11 PROCEDURE — 1160F RVW MEDS BY RX/DR IN RCRD: CPT | Performed by: INTERNAL MEDICINE

## 2024-04-11 PROCEDURE — 93000 ELECTROCARDIOGRAM COMPLETE: CPT | Performed by: INTERNAL MEDICINE

## 2024-04-11 PROCEDURE — 3077F SYST BP >= 140 MM HG: CPT | Performed by: INTERNAL MEDICINE

## 2024-04-11 PROCEDURE — 99214 OFFICE O/P EST MOD 30 MIN: CPT | Performed by: INTERNAL MEDICINE

## 2024-04-11 PROCEDURE — 1159F MED LIST DOCD IN RCRD: CPT | Performed by: INTERNAL MEDICINE

## 2024-04-11 RX ORDER — SODIUM CHLORIDE 0.9 % (FLUSH) 0.9 %
10 SYRINGE (ML) INJECTION EVERY 12 HOURS SCHEDULED
OUTPATIENT
Start: 2024-04-11

## 2024-04-11 RX ORDER — SODIUM CHLORIDE 0.9 % (FLUSH) 0.9 %
10 SYRINGE (ML) INJECTION AS NEEDED
OUTPATIENT
Start: 2024-04-11

## 2024-04-11 RX ORDER — NITROGLYCERIN 0.4 MG/1
0.4 TABLET SUBLINGUAL
OUTPATIENT
Start: 2024-04-11

## 2024-04-11 RX ORDER — CARVEDILOL 6.25 MG/1
12.5 TABLET ORAL 2 TIMES DAILY
Qty: 180 TABLET | Refills: 3 | Status: SHIPPED | OUTPATIENT
Start: 2024-04-11

## 2024-04-11 RX ORDER — SODIUM CHLORIDE 9 MG/ML
40 INJECTION, SOLUTION INTRAVENOUS AS NEEDED
OUTPATIENT
Start: 2024-04-11

## 2024-04-11 NOTE — PROGRESS NOTES
Subjective:     Encounter Date:04/11/2024      Patient ID: Damaris Garcias is a 69 y.o. female.    Chief Complaint: f/u CHF, SVT  History of Present Illness  The patient presents for evaluation of multiple medical concerns. She is accompanied by an adult male.    The patient has been adhering to a regimen of 6.25 mg carvedilol twice daily due to persistently elevated blood pressure, exceeding 200 nightly before sleep. Despite this, her blood pressure remains elevated, averaging around 150/97 during the day. A recent episode of hypotension was noted at 97/40, immediately post-administration of her morning medication, including clonidine, which resulted in a decrease in her blood pressure. However, by bedtime, her blood pressure significantly increased. She experiences dyspnea during ambulation, which is less severe than during ambulation. Despite this, her blood pressure remains elevated. She underwent a heart catheterization performed by Dr. Jasso in 2021. She was previously on losartan, which was later replaced with Procardia. Her Jardiance was discontinued and replaced with Farxiga, prescribed by Dr. Azevedo. She recalls that Nancy reduced her clonidine dosage to 1 and increased her Entresto dosage, which resulted in functional impairment. She continues to take clonidine 0.2 mg every 8 hours. She denies any significant changes in edema or rapid weight gain.  Mrs. Garcias is a 68-year-old female who was initially referred to me on 12/13/2023 with chronic systolic heart failure and a nuclear stress test at another facility in 2017 reporting an EF as low as 27%, but another abnormal stress test in 2021 leading to a heart catheterization done elsewhere that was reportedly normal. There was also mention of atrial fibrillation and so she had empirically been treated with Eliquis prior to meeting me. I reviewed all the available records from [facility] and never actually saw any confirmation of atrial  fibrillation and therefore, ultimately, we did recommend that she stop taking Eliquis. Furthermore, she had had significant blood loss anemia when taking that for a DVT and PE in 2021, so I felt as though the risk of continuing the medication, particularly without a firm diagnosis of atrial fibrillation, outweigh any benefit. We did proceed with a loop recorder implant just to make sure that there were no occult arrhythmias justifying anticoagulation. Also, at the time of our first visit, she was euvolemic and NYHA 1. She is on appropriate guideline directed therapy for the history of reportedly low EF on Entresto, Aldactone, perceived metoprolol. She was struggling mildly with fluctuating blood pressures, which I suspected was more secondary to autonomic dysfunction. I do not recommend any changes in these medications. She was on clonidine 3 times a day, so since she was complaining of fluctuating blood pressures, I did advise that we start weaning off of this, and if blood pressure started to elevate, then we could increase some of her other medications. Lastly, we had discussed repeating an echocardiogram in about 3 months after hopefully having weaned her off of clonidine to reassess her LVEF     When last seen here on 3/13/2024, she was doing well from a clinical perspective, but still having significant exertional dyspnea and associated palpitations.  Unfortunately, my previous recommendations regarding weaning down on clonidine to ultimately achieve a more stable blood pressure had been reversed by her PCP, who had actually increased the clonidine back to a dose of 0.2 mg 3 times daily with before my last visit with her.  Further history obtained from the patient revealed that all of these changes are being made in response to reports of blood pressure readings obtained easily after exerting herself, so we discussed the fact that chronic daily medication should not be adjusted on this basis, but rather what  her blood pressure was running while at rest.  I did switch her from metoprolol to carvedilol 6.25 twice daily at that point, and advised to continue the clonidine as prescribed by her PCP for the time being.  I asked her to call us back in a week to let us know how her blood pressure was running on the Coreg, and that we would start to wean clonidine slowly in the near future.  Also mention switching her from 5 mg of Farxiga daily to 10 mg daily in the future, given the CHF.  Echocardiogram done the day of that visit showed EF 36 to 40%.  We tried to obtain the actual images from the catheterization and Dr. Jasso reportedly did there in 2021 but have been told by the facility of those images could not be obtained.    The following portions of the patient's history were reviewed and updated as appropriate: allergies, current medications, past family history, past medical history, past social history, past surgical history, and problem list.    Review of Systems   Constitutional: Negative for malaise/fatigue.   Cardiovascular:  Positive for chest pain and dyspnea on exertion. Negative for claudication, leg swelling, near-syncope, orthopnea, palpitations, paroxysmal nocturnal dyspnea and syncope.   Respiratory:  Positive for shortness of breath.    Hematologic/Lymphatic: Does not bruise/bleed easily.           Current Outpatient Medications:   •  acetaminophen (TYLENOL) 500 MG tablet, Take 2 tablets by mouth Every 6 (Six) Hours As Needed for Mild Pain. Takes about twice a week, Disp: , Rfl:   •  atorvastatin (LIPITOR) 20 MG tablet, Take 1 tablet by mouth Daily., Disp: , Rfl:   •  carvedilol (COREG) 6.25 MG tablet, Take 1 tablet by mouth 2 (Two) Times a Day., Disp: 180 tablet, Rfl: 3  •  cloNIDine (CATAPRES) 0.1 MG tablet, Take 1 tablet by mouth 3 times a day. (Patient taking differently: Take 2 tablets by mouth 3 times a day.), Disp: 90 tablet, Rfl: 3  •  dapagliflozin (FARXIGA) 5 MG tablet tablet, Take 1 tablet by  mouth Daily., Disp: , Rfl:   •  diclofenac (VOLTAREN) 75 MG EC tablet, Take 1 tablet by mouth Daily As Needed (joint pain). Has been taking at least once, usually twice a week, Disp: , Rfl:   •  Dulaglutide (Trulicity) 0.75 MG/0.5ML solution pen-injector, Inject 1.5 mg under the skin into the appropriate area as directed 1 (One) Time Per Week. on Thurs, Disp: , Rfl:   •  Entresto 24-26 MG tablet, Take 1 tablet by mouth 2 (Two) Times a Day., Disp: , Rfl:   •  furosemide (LASIX) 20 MG tablet, TAKE 1 TABLET BY MOUTH DAILY AS NEEDED (SWELLING)., Disp: 90 tablet, Rfl: 1  •  glipizide (GLUCOTROL) 10 MG tablet, Take 1 tablet by mouth 2 (Two) Times a Day Before Meals., Disp: , Rfl:   •  insulin glargine (LANTUS, SEMGLEE) 100 UNIT/ML injection, Inject 10 Units under the skin into the appropriate area as directed Every Night., Disp: , Rfl:   •  Insulin Lispro (humaLOG) 100 UNIT/ML injection, Inject 10 Units under the skin into the appropriate area as directed 3 (Three) Times a Day As Needed for High Blood Sugar. *was given after hospital discharge in 10/2023- usually doesn't take-only takes if BG over 250, Disp: , Rfl:   •  montelukast (SINGULAIR) 10 MG tablet, Take 1 tablet by mouth Every Night., Disp: , Rfl:   •  nystatin (MYCOSTATIN) 243995 UNIT/GM powder, Apply 1 Application topically to the appropriate area as directed 4 (Four) Times a Day As Needed., Disp: , Rfl:   •  pantoprazole (PROTONIX) 40 MG EC tablet, Take 1 tablet by mouth Daily., Disp: , Rfl:   •  promethazine (PHENERGAN) 25 MG tablet, Take 1 tablet by mouth Every 6 (Six) Hours As Needed for Nausea or Vomiting., Disp: , Rfl:   •  spironolactone (ALDACTONE) 25 MG tablet, Take 1 tablet by mouth., Disp: , Rfl:   •  sucralfate (CARAFATE) 1 g tablet, Take 1 tablet by mouth 3 (Three) Times a Day Before Meals., Disp: , Rfl:   •  TiZANidine (ZANAFLEX) 4 MG capsule, Take 1 capsule by mouth 3 (Three) Times a Day As Needed for Muscle Spasms. Usually takes twice a day,  Disp: , Rfl:   •  traMADol (ULTRAM) 50 MG tablet, Take 1 tablet by mouth 2 (Two) Times a Day As Needed for Moderate Pain., Disp: , Rfl:   •  vitamin D (ERGOCALCIFEROL) 1.25 MG (00316 UT) capsule capsule, Take 1 capsule by mouth 2 (Two) Times a Week., Disp: , Rfl:   •  zolpidem (AMBIEN) 10 MG tablet, Take 1 tablet by mouth every night at bedtime. EVERY NIGHT, Disp: , Rfl: 1       Objective:      Vitals:    04/11/24 1245   BP: (!) 178/108   Pulse: 96   SpO2: 98%     Vitals and nursing note reviewed.   Constitutional:       General: Not in acute distress.     Appearance: Not in distress.   Neck:      Vascular: No JVD or JVR. JVD normal.   Pulmonary:      Effort: Pulmonary effort is normal.      Breath sounds: Normal breath sounds.   Cardiovascular:      Normal rate. Regular rhythm.      Murmurs: There is no murmur.      No gallop.  No rub.   Pulses:     Intact distal pulses.   Edema:     Peripheral edema absent.   Skin:     General: Skin is warm and dry.   Neurological:      Mental Status: Alert, oriented to person, place, and time and oriented to person, place and time.     Physical Exam      Lab Review:         ECG 12 Lead    Date/Time: 4/11/2024 12:55 PM  Performed by: Joseph Sawyer MD    Authorized by: Joseph Sawyer MD  Comparison: compared with previous ECG from 3/13/2024  Similar to previous ECG  Rhythm: sinus rhythm  Ectopy: atrial premature contractions  Rate: normal  BPM: 96  Conduction: left bundle branch block  QRS axis: left    Clinical impression: abnormal EKG    Results    Results for orders placed during the hospital encounter of 03/13/24    Adult Transthoracic Echo Complete w/ Color, Spectral and Contrast if necessary per protocol    Interpretation Summary  •  Left ventricular systolic function is moderately decreased.  Left ventricular ejection fraction appears to be 36 - 40%.  •  The following left ventricular wall segments are hypokinetic: apical inferior, mid inferior, apical septal, basal  inferoseptal, mid inferoseptal, mid anteroseptal, basal inferior and basal inferoseptal.  •  Left ventricular diastolic function is consistent with (grade III w/high LAP) reversible restrictive pattern.  •  The left atrial cavity is severely dilated.  •  The right atrial cavity is mildly  dilated.  •  Estimated right ventricular systolic pressure from tricuspid regurgitation is normal (<35 mmHg).  •  Normal size and mildly reduced function of the right ventricle.  •  No significant (greater than mild) valvular pathology.  •  No prior TTEs available for comparison.      Assessment/Plan:     Problem List Items Addressed This Visit          Cardiac and Vasculature    Essential hypertension (Chronic)    Chronic systolic heart failure - Primary (Chronic)    Overview     EF 27% 2017 nuclear stress  EF abnormal stress 2021, reportedly normal cath  EF 40%, severe SHANON, moderate LAE, moderate to severe RVE, mild MR, severe TR (RVSP 45 mm Hg), mild PI, physiologic pericardial effusion [place] [facility] [place]  [facility]  EF 46-52% 10[place]2023 [place] [facility] [place]             Palpitations         Recommendations/plans:    Assessment & Plan  1. Chronic systolic heart failure: Stable/euvolemic, but still with NYHA II symptoms  -Despite the absence of fluid retention, the patient's blood pressure remains elevated, averaging around 150/97. -A comprehensive discussion was held regarding the necessity of discontinuing clonidine.   -Check labs today, including BNP and BMP.  After seeing these, will call her with instructions, but but will anticipate increasing her Entresto to the moderate dose and increasing Farxiga from 5 up to 10 mg daily.  -f/u bp  -Check coronary CT angiogram to ensure no obstructive CAD, especially since we have been unable to obtain images [place] [facility] [place]    2.  Essential hypertension: Remains uncontrolled, with wide fluctuations.  Continue current  regimen for now, with anticipated medication changes as outlined above.  Ultimately, once better controlled, will advise weaning off of clonidine to help avoid send fluctuations.  -Current regimen includes Coreg 6.25 twice daily, low dose Entresto (24/26 bid), Farxiga 5, and spironolactone 25 daily    Joseph Sawyer MD  04/11/2024  13:01 CDT    Transcribed from ambient dictation for Joseph Sawyer MD by Joseph Sawyer MD.  04/11/24   12:55 CDT    Patient or patient representative verbalized consent to the visit recording.

## 2024-04-12 RX ORDER — SACUBITRIL AND VALSARTAN 49; 51 MG/1; MG/1
1 TABLET, FILM COATED ORAL 2 TIMES DAILY
Qty: 60 TABLET | Refills: 11 | Status: SHIPPED | OUTPATIENT
Start: 2024-04-12

## 2024-04-12 RX ORDER — DAPAGLIFLOZIN 10 MG/1
10 TABLET, FILM COATED ORAL DAILY
Qty: 30 TABLET | Refills: 11 | Status: SHIPPED | OUTPATIENT
Start: 2024-04-12

## 2024-04-12 RX ORDER — DAPAGLIFLOZIN 5 MG/1
5 TABLET, FILM COATED ORAL DAILY
Status: SHIPPED | COMMUNITY
Start: 2024-04-12 | End: 2024-04-12

## 2024-04-15 DIAGNOSIS — I50.22 CHRONIC SYSTOLIC HEART FAILURE: Primary | Chronic | ICD-10-CM

## 2024-04-16 ENCOUNTER — TELEPHONE (OUTPATIENT)
Dept: CARDIOLOGY | Facility: CLINIC | Age: 69
End: 2024-04-16
Payer: MEDICARE

## 2024-04-16 NOTE — TELEPHONE ENCOUNTER
Called pt regarding alert received via [employer] loop recorder home monitor. Alert for tachy episode possibly representative of NS-VT. Avg v-rate 171 bpm. Screen snip of the event is attached.    Called for symptom check. Will update this note if she calls back.

## 2024-04-18 ENCOUNTER — TELEPHONE (OUTPATIENT)
Dept: HEMATOLOGY | Age: 69
End: 2024-04-18

## 2024-04-18 NOTE — TELEPHONE ENCOUNTER
Called patient today for their upcoming appointment on 04/23/2024 and patient needed to be rescheduled. Pt stated she would call back to reschedule when she gets back In town. I sent pt info to  ladies to take her off schedule

## 2024-05-03 ENCOUNTER — HOSPITAL ENCOUNTER (OUTPATIENT)
Dept: CT IMAGING | Facility: HOSPITAL | Age: 69
Discharge: HOME | End: 2024-05-03
Payer: MEDICARE

## 2024-05-03 VITALS
SYSTOLIC BLOOD PRESSURE: 140 MMHG | OXYGEN SATURATION: 97 % | RESPIRATION RATE: 16 BRPM | BODY MASS INDEX: 39.88 KG/M2 | TEMPERATURE: 96.8 F | DIASTOLIC BLOOD PRESSURE: 80 MMHG | HEIGHT: 63 IN | HEART RATE: 66 BPM | WEIGHT: 225.09 LBS

## 2024-05-03 DIAGNOSIS — R07.9 CHEST PAIN, UNSPECIFIED TYPE: ICD-10-CM

## 2024-05-03 DIAGNOSIS — I50.22 CHRONIC SYSTOLIC HEART FAILURE: Chronic | ICD-10-CM

## 2024-05-03 DIAGNOSIS — R06.09 DOE (DYSPNEA ON EXERTION): ICD-10-CM

## 2024-05-03 LAB
CREAT SERPL-MCNC: 0.6 MG/DL (ref 0.57–1)
EGFRCR SERPLBLD CKD-EPI 2021: 98.5 ML/MIN/1.73

## 2024-05-03 PROCEDURE — 75574 CT ANGIO HRT W/3D IMAGE: CPT

## 2024-05-03 PROCEDURE — 63710000001 NITROGLYCERIN 0.4 MG SUBLINGUAL TABLET: Performed by: INTERNAL MEDICINE

## 2024-05-03 PROCEDURE — A9270 NON-COVERED ITEM OR SERVICE: HCPCS | Performed by: INTERNAL MEDICINE

## 2024-05-03 PROCEDURE — 25510000001 IOPAMIDOL PER 1 ML: Performed by: INTERNAL MEDICINE

## 2024-05-03 PROCEDURE — 82565 ASSAY OF CREATININE: CPT | Performed by: INTERNAL MEDICINE

## 2024-05-03 PROCEDURE — 63710000001 METOPROLOL TARTRATE 100 MG TABLET: Performed by: INTERNAL MEDICINE

## 2024-05-03 RX ORDER — METOPROLOL TARTRATE 100 MG/1
100 TABLET ORAL ONCE AS NEEDED
Status: COMPLETED | OUTPATIENT
Start: 2024-05-03 | End: 2024-05-03

## 2024-05-03 RX ORDER — SODIUM CHLORIDE 0.9 % (FLUSH) 0.9 %
10 SYRINGE (ML) INJECTION EVERY 12 HOURS SCHEDULED
Status: DISCONTINUED | OUTPATIENT
Start: 2024-05-03 | End: 2024-05-04 | Stop reason: HOSPADM

## 2024-05-03 RX ORDER — SODIUM CHLORIDE 9 MG/ML
40 INJECTION, SOLUTION INTRAVENOUS AS NEEDED
Status: DISCONTINUED | OUTPATIENT
Start: 2024-05-03 | End: 2024-05-04 | Stop reason: HOSPADM

## 2024-05-03 RX ORDER — SODIUM CHLORIDE 0.9 % (FLUSH) 0.9 %
10 SYRINGE (ML) INJECTION AS NEEDED
Status: DISCONTINUED | OUTPATIENT
Start: 2024-05-03 | End: 2024-05-04 | Stop reason: HOSPADM

## 2024-05-03 RX ORDER — NITROGLYCERIN 0.4 MG/1
0.4 TABLET SUBLINGUAL
Status: DISCONTINUED | OUTPATIENT
Start: 2024-05-03 | End: 2024-05-04 | Stop reason: HOSPADM

## 2024-05-03 RX ADMIN — METOPROLOL TARTRATE 100 MG: 100 TABLET, FILM COATED ORAL at 10:39

## 2024-05-03 RX ADMIN — IOPAMIDOL 100 ML: 755 INJECTION, SOLUTION INTRAVENOUS at 13:33

## 2024-05-03 RX ADMIN — NITROGLYCERIN 0.4 MG: 0.4 TABLET SUBLINGUAL at 13:17

## 2024-05-03 RX ADMIN — NITROGLYCERIN 0.4 MG: 0.4 TABLET SUBLINGUAL at 13:12

## 2024-05-06 ENCOUNTER — HOSPITAL ENCOUNTER (OUTPATIENT)
Dept: CT IMAGING | Facility: HOSPITAL | Age: 69
Discharge: HOME | End: 2024-05-06
Admitting: INTERNAL MEDICINE
Payer: MEDICARE

## 2024-05-06 DIAGNOSIS — R93.1 ABNORMAL FINDINGS DIAGNOSTIC IMAGING OF HEART AND CORONARY CIRCULATION: ICD-10-CM

## 2024-05-06 DIAGNOSIS — R93.1 ABNORMAL FINDINGS DIAGNOSTIC IMAGING OF HEART AND CORONARY CIRCULATION: Primary | ICD-10-CM

## 2024-05-06 PROCEDURE — 75580 N-INVAS EST C FFR SW ALY CTA: CPT

## 2024-05-23 ENCOUNTER — PREP FOR SURGERY (OUTPATIENT)
Dept: OTHER | Facility: HOSPITAL | Age: 69
End: 2024-05-23
Payer: MEDICARE

## 2024-05-23 DIAGNOSIS — I25.10 LAD STENOSIS: Primary | ICD-10-CM

## 2024-05-23 DIAGNOSIS — I50.22 CHRONIC SYSTOLIC HEART FAILURE: Chronic | ICD-10-CM

## 2024-05-23 RX ORDER — ASPIRIN 81 MG/1
324 TABLET, CHEWABLE ORAL ONCE
OUTPATIENT
Start: 2024-05-23 | End: 2024-05-23

## 2024-05-23 RX ORDER — ASPIRIN 81 MG/1
81 TABLET ORAL DAILY
OUTPATIENT
Start: 2024-05-24

## 2024-05-23 RX ORDER — SODIUM CHLORIDE 9 MG/ML
40 INJECTION, SOLUTION INTRAVENOUS AS NEEDED
OUTPATIENT
Start: 2024-05-23

## 2024-05-23 RX ORDER — SODIUM CHLORIDE 0.9 % (FLUSH) 0.9 %
3 SYRINGE (ML) INJECTION EVERY 12 HOURS SCHEDULED
OUTPATIENT
Start: 2024-05-23

## 2024-05-23 RX ORDER — SODIUM CHLORIDE 0.9 % (FLUSH) 0.9 %
10 SYRINGE (ML) INJECTION AS NEEDED
OUTPATIENT
Start: 2024-05-23

## 2024-05-24 PROBLEM — I25.10 LAD STENOSIS: Status: ACTIVE | Noted: 2024-05-23

## 2024-06-03 DIAGNOSIS — I10 ESSENTIAL HYPERTENSION: Chronic | ICD-10-CM

## 2024-06-04 RX ORDER — CLONIDINE HYDROCHLORIDE 0.1 MG/1
0.1 TABLET ORAL 3 TIMES DAILY
Qty: 90 TABLET | Refills: 3 | OUTPATIENT
Start: 2024-06-04

## 2024-06-06 ENCOUNTER — HOSPITAL ENCOUNTER (OUTPATIENT)
Facility: HOSPITAL | Age: 69
Setting detail: HOSPITAL OUTPATIENT SURGERY
Discharge: HOME | End: 2024-06-06
Attending: INTERNAL MEDICINE | Admitting: INTERNAL MEDICINE
Payer: MEDICARE

## 2024-06-06 VITALS
BODY MASS INDEX: 40.64 KG/M2 | OXYGEN SATURATION: 97 % | SYSTOLIC BLOOD PRESSURE: 164 MMHG | HEIGHT: 63 IN | WEIGHT: 229.4 LBS | TEMPERATURE: 98.1 F | HEART RATE: 73 BPM | DIASTOLIC BLOOD PRESSURE: 98 MMHG | RESPIRATION RATE: 14 BRPM

## 2024-06-06 DIAGNOSIS — I25.10 LAD STENOSIS: ICD-10-CM

## 2024-06-06 DIAGNOSIS — I50.22 CHRONIC SYSTOLIC HEART FAILURE: ICD-10-CM

## 2024-06-06 LAB
ANION GAP SERPL CALCULATED.3IONS-SCNC: 10 MMOL/L (ref 5–15)
BUN SERPL-MCNC: 21 MG/DL (ref 8–23)
BUN/CREAT SERPL: 31.3 (ref 7–25)
CALCIUM SPEC-SCNC: 9.2 MG/DL (ref 8.6–10.5)
CHLORIDE SERPL-SCNC: 102 MMOL/L (ref 98–107)
CO2 SERPL-SCNC: 23 MMOL/L (ref 22–29)
CREAT SERPL-MCNC: 0.67 MG/DL (ref 0.57–1)
DEPRECATED RDW RBC AUTO: 45.1 FL (ref 37–54)
EGFRCR SERPLBLD CKD-EPI 2021: 95.9 ML/MIN/1.73
ERYTHROCYTE [DISTWIDTH] IN BLOOD BY AUTOMATED COUNT: 17.4 % (ref 12.3–15.4)
GLUCOSE SERPL-MCNC: 126 MG/DL (ref 65–99)
HCT VFR BLD AUTO: 38.9 % (ref 34–46.6)
HGB BLD-MCNC: 11.5 G/DL (ref 12–15.9)
MCH RBC QN AUTO: 21.7 PG (ref 26.6–33)
MCHC RBC AUTO-ENTMCNC: 29.6 G/DL (ref 31.5–35.7)
MCV RBC AUTO: 73.4 FL (ref 79–97)
NT-PROBNP SERPL-MCNC: 2939 PG/ML (ref 0–900)
PLATELET # BLD AUTO: 398 10*3/MM3 (ref 140–450)
PMV BLD AUTO: 10.7 FL (ref 6–12)
POTASSIUM SERPL-SCNC: 4.4 MMOL/L (ref 3.5–5.2)
RBC # BLD AUTO: 5.3 10*6/MM3 (ref 3.77–5.28)
SODIUM SERPL-SCNC: 135 MMOL/L (ref 136–145)
WBC NRBC COR # BLD AUTO: 8.96 10*3/MM3 (ref 3.4–10.8)

## 2024-06-06 PROCEDURE — 99152 MOD SED SAME PHYS/QHP 5/>YRS: CPT | Performed by: INTERNAL MEDICINE

## 2024-06-06 PROCEDURE — 85027 COMPLETE CBC AUTOMATED: CPT | Performed by: INTERNAL MEDICINE

## 2024-06-06 PROCEDURE — 25010000002 MIDAZOLAM HCL (PF) 5 MG/5ML SOLUTION: Performed by: INTERNAL MEDICINE

## 2024-06-06 PROCEDURE — 25810000003 SODIUM CHLORIDE 0.9 % SOLUTION: Performed by: INTERNAL MEDICINE

## 2024-06-06 PROCEDURE — 99153 MOD SED SAME PHYS/QHP EA: CPT | Performed by: INTERNAL MEDICINE

## 2024-06-06 PROCEDURE — 25010000002 DIPHENHYDRAMINE PER 50 MG: Performed by: INTERNAL MEDICINE

## 2024-06-06 PROCEDURE — 25010000002 HEPARIN (PORCINE) PER 1000 UNITS: Performed by: INTERNAL MEDICINE

## 2024-06-06 PROCEDURE — 25010000002 HEPARIN (PORCINE) 1000-0.9 UT/500ML-% SOLUTION: Performed by: INTERNAL MEDICINE

## 2024-06-06 PROCEDURE — C1769 GUIDE WIRE: HCPCS | Performed by: INTERNAL MEDICINE

## 2024-06-06 PROCEDURE — 25510000001 IOPAMIDOL PER 1 ML: Performed by: INTERNAL MEDICINE

## 2024-06-06 PROCEDURE — 93458 L HRT ARTERY/VENTRICLE ANGIO: CPT | Performed by: INTERNAL MEDICINE

## 2024-06-06 PROCEDURE — 83880 ASSAY OF NATRIURETIC PEPTIDE: CPT | Performed by: INTERNAL MEDICINE

## 2024-06-06 PROCEDURE — 80048 BASIC METABOLIC PNL TOTAL CA: CPT | Performed by: INTERNAL MEDICINE

## 2024-06-06 PROCEDURE — 25010000002 FENTANYL CITRATE (PF) 50 MCG/ML SOLUTION: Performed by: INTERNAL MEDICINE

## 2024-06-06 PROCEDURE — 25010000002 HEPARIN (PORCINE) 2000-0.9 UNIT/L-% SOLUTION: Performed by: INTERNAL MEDICINE

## 2024-06-06 PROCEDURE — C1894 INTRO/SHEATH, NON-LASER: HCPCS | Performed by: INTERNAL MEDICINE

## 2024-06-06 RX ORDER — FENTANYL CITRATE 50 UG/ML
INJECTION, SOLUTION INTRAMUSCULAR; INTRAVENOUS
Status: DISCONTINUED | OUTPATIENT
Start: 2024-06-06 | End: 2024-06-06 | Stop reason: HOSPADM

## 2024-06-06 RX ORDER — SODIUM CHLORIDE 0.9 % (FLUSH) 0.9 %
10 SYRINGE (ML) INJECTION AS NEEDED
Status: DISCONTINUED | OUTPATIENT
Start: 2024-06-06 | End: 2024-06-06 | Stop reason: HOSPADM

## 2024-06-06 RX ORDER — ASPIRIN 81 MG/1
81 TABLET ORAL DAILY
Status: DISCONTINUED | OUTPATIENT
Start: 2024-06-07 | End: 2024-06-06 | Stop reason: HOSPADM

## 2024-06-06 RX ORDER — HEPARIN SODIUM 200 [USP'U]/100ML
INJECTION, SOLUTION INTRAVENOUS
Status: DISCONTINUED | OUTPATIENT
Start: 2024-06-06 | End: 2024-06-06 | Stop reason: HOSPADM

## 2024-06-06 RX ORDER — ASPIRIN 81 MG/1
324 TABLET, CHEWABLE ORAL ONCE
Status: COMPLETED | OUTPATIENT
Start: 2024-06-06 | End: 2024-06-06

## 2024-06-06 RX ORDER — SODIUM CHLORIDE 0.9 % (FLUSH) 0.9 %
3 SYRINGE (ML) INJECTION EVERY 12 HOURS SCHEDULED
Status: DISCONTINUED | OUTPATIENT
Start: 2024-06-06 | End: 2024-06-06 | Stop reason: HOSPADM

## 2024-06-06 RX ORDER — MIDAZOLAM HYDROCHLORIDE 5 MG/5ML
INJECTION, SOLUTION INTRAMUSCULAR; INTRAVENOUS
Status: DISCONTINUED | OUTPATIENT
Start: 2024-06-06 | End: 2024-06-06 | Stop reason: HOSPADM

## 2024-06-06 RX ORDER — ACETAMINOPHEN 325 MG/1
650 TABLET ORAL EVERY 4 HOURS PRN
Status: CANCELLED | OUTPATIENT
Start: 2024-06-06

## 2024-06-06 RX ORDER — VERAPAMIL HYDROCHLORIDE 2.5 MG/ML
INJECTION, SOLUTION INTRAVENOUS
Status: DISCONTINUED | OUTPATIENT
Start: 2024-06-06 | End: 2024-06-06 | Stop reason: HOSPADM

## 2024-06-06 RX ORDER — ASPIRIN 81 MG/1
TABLET, CHEWABLE ORAL
Status: COMPLETED
Start: 2024-06-06 | End: 2024-06-06

## 2024-06-06 RX ORDER — SACUBITRIL AND VALSARTAN 97; 103 MG/1; MG/1
1 TABLET, FILM COATED ORAL 2 TIMES DAILY
Qty: 60 TABLET | Refills: 11 | Status: SHIPPED | OUTPATIENT
Start: 2024-06-06

## 2024-06-06 RX ORDER — NITROGLYCERIN 0.4 MG/1
0.4 TABLET SUBLINGUAL
Status: CANCELLED | OUTPATIENT
Start: 2024-06-06

## 2024-06-06 RX ORDER — LIDOCAINE HYDROCHLORIDE 20 MG/ML
INJECTION, SOLUTION INFILTRATION; PERINEURAL
Status: DISCONTINUED | OUTPATIENT
Start: 2024-06-06 | End: 2024-06-06 | Stop reason: HOSPADM

## 2024-06-06 RX ORDER — DIPHENHYDRAMINE HYDROCHLORIDE 50 MG/ML
INJECTION INTRAMUSCULAR; INTRAVENOUS
Status: DISCONTINUED | OUTPATIENT
Start: 2024-06-06 | End: 2024-06-06 | Stop reason: HOSPADM

## 2024-06-06 RX ORDER — HEPARIN SODIUM 1000 [USP'U]/ML
INJECTION, SOLUTION INTRAVENOUS; SUBCUTANEOUS
Status: DISCONTINUED | OUTPATIENT
Start: 2024-06-06 | End: 2024-06-06 | Stop reason: HOSPADM

## 2024-06-06 RX ORDER — SODIUM CHLORIDE 9 MG/ML
40 INJECTION, SOLUTION INTRAVENOUS AS NEEDED
Status: DISCONTINUED | OUTPATIENT
Start: 2024-06-06 | End: 2024-06-06 | Stop reason: HOSPADM

## 2024-06-06 RX ADMIN — ASPIRIN 324 MG: 81 TABLET, CHEWABLE ORAL at 09:01

## 2024-06-06 RX ADMIN — SODIUM CHLORIDE 306 ML: 9 INJECTION, SOLUTION INTRAVENOUS at 09:02

## 2024-06-06 RX ADMIN — ASPIRIN 81 MG CHEWABLE TABLET 324 MG: 81 TABLET CHEWABLE at 09:01

## 2024-06-06 NOTE — H&P
[facility] - CARDIOLOGY  HISTORY AND PHYSICAL    Date of Admission: 6/6/2024  Primary Care Physician: Virginia Rey APRN    Subjective     Chief Complaint: ***    History of Present Illness        Review of Systems   Otherwise complete ROS reviewed and negative except as mentioned in the HPI.    Past Medical History:   Past Medical History:   Diagnosis Date    Afib 10/2023    Bradycardia     Cardiomyopathy     CHF (congestive heart failure)     Chronic kidney disease     Coronary arteriosclerosis     Diabetes mellitus     Diverticulitis     GERD (gastroesophageal reflux disease)     HLD (hyperlipidemia)     HTN (hypertension)     Hx of pulmonary embolus     Hypercholesterolemia     LBBB (left bundle branch block)     Obesity     Pulmonary embolism     Rectal bleeding     RLS (restless legs syndrome)     Vitamin D deficiency        Past Surgical History:  Past Surgical History:   Procedure Laterality Date    APPENDECTOMY  1970    [place] [facility] [place] mild, diffuse, non-obstructive CAD    CHOLECYSTECTOMY  1991    HYSTERECTOMY  1989    LEG SURGERY Left 2015    CALCIFIED TUMOR    TUBAL ABDOMINAL LIGATION  1982    TUMOR REMOVAL         Family History: family history includes Arrhythmia in her maternal aunt; Asthma in her father; Diabetes type II in her brother, father, mother, and paternal grandmother; Heart attack in her brother and paternal grandfather; Heart disease in her brother, father, maternal grandfather, maternal grandmother, mother, paternal grandfather, and paternal grandmother; Heart failure in her brother, father, maternal grandmother, and mother; Hyperlipidemia in her father; Hypertension in her brother, father, mother, and sister; Stroke in her paternal grandmother and sister; Ulcerative colitis in her sister.    Social History:  reports that she quit smoking about 28 years ago. Her smoking use included cigarettes. She started smoking about 49 years ago. She has a  5.3 pack-year smoking history. She has never used smokeless tobacco. She reports that she does not currently use alcohol. She reports that she does not use drugs.    Medications:  Prior to Admission medications    Medication Sig Start Date End Date Taking? Authorizing Provider   acetaminophen (TYLENOL) 500 MG tablet Take 2 tablets by mouth Every 6 (Six) Hours As Needed for Mild Pain. Takes about twice a week   Yes Heidi Garcia MD   atorvastatin (LIPITOR) 20 MG tablet Take 1 tablet by mouth Daily.   Yes eHidi Garcia MD   carvedilol (COREG) 6.25 MG tablet Take 2 tablets by mouth 2 (Two) Times a Day. 4/11/24  Yes Joseph Sawyer MD   cloNIDine (CATAPRES) 0.1 MG tablet Take 1 tablet by mouth 3 times a day.  Patient taking differently: Take 2 tablets by mouth 3 times a day. 12/13/23  Yes Joseph Sawyer MD   dapagliflozin Propanediol (Farxiga) 10 MG tablet Take 10 mg by mouth Daily. 4/12/24  Yes Joseph Sawyer MD   diclofenac (VOLTAREN) 75 MG EC tablet Take 1 tablet by mouth Daily As Needed (joint pain). Has been taking at least once, usually twice a week   Yes Heidi Garcia MD   Dulaglutide (Trulicity) 0.75 MG/0.5ML solution pen-injector Inject 1.5 mg under the skin into the appropriate area as directed 1 (One) Time Per Week. on Thurs   Yes Heidi Garcia MD   furosemide (LASIX) 20 MG tablet TAKE 1 TABLET BY MOUTH DAILY AS NEEDED (SWELLING). 3/11/24 3/11/25 Yes Nancy Sethi APRN   glipizide (GLUCOTROL) 10 MG tablet Take 1 tablet by mouth 2 (Two) Times a Day Before Meals.   Yes Heidi Garcia MD   insulin glargine (LANTUS, SEMGLEE) 100 UNIT/ML injection Inject 10 Units under the skin into the appropriate area as directed Every Night.   Yes Heidi Garcia MD   Insulin Lispro (humaLOG) 100 UNIT/ML injection Inject 10 Units under the skin into the appropriate area as directed 3 (Three) Times a Day As Needed for High Blood Sugar. *was given after hospital discharge  in 10/2023- usually doesn't take-only takes if BG over 250   Yes Heidi Garcia MD   montelukast (SINGULAIR) 10 MG tablet Take 1 tablet by mouth Every Night. 10/1/19  Yes Heidi Garcia MD   pantoprazole (PROTONIX) 40 MG EC tablet Take 1 tablet by mouth Daily.   Yes Heidi Garcia MD   promethazine (PHENERGAN) 25 MG tablet Take 1 tablet by mouth Every 6 (Six) Hours As Needed for Nausea or Vomiting.   Yes Heidi Garcia MD   sacubitril-valsartan (Entresto) 49-51 MG tablet Take 1 tablet by mouth 2 (Two) Times a Day. 4/12/24  Yes Joseph Sawyer MD   spironolactone (ALDACTONE) 25 MG tablet Take 1 tablet by mouth. 9/13/23  Yes Heidi Garcia MD   sucralfate (CARAFATE) 1 g tablet Take 1 tablet by mouth 3 (Three) Times a Day Before Meals. 10/22/23  Yes Heidi Garcia MD   TiZANidine (ZANAFLEX) 4 MG capsule Take 1 capsule by mouth 3 (Three) Times a Day As Needed for Muscle Spasms. Usually takes twice a day   Yes Heidi Garcia MD   traMADol (ULTRAM) 50 MG tablet Take 1 tablet by mouth 2 (Two) Times a Day As Needed for Moderate Pain.   Yes Heidi Garcia MD   vitamin D (ERGOCALCIFEROL) 1.25 MG (88972 UT) capsule capsule Take 1 capsule by mouth 2 (Two) Times a Week.   Yes Heidi Garcia MD   zolpidem (AMBIEN) 10 MG tablet Take 1 tablet by mouth every night at bedtime. EVERY NIGHT 9/5/19  Yes Heidi Garcia MD   nystatin (MYCOSTATIN) 735364 UNIT/GM powder Apply 1 Application topically to the appropriate area as directed 4 (Four) Times a Day As Needed.    Heidi Garcia MD     Allergies:  Allergies   Allergen Reactions    Cephalexin Shortness Of Breath    Cheese Unknown - High Severity     BLUE CHEESE: tongue swells    Codeine Shortness Of Breath    Levaquin [Levofloxacin] Anaphylaxis    Morphine Shortness Of Breath    Naproxen Shortness Of Breath    Penicillins Shortness Of Breath    Aspirin Other (See Comments)     Stopped 325 mg due to hemoptysis  "   Cetirizine Dizziness    Ciprofloxacin Other (See Comments)     Was taking zanaflex at the time and there is a drug interaction    Gabapentin Swelling     BLEs - not face, lips, or tongues    Keppra [Levetiracetam] Other (See Comments)     VISION LOSS    Lisinopril Other (See Comments)     Dry cough    Meloxicam Nausea Only    Vancomycin Other (See Comments)     Vision loss    Sulfa Antibiotics Rash       Objective     Vital Signs: BP (!) 173/102   Pulse 73   Temp 98.1 °F (36.7 °C)   Resp 16   Ht 160 cm (63\")   Wt 104 kg (229 lb 6.4 oz)   SpO2 100%   BMI 40.64 kg/m²     Physical Exam    Results Reviewed:  I have reviewed all laboratory data, with pertinent findings: ***  I have reviewed the chest x-ray report/personally visualized chest x-ray, with findings ***  I have reviewed telemetry, which shows ***  I have visualized all the electrocardiograms performed, with my interpretations to follow: ***      Assessment / Plan        Active Hospital Problems    Diagnosis     **LAD stenosis     Chronic systolic heart failure      ***      Code Status: ***     I discussed the patients findings and my recommendations with: ***    Estimated length of stay: ***    Joseph Sawyer MD   06/06/24   11:37 CDT    " bleeding, infection, vascular damage [including minor oozing, bruising, bleeding, and up to and including the need for vascular surgery], contrast reaction, renal failure, respiratory failure, heart attack, stroke, arrhythmia and even death), benefits, and alternatives and the patient has voiced understanding and is willing to proceed.        Code Status: full     I discussed the patients findings and my recommendations with: patient and family    Estimated length of stay: ?    Joseph Sawyer MD   06/06/24   11:37 CDT

## 2024-06-14 ENCOUNTER — OFFICE VISIT (OUTPATIENT)
Dept: CARDIOLOGY | Facility: CLINIC | Age: 69
End: 2024-06-14
Payer: MEDICARE

## 2024-06-14 ENCOUNTER — LAB (OUTPATIENT)
Dept: LAB | Facility: HOSPITAL | Age: 69
End: 2024-06-14
Payer: MEDICARE

## 2024-06-14 VITALS
WEIGHT: 224.4 LBS | SYSTOLIC BLOOD PRESSURE: 134 MMHG | HEART RATE: 79 BPM | HEIGHT: 63 IN | BODY MASS INDEX: 39.76 KG/M2 | OXYGEN SATURATION: 99 % | DIASTOLIC BLOOD PRESSURE: 92 MMHG

## 2024-06-14 DIAGNOSIS — I10 ESSENTIAL HYPERTENSION: Chronic | ICD-10-CM

## 2024-06-14 DIAGNOSIS — I50.22 CHRONIC SYSTOLIC HEART FAILURE: ICD-10-CM

## 2024-06-14 DIAGNOSIS — I42.8 NON-ISCHEMIC CARDIOMYOPATHY: ICD-10-CM

## 2024-06-14 DIAGNOSIS — I50.22 CHRONIC SYSTOLIC HEART FAILURE: Primary | Chronic | ICD-10-CM

## 2024-06-14 DIAGNOSIS — I44.7 LBBB (LEFT BUNDLE BRANCH BLOCK): ICD-10-CM

## 2024-06-14 LAB
ANION GAP SERPL CALCULATED.3IONS-SCNC: 9 MMOL/L (ref 5–15)
BUN SERPL-MCNC: 21 MG/DL (ref 8–23)
BUN/CREAT SERPL: 24.7 (ref 7–25)
CALCIUM SPEC-SCNC: 9 MG/DL (ref 8.6–10.5)
CHLORIDE SERPL-SCNC: 102 MMOL/L (ref 98–107)
CO2 SERPL-SCNC: 27 MMOL/L (ref 22–29)
CREAT SERPL-MCNC: 0.85 MG/DL (ref 0.57–1)
EGFRCR SERPLBLD CKD-EPI 2021: 75.2 ML/MIN/1.73
GLUCOSE SERPL-MCNC: 122 MG/DL (ref 65–99)
NT-PROBNP SERPL-MCNC: 3498 PG/ML (ref 0–900)
POTASSIUM SERPL-SCNC: 4.5 MMOL/L (ref 3.5–5.2)
SODIUM SERPL-SCNC: 138 MMOL/L (ref 136–145)

## 2024-06-14 PROCEDURE — 80048 BASIC METABOLIC PNL TOTAL CA: CPT

## 2024-06-14 PROCEDURE — 83880 ASSAY OF NATRIURETIC PEPTIDE: CPT

## 2024-06-14 PROCEDURE — 36415 COLL VENOUS BLD VENIPUNCTURE: CPT

## 2024-06-14 RX ORDER — SPIRONOLACTONE 50 MG/1
50 TABLET, FILM COATED ORAL DAILY
Qty: 30 TABLET | Refills: 11 | Status: SHIPPED | OUTPATIENT
Start: 2024-06-14

## 2024-06-14 NOTE — PROGRESS NOTES
Subjective:     Encounter Date:06/14/2024      Patient ID: Damaris Garcias is a 69 y.o. female.    Chief Complaint: follow up chronic systolic CHF, nonischemic cardiomyopathy    History of Present Illness    The patient presents to follow-up regarding her chronic systolic congestive heart failure/nonischemic cardiomyopathy.  Due to significant exertional dyspnea and CT of the coronary arteries suggesting LAD disease she recently underwent cardiac catheterization on 6/6.  This revealed nonobstructive disease, LVEF of 33.7%.  It was noted cardiac MRI would be considered for accurate EF assessment in the future, as well is further assessment of other possible etiologies of her heart failure.  At the time of the cardiac catheterization her Entresto was increased to max dose.  Despite previous recommendations to wean clonidine off she does continue to take clonidine 0.2 mg 3 times daily at the direction of her PCP.  Her blood pressures are very labile with often normal readings in the morning and more acutely elevated readings in the evening hours.    Previous notes indicated she might of had atrial fibrillation in the past but after thorough review of records there was no evidence to confirm this and Eliquis was discontinued by Dr. Sawyer.  Additionally she has had significant blood loss anemia in the past when taking Eliquis for DVT and PE in 2021.    Today she reports she is feeling about the same compared to the time of her cardiac catheterization, but she does report her blood pressures seem to have improved in the morning now that she is on the higher dose of Entresto.  She states her morning blood pressures are typically in the 130s systolic but often in the evening when she is exerting and walking around the house she gets short of breath and notices systolic blood pressures in the 170s.  She has been taking Lasix 20 mg most days for the past 1 week.  She states her weight is stable.  She denies chest pain,  palpitations, orthopnea, PND.  She did have labs today that showed normal renal function after increasing the Entresto.  BNP remains elevated but stable.         The following portions of the patient's history were reviewed and updated as appropriate: allergies, current medications, past family history, past medical history, past social history, past surgical history and problem list.    Review of Systems   Constitutional: Negative for malaise/fatigue.   Cardiovascular:  Positive for dyspnea on exertion. Negative for chest pain, claudication, leg swelling, near-syncope, orthopnea, palpitations, paroxysmal nocturnal dyspnea and syncope.   Respiratory:  Positive for shortness of breath. Negative for cough.    Hematologic/Lymphatic: Does not bruise/bleed easily.   Musculoskeletal:  Negative for falls.   Gastrointestinal:  Negative for bloating.   Neurological:  Negative for dizziness, light-headedness and weakness.       Allergies   Allergen Reactions    Cephalexin Shortness Of Breath    Cheese Unknown - High Severity     BLUE CHEESE: tongue swells    Codeine Shortness Of Breath    Levaquin [Levofloxacin] Anaphylaxis    Morphine Shortness Of Breath    Naproxen Shortness Of Breath    Penicillins Shortness Of Breath    Aspirin Other (See Comments)     Stopped 325 mg due to hemoptysis    Cetirizine Dizziness    Ciprofloxacin Other (See Comments)     Was taking zanaflex at the time and there is a drug interaction    Gabapentin Swelling     BLEs - not face, lips, or tongues    Keppra [Levetiracetam] Other (See Comments)     VISION LOSS    Lisinopril Other (See Comments)     Dry cough    Meloxicam Nausea Only    Vancomycin Other (See Comments)     Vision loss    Sulfa Antibiotics Rash       Current Outpatient Medications:     acetaminophen (TYLENOL) 500 MG tablet, Take 2 tablets by mouth Every 6 (Six) Hours As Needed for Mild Pain. Takes about twice a week, Disp: , Rfl:     atorvastatin (LIPITOR) 20 MG tablet, Take 1 tablet  by mouth Daily., Disp: , Rfl:     carvedilol (COREG) 6.25 MG tablet, Take 2 tablets by mouth 2 (Two) Times a Day., Disp: 180 tablet, Rfl: 3    cloNIDine (CATAPRES) 0.1 MG tablet, Take 1 tablet by mouth 3 times a day. (Patient taking differently: Take 2 tablets by mouth 3 times a day.), Disp: 90 tablet, Rfl: 3    dapagliflozin Propanediol (Farxiga) 10 MG tablet, Take 10 mg by mouth Daily., Disp: 30 tablet, Rfl: 11    diclofenac (VOLTAREN) 75 MG EC tablet, Take 1 tablet by mouth Daily As Needed (joint pain). Has been taking at least once, usually twice a week, Disp: , Rfl:     Dulaglutide (Trulicity) 0.75 MG/0.5ML solution pen-injector, Inject 1.5 mg under the skin into the appropriate area as directed 1 (One) Time Per Week. on Thurs, Disp: , Rfl:     furosemide (LASIX) 20 MG tablet, TAKE 1 TABLET BY MOUTH DAILY AS NEEDED (SWELLING)., Disp: 90 tablet, Rfl: 1    glipizide (GLUCOTROL) 10 MG tablet, Take 1 tablet by mouth 2 (Two) Times a Day Before Meals., Disp: , Rfl:     insulin glargine (LANTUS, SEMGLEE) 100 UNIT/ML injection, Inject 10 Units under the skin into the appropriate area as directed Every Night., Disp: , Rfl:     Insulin Lispro (humaLOG) 100 UNIT/ML injection, Inject 10 Units under the skin into the appropriate area as directed 3 (Three) Times a Day As Needed for High Blood Sugar. *was given after hospital discharge in 10/2023- usually doesn't take-only takes if BG over 250, Disp: , Rfl:     montelukast (SINGULAIR) 10 MG tablet, Take 1 tablet by mouth Every Night., Disp: , Rfl:     nystatin (MYCOSTATIN) 573671 UNIT/GM powder, Apply 1 Application topically to the appropriate area as directed 4 (Four) Times a Day As Needed., Disp: , Rfl:     pantoprazole (PROTONIX) 40 MG EC tablet, Take 1 tablet by mouth Daily., Disp: , Rfl:     promethazine (PHENERGAN) 25 MG tablet, Take 1 tablet by mouth Every 6 (Six) Hours As Needed for Nausea or Vomiting., Disp: , Rfl:     sacubitril-valsartan (Entresto)  MG  "tablet, Take 1 tablet by mouth 2 (Two) Times a Day., Disp: 60 tablet, Rfl: 11    spironolactone (ALDACTONE) 50 MG tablet, Take 1 tablet by mouth Daily., Disp: 30 tablet, Rfl: 11    TiZANidine (ZANAFLEX) 4 MG capsule, Take 1 capsule by mouth 3 (Three) Times a Day As Needed for Muscle Spasms. Usually takes twice a day, Disp: , Rfl:     traMADol (ULTRAM) 50 MG tablet, Take 1 tablet by mouth 2 (Two) Times a Day As Needed for Moderate Pain., Disp: , Rfl:     vitamin D (ERGOCALCIFEROL) 1.25 MG (64720 UT) capsule capsule, Take 1 capsule by mouth 2 (Two) Times a Week., Disp: , Rfl:     zolpidem (AMBIEN) 10 MG tablet, Take 1 tablet by mouth every night at bedtime. EVERY NIGHT, Disp: , Rfl: 1         Objective:    /92   Pulse 79   Ht 160 cm (63\")   Wt 102 kg (224 lb 6.4 oz)   SpO2 99%   BMI 39.75 kg/m²        Vitals and nursing note reviewed.   Constitutional:       General: Not in acute distress.     Appearance: Well-developed and not in distress. Not diaphoretic.   Neck:      Vascular: No JVD.   Pulmonary:      Effort: Pulmonary effort is normal. No respiratory distress.      Breath sounds: Normal breath sounds.   Cardiovascular:      Normal rate. Regular rhythm.      Murmurs: There is no murmur.   Edema:     Peripheral edema absent.   Abdominal:      Tenderness: There is no abdominal tenderness.   Skin:     General: Skin is warm and dry.   Neurological:      Mental Status: Alert and oriented to person, place, and time.         Lab Review:   Lab Results   Component Value Date    GLUCOSE 122 (H) 06/14/2024    BUN 21 06/14/2024    CREATININE 0.85 06/14/2024    EGFR 75.2 06/14/2024    BCR 24.7 06/14/2024    K 4.5 06/14/2024    CO2 27.0 06/14/2024    CALCIUM 9.0 06/14/2024    ALBUMIN 4.1 04/11/2024    BILITOT 0.3 04/11/2024    AST 13 04/11/2024    ALT 13 04/11/2024                ECG 12 Lead    Date/Time: 6/14/2024 1:17 PM  Performed by: Maranda Starks APRN    Authorized by: Maranda Starks APRN  Comparison: compared " with previous ECG from 4/11/2024  Similar to previous ECG  Rhythm: sinus rhythm  Ectopy: atrial premature contractions  BPM: 79  Conduction: left bundle branch block    Clinical impression: abnormal EKG          Results for orders placed during the hospital encounter of 03/13/24    Adult Transthoracic Echo Complete w/ Color, Spectral and Contrast if necessary per protocol    Interpretation Summary    Left ventricular systolic function is moderately decreased.  Left ventricular ejection fraction appears to be 36 - 40%.    The following left ventricular wall segments are hypokinetic: apical inferior, mid inferior, apical septal, basal inferoseptal, mid inferoseptal, mid anteroseptal, basal inferior and basal inferoseptal.    Left ventricular diastolic function is consistent with (grade III w/high LAP) reversible restrictive pattern.    The left atrial cavity is severely dilated.    The right atrial cavity is mildly  dilated.    Estimated right ventricular systolic pressure from tricuspid regurgitation is normal (<35 mmHg).    Normal size and mildly reduced function of the right ventricle.    No significant (greater than mild) valvular pathology.    No prior TTEs available for comparison.    6/6/2024 cath report:    Impression:  1. Very mild atherosclerotic coronary disease, with no stenosis of a calcified proximal LAD. Otherwise, there is no calcified segment noted in all 3 coronary vessels are angiographically normal.   2. Dilated, nonischemic cardiomyopathy with LV end-diastolic diameter measured at 7 cm, with severe global hypokinesis and LVEF calculated at 33.7%  3. Elevated LVEDP at 24 mmHg  4. Poorly controlled systemic hypertension       Plan:   1. TR band off in 2 hours then can discharge home  2. Continue aspirin and statin  3. Continue further aggressive medical titration for nonischemic cardiomyopathy; will increase moderate to high intensity Entresto dose today. Continue current dose of Coreg (12.5 mg  twice daily in the form of two 6.25mg tablets in AM and two in PM), spironolactone 25 g daily, and Farxiga 10 mg daily. Has Lasix available for as needed use.  4. Will add BNP to this morning's labs, and have patient follow-up as planned in a few weeks to reassess any changes after increasing Entresto. I have ordered repeat labs (BMP and BNP) to be drawn that day prior to the visit.  5. As we continue aggressive titration of medical therapy, may also consider sending her then for a cardiac MRI to better understand any other potential etiology and more precisely calculate EF (and if <35%, then discuss ICD - likely BiV given LBBB)    Joseph Sawyer MD     Assessment:      Problem List Items Addressed This Visit          Cardiac and Vasculature    Essential hypertension (Chronic)    Relevant Medications    spironolactone (ALDACTONE) 50 MG tablet    Chronic systolic heart failure - Primary (Chronic)    Overview     EF 27% 2017 nuclear stress  EF abnormal stress 2021, reportedly normal cath  EF 40%, severe SHANON, moderate LAE, moderate to severe RVE, mild MR, severe TR (RVSP 45 mm Hg), mild PI, physiologic pericardial effusion 3/2021 echo, Dr. Jasso, [facility]  [facility]  EF 46-52% 10/2023 echo, Dr. Azevedo, [facility]             Relevant Orders    Basic Metabolic Panel    proBNP    MRI Cardiac Function Complete With & Without Morphology    LBBB (left bundle branch block)    Non-ischemic cardiomyopathy    Relevant Orders    MRI Cardiac Function Complete With & Without Morphology       Plan:     1.  Chronic systolic congestive heart failure: Established problem, stable.  Appears to be euvolemic overall but has been taking daily Lasix for the past 1 week.  NYHA class II.    -Continue Entresto 97/103 mg twice daily  -Continue Coreg 12.5 mg twice daily-consider up titration of follow-up  -Continue Farxiga 10 mg daily  -Uptitrate Aldactone to 50 mg daily with follow-up labs 1 week after.  -Lasix 20 mg on  an as-needed basis for weight gain of greater than 2 pounds overnight or greater than 4 pounds in 2 days or shortness of breath/orthopnea/PND  -Heart healthy low-sodium diet  -Daily weights    -Cardiac MRI ordered today for accurate assessment of LVEF and for further assessment of potential etiologies of nonischemic cardiomyopathy    2.  Hypertension: Improved but still uncontrolled overall.  See notes above.  Increasing Aldactone today.  Our goal will be to optimize guideline directed medical therapy for heart failure and wean off of clonidine if able (if bp remains elevated despite potential coreg dose crease at follow-up, we may consider hydralazine/Isordil if needed so that clonidine can be weaned off).    3.  Left bundle branch block: Pending MRI results she may be a candidate for biventricular AICD    Follow-up 2 weeks

## 2024-07-05 ENCOUNTER — TELEPHONE (OUTPATIENT)
Dept: CARDIOLOGY | Facility: CLINIC | Age: 69
End: 2024-07-05

## 2024-07-05 DIAGNOSIS — I10 ESSENTIAL HYPERTENSION: Chronic | ICD-10-CM

## 2024-07-05 RX ORDER — CLONIDINE HYDROCHLORIDE 0.1 MG/1
0.1 TABLET ORAL 3 TIMES DAILY
Qty: 90 TABLET | Refills: 3 | Status: SHIPPED | OUTPATIENT
Start: 2024-07-05

## 2024-07-05 NOTE — TELEPHONE ENCOUNTER
The patient is requesting a refill of clonidine.  She stated she is still taking 0.1 mg TID.  I asked her how her BP has been running, and she stated it is trending high.  She took it this morning and it was 136/82.  Thank you.

## 2024-07-05 NOTE — TELEPHONE ENCOUNTER
We received notification from [facility] that the patient cancelled her appointment to have her cardiac MRI done. I reached out to the patient to see if there was a reason.  She stated her insurance wasn't going to cover this due to the facility being out of network, and she is not able to afford the out of pocket cost.  She has cancelled it at this time, however she was told by [facility] they would work on getting it approved with insurance.  They were not able to provider her a timeline.  She also mentioned she is very claustrophobic.  She would like to discuss this further at her appointment on 7/18/24.  Thanks.

## 2024-08-02 ENCOUNTER — LAB (OUTPATIENT)
Dept: LAB | Facility: HOSPITAL | Age: 69
End: 2024-08-02
Payer: MEDICARE

## 2024-08-02 ENCOUNTER — OFFICE VISIT (OUTPATIENT)
Dept: CARDIOLOGY | Facility: CLINIC | Age: 69
End: 2024-08-02
Payer: MEDICARE

## 2024-08-02 VITALS
BODY MASS INDEX: 40.08 KG/M2 | HEIGHT: 63 IN | SYSTOLIC BLOOD PRESSURE: 134 MMHG | WEIGHT: 226.2 LBS | HEART RATE: 67 BPM | DIASTOLIC BLOOD PRESSURE: 82 MMHG | OXYGEN SATURATION: 100 %

## 2024-08-02 DIAGNOSIS — I42.8 NON-ISCHEMIC CARDIOMYOPATHY: ICD-10-CM

## 2024-08-02 DIAGNOSIS — I44.7 LBBB (LEFT BUNDLE BRANCH BLOCK): Primary | ICD-10-CM

## 2024-08-02 DIAGNOSIS — I10 ESSENTIAL HYPERTENSION: Chronic | ICD-10-CM

## 2024-08-02 DIAGNOSIS — I50.22 CHRONIC SYSTOLIC HEART FAILURE: Chronic | ICD-10-CM

## 2024-08-02 LAB
ANION GAP SERPL CALCULATED.3IONS-SCNC: 7 MMOL/L (ref 5–15)
BUN SERPL-MCNC: 27 MG/DL (ref 8–23)
BUN/CREAT SERPL: 28.4 (ref 7–25)
CALCIUM SPEC-SCNC: 9.1 MG/DL (ref 8.6–10.5)
CHLORIDE SERPL-SCNC: 100 MMOL/L (ref 98–107)
CO2 SERPL-SCNC: 29 MMOL/L (ref 22–29)
CREAT SERPL-MCNC: 0.95 MG/DL (ref 0.57–1)
EGFRCR SERPLBLD CKD-EPI 2021: 65.8 ML/MIN/1.73
GLUCOSE SERPL-MCNC: 99 MG/DL (ref 65–99)
NT-PROBNP SERPL-MCNC: 781.3 PG/ML (ref 0–900)
POTASSIUM SERPL-SCNC: 5.1 MMOL/L (ref 3.5–5.2)
SODIUM SERPL-SCNC: 136 MMOL/L (ref 136–145)

## 2024-08-02 PROCEDURE — 83880 ASSAY OF NATRIURETIC PEPTIDE: CPT

## 2024-08-02 PROCEDURE — 36415 COLL VENOUS BLD VENIPUNCTURE: CPT

## 2024-08-02 PROCEDURE — 80048 BASIC METABOLIC PNL TOTAL CA: CPT

## 2024-08-02 RX ORDER — CLONIDINE HYDROCHLORIDE 0.1 MG/1
0.1 TABLET ORAL 2 TIMES DAILY
Start: 2024-08-02

## 2024-08-02 RX ORDER — CARVEDILOL 25 MG/1
25 TABLET ORAL 2 TIMES DAILY
Qty: 60 TABLET | Refills: 11 | Status: SHIPPED | OUTPATIENT
Start: 2024-08-02

## 2024-08-02 NOTE — PROGRESS NOTES
Subjective:     Encounter Date: 8/2/2024      Patient ID: Damaris Garcias is a 69 y.o. female.    Chief Complaint: follow up chronic systolic CHF, nonischemic cardiomyopathy    History of Present Illness    The patient presents to follow-up regarding her chronic systolic congestive heart failure/nonischemic cardiomyopathy.  Due to significant exertional dyspnea and CT of the coronary arteries suggesting LAD disease she recently underwent cardiac catheterization on 6/6.  This revealed nonobstructive disease, LVEF of 33.7%.  It was noted cardiac MRI would be considered for accurate EF assessment in the future, as well is further assessment of other possible etiologies of her heart failure.  At the time of the cardiac catheterization her Entresto was increased to max dose.  Despite previous recommendations to wean clonidine off she does continue to take clonidine 0.2 mg 3 times daily at the direction of her PCP.  Her blood pressures are very labile with often normal readings in the morning and more acutely elevated readings in the evening hours.    Previous notes indicated she might of had atrial fibrillation in the past but after thorough review of records there was no evidence to confirm this and Eliquis was discontinued by Dr. Sawyer.  Additionally she has had significant blood loss anemia in the past when taking Eliquis for DVT and PE in 2021.    I saw her 6/14 and she was doing about the same since her cardiac catheterization but reported morning blood pressures improved on the higher dose of Entresto.  She reported morning blood pressures were typically 130s but in the evening might reach 170s.  She had been taking Lasix 20 mg most days for the previous 1 week.  Weight was stable.  She was not having chest pain, orthopnea, PND.  She had normal renal function after increasing Entresto.  At that visit I uptitrated her Aldactone to 50 mg and ordered her cardiac MRI.    Today the patient states she had a cardiac MRI  on 7/19.  We do not have the results available yet.  She states she is doing well on the higher dose of Aldactone and has not had to take any Lasix since increasing it.  She reports her systolic blood pressures are ranging from 130s to 150s.  She has some exertional dyspnea when doing certain activities around the house, such as cleaning cabinets.  Otherwise, she denies significant shortness of breath.  She denies chest pain, orthopnea, PND.  She denies palpitations, syncope, presyncope.  She has not yet had her follow-up labs after the Aldactone dose increase.       The following portions of the patient's history were reviewed and updated as appropriate: allergies, current medications, past family history, past medical history, past social history, past surgical history and problem list.    Review of Systems   Constitutional: Negative for malaise/fatigue.   Cardiovascular:  Positive for dyspnea on exertion. Negative for chest pain, claudication, leg swelling, near-syncope, orthopnea, palpitations, paroxysmal nocturnal dyspnea and syncope.   Respiratory:  Negative for cough.    Hematologic/Lymphatic: Does not bruise/bleed easily.   Musculoskeletal:  Negative for falls.   Gastrointestinal:  Negative for bloating.   Neurological:  Negative for dizziness, light-headedness and weakness.       Allergies   Allergen Reactions    Cephalexin Shortness Of Breath    Cheese Unknown - High Severity     BLUE CHEESE: tongue swells    Codeine Shortness Of Breath    Levaquin [Levofloxacin] Anaphylaxis    Morphine Shortness Of Breath    Naproxen Shortness Of Breath    Penicillins Shortness Of Breath    Aspirin Other (See Comments)     Stopped 325 mg due to hemoptysis    Cetirizine Dizziness    Ciprofloxacin Other (See Comments)     Was taking zanaflex at the time and there is a drug interaction    Gabapentin Swelling     BLEs - not face, lips, or tongues    Keppra [Levetiracetam] Other (See Comments)     VISION LOSS    Lisinopril  Other (See Comments)     Dry cough    Meloxicam Nausea Only    Vancomycin Other (See Comments)     Vision loss    Sulfa Antibiotics Rash       Current Outpatient Medications:     acetaminophen (TYLENOL) 500 MG tablet, Take 2 tablets by mouth Every 6 (Six) Hours As Needed for Mild Pain. Takes about twice a week, Disp: , Rfl:     atorvastatin (LIPITOR) 20 MG tablet, Take 1 tablet by mouth Daily., Disp: , Rfl:     carvedilol (COREG) 6.25 MG tablet, Take 2 tablets by mouth 2 (Two) Times a Day., Disp: 180 tablet, Rfl: 3    cloNIDine (CATAPRES) 0.1 MG tablet, Take 1 tablet by mouth 3 times a day., Disp: 90 tablet, Rfl: 3    dapagliflozin Propanediol (Farxiga) 10 MG tablet, Take 10 mg by mouth Daily., Disp: 30 tablet, Rfl: 11    diclofenac (VOLTAREN) 75 MG EC tablet, Take 1 tablet by mouth Daily As Needed (joint pain). Has been taking at least once, usually twice a week, Disp: , Rfl:     Dulaglutide (Trulicity) 0.75 MG/0.5ML solution pen-injector, Inject 1.5 mg under the skin into the appropriate area as directed 1 (One) Time Per Week. on Thurs, Disp: , Rfl:     furosemide (LASIX) 20 MG tablet, TAKE 1 TABLET BY MOUTH DAILY AS NEEDED (SWELLING)., Disp: 90 tablet, Rfl: 1    glipizide (GLUCOTROL) 10 MG tablet, Take 1 tablet by mouth 2 (Two) Times a Day Before Meals., Disp: , Rfl:     insulin glargine (LANTUS, SEMGLEE) 100 UNIT/ML injection, Inject 10 Units under the skin into the appropriate area as directed Every Night., Disp: , Rfl:     Insulin Lispro (humaLOG) 100 UNIT/ML injection, Inject 10 Units under the skin into the appropriate area as directed 3 (Three) Times a Day As Needed for High Blood Sugar. *was given after hospital discharge in 10/2023- usually doesn't take-only takes if BG over 250, Disp: , Rfl:     montelukast (SINGULAIR) 10 MG tablet, Take 1 tablet by mouth Every Night., Disp: , Rfl:     nystatin (MYCOSTATIN) 966454 UNIT/GM powder, Apply 1 Application topically to the appropriate area as directed 4 (Four)  "Times a Day As Needed., Disp: , Rfl:     pantoprazole (PROTONIX) 40 MG EC tablet, Take 1 tablet by mouth Daily., Disp: , Rfl:     promethazine (PHENERGAN) 25 MG tablet, Take 1 tablet by mouth Every 6 (Six) Hours As Needed for Nausea or Vomiting., Disp: , Rfl:     sacubitril-valsartan (Entresto)  MG tablet, Take 1 tablet by mouth 2 (Two) Times a Day., Disp: 60 tablet, Rfl: 11    spironolactone (ALDACTONE) 50 MG tablet, Take 1 tablet by mouth Daily., Disp: 30 tablet, Rfl: 11    TiZANidine (ZANAFLEX) 4 MG capsule, Take 1 capsule by mouth 3 (Three) Times a Day As Needed for Muscle Spasms. Usually takes twice a day, Disp: , Rfl:     traMADol (ULTRAM) 50 MG tablet, Take 1 tablet by mouth 2 (Two) Times a Day As Needed for Moderate Pain., Disp: , Rfl:     vitamin D (ERGOCALCIFEROL) 1.25 MG (78378 UT) capsule capsule, Take 1 capsule by mouth 2 (Two) Times a Week., Disp: , Rfl:     zolpidem (AMBIEN) 10 MG tablet, Take 1 tablet by mouth every night at bedtime. EVERY NIGHT, Disp: , Rfl: 1         Objective:    /82   Pulse 67   Ht 160 cm (63\")   Wt 103 kg (226 lb 3.2 oz)   SpO2 100%   BMI 40.07 kg/m²        Vitals and nursing note reviewed.   Constitutional:       General: Not in acute distress.     Appearance: Well-developed and not in distress. Not diaphoretic.   Neck:      Vascular: No JVD.   Pulmonary:      Effort: Pulmonary effort is normal. No respiratory distress.      Breath sounds: Normal breath sounds.   Cardiovascular:      Normal rate. Regular rhythm.      Murmurs: There is no murmur.   Edema:     Peripheral edema absent.   Abdominal:      Tenderness: There is no abdominal tenderness.   Skin:     General: Skin is warm and dry.   Neurological:      Mental Status: Alert and oriented to person, place, and time.         Lab Review:   Lab Results   Component Value Date    GLUCOSE 122 (H) 06/14/2024    BUN 21 06/14/2024    CREATININE 0.85 06/14/2024    EGFR 75.2 06/14/2024    BCR 24.7 06/14/2024    K 4.5 " 06/14/2024    CO2 27.0 06/14/2024    CALCIUM 9.0 06/14/2024    ALBUMIN 4.1 04/11/2024    BILITOT 0.3 04/11/2024    AST 13 04/11/2024    ALT 13 04/11/2024                ECG 12 Lead    Date/Time: 8/2/2024 8:45 AM  Performed by: Maranda Starks APRN    Authorized by: Maranda Starks APRN  Comparison: compared with previous ECG from 6/14/2024  Similar to previous ECG  Rhythm: sinus rhythm and sinus arrhythmia  BPM: 67  Conduction: left bundle branch block    Clinical impression: abnormal EKG          Results for orders placed during the hospital encounter of 03/13/24    Adult Transthoracic Echo Complete w/ Color, Spectral and Contrast if necessary per protocol    Interpretation Summary    Left ventricular systolic function is moderately decreased.  Left ventricular ejection fraction appears to be 36 - 40%.    The following left ventricular wall segments are hypokinetic: apical inferior, mid inferior, apical septal, basal inferoseptal, mid inferoseptal, mid anteroseptal, basal inferior and basal inferoseptal.    Left ventricular diastolic function is consistent with (grade III w/high LAP) reversible restrictive pattern.    The left atrial cavity is severely dilated.    The right atrial cavity is mildly  dilated.    Estimated right ventricular systolic pressure from tricuspid regurgitation is normal (<35 mmHg).    Normal size and mildly reduced function of the right ventricle.    No significant (greater than mild) valvular pathology.    No prior TTEs available for comparison.    6/6/2024 cath report:    Impression:  1. Very mild atherosclerotic coronary disease, with no stenosis of a calcified proximal LAD. Otherwise, there is no calcified segment noted in all 3 coronary vessels are angiographically normal.   2. Dilated, nonischemic cardiomyopathy with LV end-diastolic diameter measured at 7 cm, with severe global hypokinesis and LVEF calculated at 33.7%  3. Elevated LVEDP at 24 mmHg  4. Poorly controlled systemic  hypertension       Plan:   1. TR band off in 2 hours then can discharge home  2. Continue aspirin and statin  3. Continue further aggressive medical titration for nonischemic cardiomyopathy; will increase moderate to high intensity Entresto dose today. Continue current dose of Coreg (12.5 mg twice daily in the form of two 6.25mg tablets in AM and two in PM), spironolactone 25 g daily, and Farxiga 10 mg daily. Has Lasix available for as needed use.  4. Will add BNP to this morning's labs, and have patient follow-up as planned in a few weeks to reassess any changes after increasing Entresto. I have ordered repeat labs (BMP and BNP) to be drawn that day prior to the visit.  5. As we continue aggressive titration of medical therapy, may also consider sending her then for a cardiac MRI to better understand any other potential etiology and more precisely calculate EF (and if <35%, then discuss ICD - likely BiV given LBBB)    Joseph Sawyer MD     Assessment:      Problem List Items Addressed This Visit          Cardiac and Vasculature    Essential hypertension (Chronic)    Chronic systolic heart failure (Chronic)    Overview     EF 27% 2017 nuclear stress  EF abnormal stress 2021, reportedly normal cath  EF 40%, severe SHANON, moderate LAE, moderate to severe RVE, mild MR, severe TR (RVSP 45 mm Hg), mild PI, physiologic pericardial effusion 3/2021 echo, Dr. Jasso, [facility] [place]  EF 60-65% 6/2023 echo, Dr. Hernandez, [facility]  EF 46-52% 10/2023 echo, Dr. Azevedo, [facility] [place]             LBBB (left bundle branch block) - Primary    Non-ischemic cardiomyopathy       Plan:     1.  Chronic systolic congestive heart failure: Established problem, stable.  Appears to be euvolemic on exam.  NYHA class II.    -Continue Entresto 97/103 mg twice daily  -Continue Coreg 12.5 mg twice daily  -Continue Farxiga 10 mg daily  -Continue Aldactone 50 mg daily  -Labs today to reassess renal function and electrolytes after the Aldactone dose increase  -Lasix 20 mg on  an as-needed basis for weight gain of greater than 2 pounds overnight or greater than 4 pounds in 2 days or shortness of breath/orthopnea/PND  -Heart healthy low-sodium diet  -Daily weights    -Obtain cardiac MRI results    2.  Hypertension: Improved but still uncontrolled overall.  See notes above.  Our goal will be to optimize guideline directed medical therapy for heart failure and wean off of clonidine if able (if bp remains elevated despite potential coreg dose increase as next change, we may consider hydralazine/Isordil if needed so that clonidine can be weaned off).  Labs today before making further recommendations.    3.  Left bundle branch block: Pending MRI results she may be a candidate for biventricular AICD    Follow-up 4-6 weeks; we will be in touch sooner after we obtain lab results and cardiac MRI results with any changes in the plan of care

## 2024-08-11 PROCEDURE — 93298 REM INTERROG DEV EVAL SCRMS: CPT | Performed by: EMERGENCY MEDICINE

## 2024-08-12 ENCOUNTER — TELEPHONE (OUTPATIENT)
Dept: CARDIOLOGY | Facility: CLINIC | Age: 69
End: 2024-08-12
Payer: MEDICARE

## 2024-08-12 NOTE — TELEPHONE ENCOUNTER
----- Message from Maranda Starks sent at 8/12/2024 10:08 AM CDT -----  Please let the patient know I have reviewed her cardiac MRI results and discussed this with Dr. Sawyer as well.  Overall, findings look good and her LVEF is near normal at 46% so she will not need an AICD.  No changes in the plan of care at this time other than to continue to optimize her medicines for her chronic heart failure.  Follow-up as planned to discuss further next month.  Please save this note to the chart.  Thanks.

## 2024-09-03 ENCOUNTER — TELEPHONE (OUTPATIENT)
Dept: CARDIOLOGY | Facility: CLINIC | Age: 69
End: 2024-09-03
Payer: MEDICARE

## 2024-09-03 NOTE — TELEPHONE ENCOUNTER
Reviewed ILR transmission revealing new onset  AF with 5 total episodes; longest episode lasting 1 hour and 20 mins.  Avg v rates range from 75 to 103 bpm. Patient is not currently anticoagulated.  Screen shots provided below.  Full report in [facility] for your review.  Patient has an upcoming appointment with Maranda Starks 9/13/24

## 2024-09-04 NOTE — TELEPHONE ENCOUNTER
Patient is notified. A prescription is pended to Dr Sawyer. A verbal order is also called in with information for a 30-day free trial. ISIDORO Knowles - please inform patient and send in rx for eliquis 5mg po bid to be started asap.  Can discuss further at upcoming appt with Maranda ANDERSON

## 2024-10-25 ENCOUNTER — OFFICE VISIT (OUTPATIENT)
Dept: CARDIOLOGY | Facility: CLINIC | Age: 69
End: 2024-10-25
Payer: MEDICARE

## 2024-10-25 VITALS
OXYGEN SATURATION: 90 % | SYSTOLIC BLOOD PRESSURE: 134 MMHG | HEIGHT: 63 IN | DIASTOLIC BLOOD PRESSURE: 88 MMHG | WEIGHT: 234.8 LBS | HEART RATE: 70 BPM | BODY MASS INDEX: 41.6 KG/M2

## 2024-10-25 DIAGNOSIS — I48.0 PAF (PAROXYSMAL ATRIAL FIBRILLATION): ICD-10-CM

## 2024-10-25 DIAGNOSIS — I50.22 CHRONIC SYSTOLIC HEART FAILURE: Primary | Chronic | ICD-10-CM

## 2024-10-25 DIAGNOSIS — I44.7 LBBB (LEFT BUNDLE BRANCH BLOCK): ICD-10-CM

## 2024-10-25 DIAGNOSIS — I42.8 NON-ISCHEMIC CARDIOMYOPATHY: ICD-10-CM

## 2024-10-25 DIAGNOSIS — I10 ESSENTIAL HYPERTENSION: Chronic | ICD-10-CM

## 2024-10-25 RX ORDER — CLONIDINE HYDROCHLORIDE 0.1 MG/1
0.1 TABLET ORAL AS NEEDED
Start: 2024-10-25

## 2024-10-25 NOTE — PROGRESS NOTES
Subjective:     Encounter Date: 10/25/2024      Patient ID: Damaris Garcias is a 69 y.o. female.    Chief Complaint: follow up chronic systolic CHF, nonischemic cardiomyopathy    History of Present Illness    The patient presents to follow-up regarding her chronic systolic congestive heart failure/nonischemic cardiomyopathy.  Due to significant exertional dyspnea and CT of the coronary arteries suggesting LAD disease she recently underwent cardiac catheterization on 6/6.  This revealed nonobstructive disease, LVEF of 33.7%.  It was noted cardiac MRI would be considered for accurate EF assessment in the future, as well is further assessment of other possible etiologies of her heart failure.  At the time of the cardiac catheterization her Entresto was increased to max dose.  Despite previous recommendations to wean clonidine off she does continue to take clonidine 0.2 mg 3 times daily at the direction of her PCP.  Her blood pressures are very labile with often normal readings in the morning and more acutely elevated readings in the evening hours.    Previous notes indicated she might of had atrial fibrillation in the past but after thorough review of records there was no evidence to confirm this and Eliquis was discontinued by Dr. Sawyer.  Additionally she has had significant blood loss anemia in the past when taking Eliquis for DVT and PE in 2021.    I saw her 6/14 and she was doing about the same since her cardiac catheterization but reported morning blood pressures improved on the higher dose of Entresto.  She reported morning blood pressures were typically 130s but in the evening might reach 170s.  She had been taking Lasix 20 mg most days for the previous 1 week.  Weight was stable.  She was not having chest pain, orthopnea, PND.  She had normal renal function after increasing Entresto.  At that visit I uptitrated her Aldactone to 50 mg and ordered her cardiac MRI.    I saw her in early August.  She had her  cardiac MRI completed in July but results were not yet available.  She was doing well on the higher dose of Aldactone and not requiring Lasix.  Blood pressure was 130s to 150s.  She was still having some degree of exertional dyspnea but no chest pain, orthopnea, PND.  After checking follow-up labs I did uptitrate her Coreg to max dose and reduced her clonidine further to 0.1 mg twice daily.    We later received her cardiac MRI results.  See below.  LVEF is 46%.    We later discovered based on loop recorder interrogation that she had 5 episodes of atrial fibrillation on 9/1 and Eliquis was prescribed.    Today the patient presents for follow-up and states she is doing well.  She has not taken any Lasix in a couple of months.  Her exertional dyspnea is mild and stable.  She denies chest pain, orthopnea, PND, rapid weight gain.  She states her systolic blood pressure is mostly in the 130s but occasionally she has significant spikes/elevations.  She does not recall feeling any palpitations on 9/1 or any other time, but believes she was sick with a GI bug around that time.    The following portions of the patient's history were reviewed and updated as appropriate: allergies, current medications, past family history, past medical history, past social history, past surgical history and problem list.    Review of Systems   Constitutional: Negative for malaise/fatigue.   Cardiovascular:  Positive for dyspnea on exertion. Negative for chest pain, claudication, leg swelling, near-syncope, orthopnea, palpitations, paroxysmal nocturnal dyspnea and syncope.   Respiratory:  Negative for cough.    Hematologic/Lymphatic: Does not bruise/bleed easily.   Musculoskeletal:  Negative for falls.   Gastrointestinal:  Negative for bloating.   Neurological:  Positive for weakness. Negative for dizziness and light-headedness.       Allergies   Allergen Reactions    Cephalexin Shortness Of Breath    Cheese Unknown - High Severity     BLUE  CHEESE: tongue swells    Codeine Shortness Of Breath    Levaquin [Levofloxacin] Anaphylaxis    Morphine Shortness Of Breath    Naproxen Shortness Of Breath    Penicillins Shortness Of Breath    Aspirin Other (See Comments)     Stopped 325 mg due to hemoptysis    Cetirizine Dizziness    Ciprofloxacin Other (See Comments)     Was taking zanaflex at the time and there is a drug interaction    Gabapentin Swelling     BLEs - not face, lips, or tongues    Keppra [Levetiracetam] Other (See Comments)     VISION LOSS    Lisinopril Other (See Comments)     Dry cough    Meloxicam Nausea Only    Vancomycin Other (See Comments)     Vision loss    Sulfa Antibiotics Rash       Current Outpatient Medications:     acetaminophen (TYLENOL) 500 MG tablet, Take 2 tablets by mouth Every 6 (Six) Hours As Needed for Mild Pain. Takes about twice a week, Disp: , Rfl:     apixaban (ELIQUIS) 5 MG tablet tablet, Take 1 tablet by mouth 2 (Two) Times a Day., Disp: 60 tablet, Rfl: 11    atorvastatin (LIPITOR) 20 MG tablet, Take 1 tablet by mouth Daily., Disp: , Rfl:     carvedilol (COREG) 25 MG tablet, Take 1 tablet by mouth 2 (Two) Times a Day., Disp: 60 tablet, Rfl: 11    cloNIDine (CATAPRES) 0.1 MG tablet, Take 1 tablet by mouth As Needed for High Blood Pressure., Disp: , Rfl:     dapagliflozin Propanediol (Farxiga) 10 MG tablet, Take 10 mg by mouth Daily., Disp: 30 tablet, Rfl: 11    diclofenac (VOLTAREN) 75 MG EC tablet, Take 1 tablet by mouth Daily As Needed (joint pain). Has been taking at least once, usually twice a week, Disp: , Rfl:     Dulaglutide (Trulicity) 0.75 MG/0.5ML solution pen-injector, Inject 1.5 mg under the skin into the appropriate area as directed 1 (One) Time Per Week. on Thurs, Disp: , Rfl:     furosemide (LASIX) 20 MG tablet, TAKE 1 TABLET BY MOUTH DAILY AS NEEDED (SWELLING)., Disp: 90 tablet, Rfl: 1    glipizide (GLUCOTROL) 10 MG tablet, Take 1 tablet by mouth 2 (Two) Times a Day Before Meals., Disp: , Rfl:      "montelukast (SINGULAIR) 10 MG tablet, Take 1 tablet by mouth Every Night., Disp: , Rfl:     nystatin (MYCOSTATIN) 981249 UNIT/GM powder, Apply 1 Application topically to the appropriate area as directed 4 (Four) Times a Day As Needed., Disp: , Rfl:     pantoprazole (PROTONIX) 40 MG EC tablet, Take 1 tablet by mouth Daily., Disp: , Rfl:     promethazine (PHENERGAN) 25 MG tablet, Take 1 tablet by mouth Every 6 (Six) Hours As Needed for Nausea or Vomiting., Disp: , Rfl:     sacubitril-valsartan (Entresto)  MG tablet, Take 1 tablet by mouth 2 (Two) Times a Day., Disp: 60 tablet, Rfl: 11    spironolactone (ALDACTONE) 50 MG tablet, Take 1 tablet by mouth Daily., Disp: 30 tablet, Rfl: 11    TiZANidine (ZANAFLEX) 4 MG capsule, Take 1 capsule by mouth 3 (Three) Times a Day As Needed for Muscle Spasms. Usually takes twice a day, Disp: , Rfl:     traMADol (ULTRAM) 50 MG tablet, Take 1 tablet by mouth 2 (Two) Times a Day As Needed for Moderate Pain., Disp: , Rfl:     vitamin D (ERGOCALCIFEROL) 1.25 MG (51097 UT) capsule capsule, Take 1 capsule by mouth 2 (Two) Times a Week., Disp: , Rfl:     zolpidem (AMBIEN) 10 MG tablet, Take 1 tablet by mouth every night at bedtime. EVERY NIGHT, Disp: , Rfl: 1         Objective:    /88   Pulse 70   Ht 160 cm (63\")   Wt 107 kg (234 lb 12.8 oz)   SpO2 90%   BMI 41.59 kg/m²        Vitals and nursing note reviewed.   Constitutional:       General: Not in acute distress.     Appearance: Well-developed and not in distress. Not diaphoretic.   Neck:      Vascular: No JVD.   Pulmonary:      Effort: Pulmonary effort is normal. No respiratory distress.      Breath sounds: Normal breath sounds.   Cardiovascular:      Normal rate. Regular rhythm.      Murmurs: There is no murmur.   Edema:     Peripheral edema absent.   Abdominal:      Tenderness: There is no abdominal tenderness.   Skin:     General: Skin is warm and dry.   Neurological:      Mental Status: Alert and oriented to person, " place, and time.         Lab Review:   Lab Results   Component Value Date    GLUCOSE 99 08/02/2024    BUN 27 (H) 08/02/2024    CREATININE 0.95 08/02/2024    EGFR 65.8 08/02/2024    BCR 28.4 (H) 08/02/2024    K 5.1 08/02/2024    CO2 29.0 08/02/2024    CALCIUM 9.1 08/02/2024    ALBUMIN 4.1 04/11/2024    BILITOT 0.3 04/11/2024    AST 13 04/11/2024    ALT 13 04/11/2024                ECG 12 Lead    Date/Time: 10/25/2024 9:11 AM  Performed by: Maranda Starks APRN    Authorized by: Maranda Starks APRN  Comparison: compared with previous ECG from 8/2/2024  Similar to previous ECG  Rhythm: sinus rhythm  Ectopy: atrial premature contractions  BPM: 70  Conduction: left bundle branch block    Clinical impression: abnormal EKG          Results for orders placed during the hospital encounter of 03/13/24    Adult Transthoracic Echo Complete w/ Color, Spectral and Contrast if necessary per protocol    Interpretation Summary    Left ventricular systolic function is moderately decreased.  Left ventricular ejection fraction appears to be 36 - 40%.    The following left ventricular wall segments are hypokinetic: apical inferior, mid inferior, apical septal, basal inferoseptal, mid inferoseptal, mid anteroseptal, basal inferior and basal inferoseptal.    Left ventricular diastolic function is consistent with (grade III w/high LAP) reversible restrictive pattern.    The left atrial cavity is severely dilated.    The right atrial cavity is mildly  dilated.    Estimated right ventricular systolic pressure from tricuspid regurgitation is normal (<35 mmHg).    Normal size and mildly reduced function of the right ventricle.    No significant (greater than mild) valvular pathology.    No prior TTEs available for comparison.    6/6/2024 cath report:    Impression:  1. Very mild atherosclerotic coronary disease, with no stenosis of a calcified proximal LAD. Otherwise, there is no calcified segment noted in all 3 coronary vessels are  angiographically normal.   2. Dilated, nonischemic cardiomyopathy with LV end-diastolic diameter measured at 7 cm, with severe global hypokinesis and LVEF calculated at 33.7%  3. Elevated LVEDP at 24 mmHg  4. Poorly controlled systemic hypertension       Plan:   1. TR band off in 2 hours then can discharge home  2. Continue aspirin and statin  3. Continue further aggressive medical titration for nonischemic cardiomyopathy; will increase moderate to high intensity Entresto dose today. Continue current dose of Coreg (12.5 mg twice daily in the form of two 6.25mg tablets in AM and two in PM), spironolactone 25 g daily, and Farxiga 10 mg daily. Has Lasix available for as needed use.  4. Will add BNP to this morning's labs, and have patient follow-up as planned in a few weeks to reassess any changes after increasing Entresto. I have ordered repeat labs (BMP and BNP) to be drawn that day prior to the visit.  5. As we continue aggressive titration of medical therapy, may also consider sending her then for a cardiac MRI to better understand any other potential etiology and more precisely calculate EF (and if <35%, then discuss ICD - likely BiV given LBBB)    Joseph Sawyer MD         Assessment:      Problem List Items Addressed This Visit          Cardiac and Vasculature    Essential hypertension (Chronic)    Relevant Medications    cloNIDine (CATAPRES) 0.1 MG tablet    Chronic systolic heart failure - Primary (Chronic)    Overview     EF 27% 2017 nuclear stress  EF abnormal stress 2021, reportedly normal cath  EF 40%, severe SHANON, moderate LAE, moderate to severe RVE, mild MR, severe TR (RVSP 45 mm Hg), mild PI, physiologic pericardial effusion 3/2021 echo, Dr. Jasso, [facility] [place]  EF 60-65% 6/2023 echo, Dr. Hernandez, [facility]  EF 46-52% 10/2023 echo, Dr. Azevedo, [facility] [place]             LBBB (left bundle branch block)    PAF (paroxysmal atrial fibrillation)    Non-ischemic cardiomyopathy       Plan:     1.  Chronic systolic congestive  heart failure: Established problem, stable.  Appears to be euvolemic on exam.  NYHA class II.  LVEF 46% by cardiac MRI    -Continue Entresto 97/103 mg twice daily  -Continue Coreg 25 mg twice daily  -Continue Farxiga 10 mg daily  -Continue Aldactone 50 mg daily  -Lasix 20 mg on an as-needed basis for weight gain of greater than 2 pounds overnight or greater than 4 pounds in 2 days or shortness of breath/orthopnea/PND  -Heart healthy low-sodium diet  -Daily weights    2.  Hypertension: Improved/well-controlled.  I have recommended she reduce her clonidine to as needed use only-0.1 mg for blood pressure greater than 180/100  Otherwise, continue current medical therapy    3.  Left bundle branch block: Chronic.    4.  Paroxysmal atrial fibrillation: She had 5 episodes which were rate controlled on 9/1.  Longest episode was approximately 1 hour 20 minutes.  She states she was sick with GI symptoms around that time but does not recall having palpitations or other symptoms to suggest AF.  She is in normal sinus rhythm on EKG today.  She will continue Eliquis 5 mg twice daily for stroke prophylaxis.  She will monitor for signs and symptoms of bleeding, which may warrant consideration for Watchman device implantation as an alternative to long-term anticoagulation    Follow-up 3 months, call sooner with symptoms or concerns

## 2024-12-09 DIAGNOSIS — I10 ESSENTIAL HYPERTENSION: Chronic | ICD-10-CM

## 2024-12-10 RX ORDER — CLONIDINE HYDROCHLORIDE 0.1 MG/1
0.1 TABLET ORAL AS NEEDED
Qty: 90 TABLET | Refills: 0 | Status: SHIPPED | OUTPATIENT
Start: 2024-12-10

## 2025-01-12 ENCOUNTER — HOSPITAL ENCOUNTER (EMERGENCY)
Facility: HOSPITAL | Age: 70
Discharge: HOME | End: 2025-01-12
Attending: FAMILY MEDICINE | Admitting: FAMILY MEDICINE
Payer: MEDICARE

## 2025-01-12 ENCOUNTER — APPOINTMENT (OUTPATIENT)
Dept: CT IMAGING | Facility: HOSPITAL | Age: 70
End: 2025-01-12
Payer: MEDICARE

## 2025-01-12 VITALS
HEIGHT: 63 IN | DIASTOLIC BLOOD PRESSURE: 87 MMHG | WEIGHT: 245 LBS | SYSTOLIC BLOOD PRESSURE: 144 MMHG | RESPIRATION RATE: 20 BRPM | TEMPERATURE: 98.4 F | OXYGEN SATURATION: 98 % | HEART RATE: 69 BPM | BODY MASS INDEX: 43.41 KG/M2

## 2025-01-12 DIAGNOSIS — I16.0 HYPERTENSIVE URGENCY: Primary | ICD-10-CM

## 2025-01-12 DIAGNOSIS — I63.81 THALAMIC STROKE: ICD-10-CM

## 2025-01-12 LAB
ALBUMIN SERPL-MCNC: 3.9 G/DL (ref 3.5–5.2)
ALBUMIN/GLOB SERPL: 1.2 G/DL
ALP SERPL-CCNC: 90 U/L (ref 39–117)
ALT SERPL W P-5'-P-CCNC: 14 U/L (ref 1–33)
AMMONIA BLD-SCNC: <10 UMOL/L (ref 11–51)
ANION GAP SERPL CALCULATED.3IONS-SCNC: 10 MMOL/L (ref 5–15)
APTT PPP: 29.2 SECONDS (ref 24.5–36)
AST SERPL-CCNC: 13 U/L (ref 1–32)
BASOPHILS # BLD AUTO: 0.07 10*3/MM3 (ref 0–0.2)
BASOPHILS NFR BLD AUTO: 0.7 % (ref 0–1.5)
BILIRUB SERPL-MCNC: 0.2 MG/DL (ref 0–1.2)
BUN SERPL-MCNC: 32 MG/DL (ref 8–23)
BUN/CREAT SERPL: 34 (ref 7–25)
CALCIUM SPEC-SCNC: 8.9 MG/DL (ref 8.6–10.5)
CHLORIDE SERPL-SCNC: 107 MMOL/L (ref 98–107)
CO2 SERPL-SCNC: 24 MMOL/L (ref 22–29)
CREAT SERPL-MCNC: 0.94 MG/DL (ref 0.57–1)
D-LACTATE SERPL-SCNC: 1.3 MMOL/L (ref 0.5–2)
DEPRECATED RDW RBC AUTO: 49.2 FL (ref 37–54)
EGFRCR SERPLBLD CKD-EPI 2021: 66.6 ML/MIN/1.73
EOSINOPHIL # BLD AUTO: 0.25 10*3/MM3 (ref 0–0.4)
EOSINOPHIL NFR BLD AUTO: 2.7 % (ref 0.3–6.2)
ERYTHROCYTE [DISTWIDTH] IN BLOOD BY AUTOMATED COUNT: 17.9 % (ref 12.3–15.4)
GLOBULIN UR ELPH-MCNC: 3.3 GM/DL
GLUCOSE SERPL-MCNC: 144 MG/DL (ref 65–99)
HCT VFR BLD AUTO: 44.3 % (ref 34–46.6)
HGB BLD-MCNC: 13.4 G/DL (ref 12–15.9)
IMM GRANULOCYTES # BLD AUTO: 0.02 10*3/MM3 (ref 0–0.05)
IMM GRANULOCYTES NFR BLD AUTO: 0.2 % (ref 0–0.5)
INR PPP: 1.06 (ref 0.91–1.09)
LYMPHOCYTES # BLD AUTO: 1.91 10*3/MM3 (ref 0.7–3.1)
LYMPHOCYTES NFR BLD AUTO: 20.4 % (ref 19.6–45.3)
MAGNESIUM SERPL-MCNC: 2.3 MG/DL (ref 1.6–2.4)
MCH RBC QN AUTO: 23.8 PG (ref 26.6–33)
MCHC RBC AUTO-ENTMCNC: 30.2 G/DL (ref 31.5–35.7)
MCV RBC AUTO: 78.7 FL (ref 79–97)
MONOCYTES # BLD AUTO: 0.65 10*3/MM3 (ref 0.1–0.9)
MONOCYTES NFR BLD AUTO: 6.9 % (ref 5–12)
NEUTROPHILS NFR BLD AUTO: 6.48 10*3/MM3 (ref 1.7–7)
NEUTROPHILS NFR BLD AUTO: 69.1 % (ref 42.7–76)
NRBC BLD AUTO-RTO: 0 /100 WBC (ref 0–0.2)
PLATELET # BLD AUTO: 311 10*3/MM3 (ref 140–450)
PMV BLD AUTO: 11.4 FL (ref 6–12)
POTASSIUM SERPL-SCNC: 4.6 MMOL/L (ref 3.5–5.2)
PROT SERPL-MCNC: 7.2 G/DL (ref 6–8.5)
PROTHROMBIN TIME: 14.4 SECONDS (ref 11.8–14.8)
RBC # BLD AUTO: 5.63 10*6/MM3 (ref 3.77–5.28)
SODIUM SERPL-SCNC: 141 MMOL/L (ref 136–145)
WBC NRBC COR # BLD AUTO: 9.38 10*3/MM3 (ref 3.4–10.8)

## 2025-01-12 PROCEDURE — 83735 ASSAY OF MAGNESIUM: CPT | Performed by: FAMILY MEDICINE

## 2025-01-12 PROCEDURE — 70450 CT HEAD/BRAIN W/O DYE: CPT

## 2025-01-12 PROCEDURE — 85730 THROMBOPLASTIN TIME PARTIAL: CPT | Performed by: FAMILY MEDICINE

## 2025-01-12 PROCEDURE — 85025 COMPLETE CBC W/AUTO DIFF WBC: CPT | Performed by: FAMILY MEDICINE

## 2025-01-12 PROCEDURE — 80053 COMPREHEN METABOLIC PANEL: CPT | Performed by: FAMILY MEDICINE

## 2025-01-12 PROCEDURE — 83605 ASSAY OF LACTIC ACID: CPT | Performed by: FAMILY MEDICINE

## 2025-01-12 PROCEDURE — 82140 ASSAY OF AMMONIA: CPT | Performed by: FAMILY MEDICINE

## 2025-01-12 PROCEDURE — 99284 EMERGENCY DEPT VISIT MOD MDM: CPT

## 2025-01-12 PROCEDURE — 85610 PROTHROMBIN TIME: CPT | Performed by: FAMILY MEDICINE

## 2025-01-12 RX ORDER — LABETALOL HYDROCHLORIDE 5 MG/ML
10 INJECTION, SOLUTION INTRAVENOUS ONCE
Status: DISCONTINUED | OUTPATIENT
Start: 2025-01-12 | End: 2025-01-12 | Stop reason: HOSPADM

## 2025-01-12 RX ORDER — SODIUM CHLORIDE 0.9 % (FLUSH) 0.9 %
10 SYRINGE (ML) INJECTION AS NEEDED
Status: DISCONTINUED | OUTPATIENT
Start: 2025-01-12 | End: 2025-01-12 | Stop reason: HOSPADM

## 2025-01-12 NOTE — ED PROVIDER NOTES
HPI:    Patient is a 69-year-old white female who recently was diagnosed with a stroke 1 week ago on January 5, 2025 at [facility] in [facility].  Patient was then transferred to [facility] secondary to prior stroke.  No MRIs was done CT scans were reevaluated and repeated and patient was later discharged with instructions to follow-up and start physical therapy.  The  of the patient who is a primary historian comes in because he states the patient seems to be a little more altered today and the fact of the worse that she is saying.  When asking certain questions certain responses of the answers are not appropriate.  Overall the patient states she is not having any pain no chest pain, headache or visual changes.  She is not having any worsening weakness than from when she initially had a stroke.  Only changes this worsening confusion or aphasia.  Last thing she ate was this morning at 1130 and 12:00 port steak with potatoes and a diet sun drop      REVIEW OF SYSTEMS    CONSTITUTIONAL:  No complaints of fever, chills,or weakness  EYES:  No complaints of discharge   ENT: No complaints of sore throat or ear pain  CARDIOVASCULAR:  No complaints of chest pain, palpitations, or swelling  RESPIRATORY:  No complaints of cough or shortness of breath  GI:  No complaints of abdominal pain, nausea, vomiting, or diarrhea  MUSCULOSKELETAL:  No complaints of back pain  SKIN:  No complaints of rash  NEUROLOGIC: Positive for general weakness and aphasia   ENDOCRINE:  No complaints of polyuria or polydipsia  LYMPHATIC:  No complaints of swollen glands  GENITOURINARY: No complaints of urinary frequency or hematuria        PAST MEDICAL HISTORY  Past Medical History:   Diagnosis Date    Afib 10/2023    Bradycardia     Cardiomyopathy     CHF (congestive heart failure)     Chronic kidney disease     Coronary arteriosclerosis     Diabetes mellitus     Diverticulitis     GERD  (gastroesophageal reflux disease)     HLD (hyperlipidemia)     HTN (hypertension)     Hx of pulmonary embolus     Hypercholesterolemia     LBBB (left bundle branch block)     Obesity     Pulmonary embolism     Rectal bleeding     RLS (restless legs syndrome)     Stroke     Vitamin D deficiency        FAMILY HISTORY  Family History   Problem Relation Age of Onset    Heart failure Mother     Hypertension Mother     Heart disease Mother         CABG  and     Diabetes type II Mother     Heart failure Father     Hyperlipidemia Father     Hypertension Father     Heart disease Father         CABG     Diabetes type II Father     Asthma Father     Hypertension Sister     Ulcerative colitis Sister     Stroke Sister         eye    Hypertension Brother     Diabetes type II Brother     Heart disease Brother     Heart attack Brother     Heart failure Brother     Arrhythmia Maternal Aunt         afib?    Heart failure Maternal Grandmother     Heart disease Maternal Grandmother     Heart disease Maternal Grandfather     Stroke Paternal Grandmother     Heart disease Paternal Grandmother     Diabetes type II Paternal Grandmother     Heart attack Paternal Grandfather         after gallbladder    Heart disease Paternal Grandfather        SOCIAL HISTORY  Social History     Socioeconomic History    Marital status:    Tobacco Use    Smoking status: Former     Current packs/day: 0.00     Average packs/day: 0.3 packs/day for 21.0 years (5.3 ttl pk-yrs)     Types: Cigarettes     Start date:      Quit date:      Years since quittin.0    Smokeless tobacco: Never   Vaping Use    Vaping status: Former    Start date: 2014    Quit date: 2014    Substances: Flavoring   Substance and Sexual Activity    Alcohol use: Not Currently     Comment: OCC a little IN THE PAST - never a heavy drinker    Drug use: Never    Sexual activity: Yes     Partners: Male     Birth control/protection: Hysterectomy        IMMUNIZATION HISTORY  Deferred to primary care physician.    SURGICAL HISTORY  Past Surgical History:   Procedure Laterality Date    APPENDECTOMY  1970    CARDIAC CATHETERIZATION  2021    AT [facility]- Dr. Jasso - mild, diffuse, non-obstructive CAD    CARDIAC CATHETERIZATION N/A 6/6/2024    Procedure: Coronary angiography;  Surgeon: Joseph Sawyer MD;  Location:  [facility] CATH INVASIVE LOCATION;  Service: Cardiology;  Laterality: N/A;    CARDIAC CATHETERIZATION N/A 6/6/2024    Procedure: Percutaneous Coronary Intervention;  Surgeon: Joseph Sawyer MD;  Location:  [facility] CATH INVASIVE LOCATION;  Service: Cardiology;  Laterality: N/A;    CHOLECYSTECTOMY  1991    HYSTERECTOMY  1989    LEG SURGERY Left 2015    CALCIFIED TUMOR    TUBAL ABDOMINAL LIGATION  1982    TUMOR REMOVAL         CURRENT MEDICATIONS    Current Facility-Administered Medications:     labetalol (NORMODYNE,TRANDATE) injection 10 mg, 10 mg, Intravenous, Once, David Chung Jr., MD    [COMPLETED] Insert Peripheral IV, , , Once **AND** sodium chloride 0.9 % flush 10 mL, 10 mL, Intravenous, PRN, David Chung Jr., MD    Current Outpatient Medications:     acetaminophen (TYLENOL) 500 MG tablet, Take 2 tablets by mouth Every 6 (Six) Hours As Needed for Mild Pain. Takes about twice a week, Disp: , Rfl:     apixaban (ELIQUIS) 5 MG tablet tablet, Take 1 tablet by mouth 2 (Two) Times a Day., Disp: 60 tablet, Rfl: 11    atorvastatin (LIPITOR) 20 MG tablet, Take 1 tablet by mouth Daily., Disp: , Rfl:     carvedilol (COREG) 25 MG tablet, Take 1 tablet by mouth 2 (Two) Times a Day., Disp: 60 tablet, Rfl: 11    cloNIDine (CATAPRES) 0.1 MG tablet, Take 1 tablet by mouth As Needed for High Blood Pressure (blood pressure greater than 180/100)., Disp: 90 tablet, Rfl: 0    dapagliflozin Propanediol (Farxiga) 10 MG tablet, Take 10 mg by mouth Daily., Disp: 30 tablet, Rfl: 11    diclofenac (VOLTAREN) 75 MG EC tablet, Take 1 tablet by mouth Daily As Needed  (joint pain). Has been taking at least once, usually twice a week, Disp: , Rfl:     Dulaglutide (Trulicity) 0.75 MG/0.5ML solution pen-injector, Inject 1.5 mg under the skin into the appropriate area as directed 1 (One) Time Per Week. on Thurs, Disp: , Rfl:     furosemide (LASIX) 20 MG tablet, TAKE 1 TABLET BY MOUTH DAILY AS NEEDED (SWELLING)., Disp: 90 tablet, Rfl: 1    glipizide (GLUCOTROL) 10 MG tablet, Take 1 tablet by mouth 2 (Two) Times a Day Before Meals., Disp: , Rfl:     montelukast (SINGULAIR) 10 MG tablet, Take 1 tablet by mouth Every Night., Disp: , Rfl:     nystatin (MYCOSTATIN) 470246 UNIT/GM powder, Apply 1 Application topically to the appropriate area as directed 4 (Four) Times a Day As Needed., Disp: , Rfl:     pantoprazole (PROTONIX) 40 MG EC tablet, Take 1 tablet by mouth Daily., Disp: , Rfl:     promethazine (PHENERGAN) 25 MG tablet, Take 1 tablet by mouth Every 6 (Six) Hours As Needed for Nausea or Vomiting., Disp: , Rfl:     sacubitril-valsartan (Entresto)  MG tablet, Take 1 tablet by mouth 2 (Two) Times a Day., Disp: 60 tablet, Rfl: 11    spironolactone (ALDACTONE) 50 MG tablet, Take 1 tablet by mouth Daily., Disp: 30 tablet, Rfl: 11    TiZANidine (ZANAFLEX) 4 MG capsule, Take 1 capsule by mouth 3 (Three) Times a Day As Needed for Muscle Spasms. Usually takes twice a day, Disp: , Rfl:     traMADol (ULTRAM) 50 MG tablet, Take 1 tablet by mouth 2 (Two) Times a Day As Needed for Moderate Pain., Disp: , Rfl:     vitamin D (ERGOCALCIFEROL) 1.25 MG (34629 UT) capsule capsule, Take 1 capsule by mouth 2 (Two) Times a Week., Disp: , Rfl:     zolpidem (AMBIEN) 10 MG tablet, Take 1 tablet by mouth every night at bedtime. EVERY NIGHT, Disp: , Rfl: 1    ALLERGIES  Allergies   Allergen Reactions    Cephalexin Shortness Of Breath    Cheese Unknown - High Severity     BLUE CHEESE: tongue swells    Codeine Shortness Of Breath    Levaquin [Levofloxacin] Anaphylaxis    Morphine Shortness Of Breath     "Naproxen Shortness Of Breath    Penicillins Shortness Of Breath    Aspirin Other (See Comments)     Stopped 325 mg due to hemoptysis    Cetirizine Dizziness    Ciprofloxacin Other (See Comments)     Was taking zanaflex at the time and there is a drug interaction    Gabapentin Swelling     BLEs - not face, lips, or tongues    Keppra [Levetiracetam] Other (See Comments)     VISION LOSS    Lisinopril Other (See Comments)     Dry cough    Meloxicam Nausea Only    Vancomycin Other (See Comments)     Vision loss    Sulfa Antibiotics Rash       Neuro exam    VITAL SIGNS:   /84   Pulse 71   Temp 98.4 °F (36.9 °C) (Oral)   Resp 18   Ht 160 cm (63\")   Wt 111 kg (245 lb)   SpO2 100%   BMI 43.40 kg/m²     Constitutional: Patient is alert and in no distress.  Patient with generalized body discomfort.    ENT: There is a normal pharynx with no acute erythema or exudate and oral mucosa is moist.  Nose is clear with no drainage.  Tympanic membranes intact and non-erythemic.    Cardiovascular: S1-S2 regular rate and rhythm.  No murmurs, rubs or gallops are noted.    Respiratory: Patient is clear to auscultation bilaterally with no wheezing or rhonchi.  Chest wall is nontender.  There are no external lesions on the chest.  There is no crepitance.    Abdomen: Soft, nontender.  Bowel sounds are normal in all 4 quadrants. There is no rebound or guarding noted.  There is no abdominal distention or hepatosplenomegaly.    Genitourinary: Patient is voiding appropriately.    Integument: No acute lesions noted.  Color appears to be normal.    Neurological: CN's 2-12 is grossly intact there is no focal deficits.  Patient does have some expressive aphasia when asked certain questions such as what is her blood pressure medication and she gives a blood pressure number.    Briana Coma Scale: 15    NIH SCORE: NIH Stroke Scale/Score (NIHSS) - MDCalc  Calculated on Jan 12 2025 4:17 PM  3 points -> NIH Stroke " Scale      RADIOLOGY/PROCEDURES      CT Head Without Contrast   Final Result   Impression:         16 x 12 mm zone of low-attenuation within the left thalamus, favored to   represent a subacute lacunar infarct.       No intracranial hemorrhage or significant mass effect.       This report was signed and finalized on 1/12/2025 3:29 PM by Darin Florian.                  FUTURE APPOINTMENTS     Future Appointments   Date Time Provider Department Center   1/27/2025  8:00 AM Maranda Starks, [facility]            COURSE & MEDICAL DECISION MAKING     Patient's partial differential diagnosis can include:    TIA, ischemic stroke, hypertensive urgency, hemorrhagic stroke, electrolyte abnormality, traumatic brain injury, penetrating brain trauma, brain tumor, atypical migraine, hemiplegic migraine, other headache, UTI, conversion disorder, dementia, delirium, and others    No acute findings of intracerebral bleeding or stroke.  There is a lacunar infarct in the left thalamus.  Follow-up outpatient with physical therapy and neurology is recommended.  No acute need for admission to the hospital for further evaluation.  No edema or mass effect noted on CT scan head.    Patient's level of risk: Moderate       CRITICAL CARE    CRITICAL CARE: No    CRITICAL CARE TIME: None      Also Old charts were reviewed per [employer] EMR.  Pertinent details are summarized above.  All laboratory, radiologic, and EKG studies that were performed in the Emergency Department were a necessary part of the evaluation needed to exclude unstable or  emergent medical conditions:     Patient was hemodynamically and neurologically stable in the ED.   Pertinent studies were reviewed as above.     Recent Results (from the past 24 hours)   Comprehensive Metabolic Panel    Collection Time: 01/12/25  3:04 PM    Specimen: Blood   Result Value Ref Range    Glucose 144 (H) 65 - 99 mg/dL    BUN 32 (H) 8 - 23 mg/dL    Creatinine 0.94 0.57 - 1.00 mg/dL    Sodium  141 136 - 145 mmol/L    Potassium 4.6 3.5 - 5.2 mmol/L    Chloride 107 98 - 107 mmol/L    CO2 24.0 22.0 - 29.0 mmol/L    Calcium 8.9 8.6 - 10.5 mg/dL    Total Protein 7.2 6.0 - 8.5 g/dL    Albumin 3.9 3.5 - 5.2 g/dL    ALT (SGPT) 14 1 - 33 U/L    AST (SGOT) 13 1 - 32 U/L    Alkaline Phosphatase 90 39 - 117 U/L    Total Bilirubin 0.2 0.0 - 1.2 mg/dL    Globulin 3.3 gm/dL    A/G Ratio 1.2 g/dL    BUN/Creatinine Ratio 34.0 (H) 7.0 - 25.0    Anion Gap 10.0 5.0 - 15.0 mmol/L    eGFR 66.6 >60.0 mL/min/1.73   Protime-INR    Collection Time: 01/12/25  3:04 PM    Specimen: Blood   Result Value Ref Range    Protime 14.4 11.8 - 14.8 Seconds    INR 1.06 0.91 - 1.09   aPTT    Collection Time: 01/12/25  3:04 PM    Specimen: Blood   Result Value Ref Range    PTT 29.2 24.5 - 36.0 seconds   Lactic Acid, Plasma    Collection Time: 01/12/25  3:04 PM    Specimen: Blood   Result Value Ref Range    Lactate 1.3 0.5 - 2.0 mmol/L   Magnesium    Collection Time: 01/12/25  3:04 PM    Specimen: Blood   Result Value Ref Range    Magnesium 2.3 1.6 - 2.4 mg/dL   CBC Auto Differential    Collection Time: 01/12/25  3:04 PM    Specimen: Blood   Result Value Ref Range    WBC 9.38 3.40 - 10.80 10*3/mm3    RBC 5.63 (H) 3.77 - 5.28 10*6/mm3    Hemoglobin 13.4 12.0 - 15.9 g/dL    Hematocrit 44.3 34.0 - 46.6 %    MCV 78.7 (L) 79.0 - 97.0 fL    MCH 23.8 (L) 26.6 - 33.0 pg    MCHC 30.2 (L) 31.5 - 35.7 g/dL    RDW 17.9 (H) 12.3 - 15.4 %    RDW-SD 49.2 37.0 - 54.0 fl    MPV 11.4 6.0 - 12.0 fL    Platelets 311 140 - 450 10*3/mm3    Neutrophil % 69.1 42.7 - 76.0 %    Lymphocyte % 20.4 19.6 - 45.3 %    Monocyte % 6.9 5.0 - 12.0 %    Eosinophil % 2.7 0.3 - 6.2 %    Basophil % 0.7 0.0 - 1.5 %    Immature Grans % 0.2 0.0 - 0.5 %    Neutrophils, Absolute 6.48 1.70 - 7.00 10*3/mm3    Lymphocytes, Absolute 1.91 0.70 - 3.10 10*3/mm3    Monocytes, Absolute 0.65 0.10 - 0.90 10*3/mm3    Eosinophils, Absolute 0.25 0.00 - 0.40 10*3/mm3    Basophils, Absolute 0.07 0.00 -  0.20 10*3/mm3    Immature Grans, Absolute 0.02 0.00 - 0.05 10*3/mm3    nRBC 0.0 0.0 - 0.2 /100 WBC   Ammonia    Collection Time: 01/12/25  3:11 PM    Specimen: Blood   Result Value Ref Range    Ammonia <10 (L) 11 - 51 umol/L         The patient received:  Medications   labetalol (NORMODYNE,TRANDATE) injection 10 mg (0 mg Intravenous Hold 1/12/25 5095)   sodium chloride 0.9 % flush 10 mL (has no administration in time range)              ED Disposition       ED Disposition   Discharge    Condition   Stable    Comment   --                   Dragon disclaimer:  Part of this note may be an electronic transcription/translation of spoken language to printed text using the Dragon Dictation System.     This information is consistent with my knowledge of the patient’s controlled substance use history.      FINAL IMPRESSION   Diagnosis Plan   1. Hypertensive urgency        2. Thalamic stroke      Left 16 x 12 mm subacute Kuehner infarct            MD Sheldon Bernard Jr, Thomas Mark Jr., MD  01/12/25 1640

## 2025-01-15 ENCOUNTER — TRANSCRIBE ORDERS (OUTPATIENT)
Dept: HOME HEALTH SERVICES | Facility: HOME HEALTHCARE | Age: 70
End: 2025-01-15
Payer: MEDICARE

## 2025-01-15 DIAGNOSIS — I63.9 CEREBROVASCULAR ACCIDENT (CVA), UNSPECIFIED MECHANISM: Primary | ICD-10-CM

## 2025-04-30 ENCOUNTER — TELEPHONE (OUTPATIENT)
Dept: CARDIOLOGY | Facility: CLINIC | Age: 70
End: 2025-04-30
Payer: MEDICARE

## 2025-04-30 NOTE — TELEPHONE ENCOUNTER
"    Preventive Care at the 6-8 Year Visit  Growth Percentiles & Measurements   Weight: 42 lbs 3.2 oz / 19.1 kg (actual weight) / 27 %ile based on CDC 2-20 Years weight-for-age data using vitals from 6/19/2018.   Length: 3' 10\" / 116.8 cm 60 %ile based on CDC 2-20 Years stature-for-age data using vitals from 6/19/2018.   BMI: Body mass index is 14.02 kg/(m^2). 10 %ile based on CDC 2-20 Years BMI-for-age data using vitals from 6/19/2018.   Blood Pressure: Blood pressure percentiles are 13.3 % systolic and 45.8 % diastolic based on the August 2017 AAP Clinical Practice Guideline.    Your child should be seen in 1 year for preventive care.    Development    Your child has more coordination and should be able to tie shoelaces.    Your child may want to participate in new activities at school or join community education activities (such as soccer) or organized groups (such as Girl Scouts).    Set up a routine for talking about school and doing homework.    Limit your child to 1 to 2 hours of quality screen time each day.  Screen time includes television, video game and computer use.  Watch TV with your child and supervise Internet use.    Spend at least 15 minutes a day reading to or reading with your child.    Your child s world is expanding to include school and new friends.  he will start to exert independence.     Diet    Encourage good eating habits.  Lead by example!  Do not make  special  separate meals for him.    Help your child choose fiber-rich fruits, vegetables and whole grains.  Choose and prepare foods and beverages with little added sugars or sweeteners.    Offer your child nutritious snacks such as fruits, vegetables, yogurt, turkey, or cheese.  Remember, snacks are not an essential part of the daily diet and do add to the total calories consumed each day.  Be careful.  Do not overfeed your child.  Avoid foods high in sugar or fat.      Cut up any food that could cause choking.    Your child needs 800 " The patient is being scheduled for several tooth removals with Dr. Logan.  They are requesting holding recommendations for Eliquis.  Please advise.  Thank you.   milligrams (mg) of calcium each day. (One cup of milk has 300 mg calcium.) In addition to milk, cheese and yogurt, dark, leafy green vegetables are good sources of calcium.    Your child needs 10 mg of iron each day. Lean beef, iron-fortified cereal, oatmeal, soybeans, spinach and tofu are good sources of iron.    Your child needs 600 IU/day of vitamin D.  There is a very small amount of vitamin D in food, so most children need a multivitamin or vitamin D supplement.    Let your child help make good choices at the grocery store, help plan and prepare meals, and help clean up.  Always supervise any kitchen activity.    Limit soft drinks and sweetened beverages (including juice) to no more than one small beverage a day. Limit sweets, treats and snack foods (such as chips), fast foods and fried foods.    Exercise    The American Heart Association recommends children get 60 minutes of moderate to vigorous physical activity each day.  This time can be divided into chunks: 30 minutes physical education in school, 10 minutes playing catch, and a 20-minute family walk.    In addition to helping build strong bones and muscles, regular exercise can reduce risks of certain diseases, reduce stress levels, increase self-esteem, help maintain a healthy weight, improve concentration, and help maintain good cholesterol levels.    Be sure your child wears the right safety gear for his or her activities, such as a helmet, mouth guard, knee pads, eye protection or life vest.    Check bicycles and other sports equipment regularly for needed repairs.     Sleep    Help your child get into a sleep routine: washing his or her face, brushing teeth, etc.    Set a regular time to go to bed and wake up at the same time each day. Teach your child to get up when called or when the alarm goes off.    Avoid heavy meals, spicy food and caffeine before bedtime.    Avoid noise and bright rooms.     Avoid computer use and watching TV before  bed.    Your child should not have a TV in his bedroom.    Your child needs 9 to 10 hours of sleep per night.    Safety    Your child needs to be in a car seat or booster seat until he is 4 feet 9 inches (57 inches) tall.  Be sure all other adults and children are buckled as well.    Do not let anyone smoke in your home or around your child.    Practice home fire drills and fire safety.       Supervise your child when he plays outside.  Teach your child what to do if a stranger comes up to him.  Warn your child never to go with a stranger or accept anything from a stranger.  Teach your child to say  NO  and tell an adult he trusts.    Enroll your child in swimming lessons, if appropriate.  Teach your child water safety.  Make sure your child is always supervised whenever around a pool, lake or river.    Teach your child animal safety.       Teach your child how to dial and use 911.       Keep all guns out of your child s reach.  Keep guns and ammunition locked up in different parts of the house.     Self-esteem    Provide support, attention and enthusiasm for your child s abilities, achievements and friends.    Create a schedule of simple chores.       Have a reward system with consistent expectations.  Do not use food as a reward.     Discipline    Time outs are still effective.  A time out is usually 1 minute for each year of age.  If your child needs a time out, set a kitchen timer for 6 minutes.  Place your child in a dull place (such as a hallway or corner of a room).  Make sure the room is free of any potential dangers.  Be sure to look for and praise good behavior shortly after the time out is done.    Always address the behavior.  Do not praise or reprimand with general statements like  You are a good girl  or  You are a naughty boy.   Be specific in your description of the behavior.    Use discipline to teach, not punish.  Be fair and consistent with discipline.     Dental Care    Around age 6, the first of  your child s baby teeth will start to fall out and the adult (permanent) teeth will start to come in.    The first set of molars comes in between ages 5 and 7.  Ask the dentist about sealants (plastic coatings applied on the chewing surfaces of the back molars).    Make regular dental appointments for cleanings and checkups.       Eye Care    Your child s vision is still developing.  If you or your pediatric provider has concerns, make eye checkups at least every 2 years.        ================================================================  [facility]    If you have any questions regarding to your visit please contact your care team:       Team Purple:   Clinic Hours Telephone Number   Dr. Rissa Flores   7am-7pm  Monday - Thursday   7am-5pm  Fridays  [phone]  (Appointment scheduling available 24/7)    Questions about your recent visit?   Team Line:  [phone]   Urgent Care - [place] and [facility] - 11am-9pm Monday-Friday Saturday-Sunday- 9am-5pm   [place] - 5pm-9pm Monday-Friday Saturday-Sunday- 9am-5pm  [phone] - Diana Haas  [phone] - [place]       What options do I have for a visit other than an office visit? We offer electronic visits (e-visits) and telephone visits, when medically appropriate.  Please check with your medical insurance to see if these types of visits are covered, as you will be responsible for any charges that are not paid by your insurance.      You can use Ewireless (secure electronic communication) to access to your chart, send your primary care provider a message, or make an appointment. Ask a team member how to get started.     For a price quote for your services, please call our Consumer Price Line at [phone] or our Imaging Cost estimation line at [phone] (for imaging tests).    Cathie Chin MA

## 2025-05-12 RX ORDER — DAPAGLIFLOZIN 10 MG/1
1 TABLET, FILM COATED ORAL DAILY
Qty: 30 TABLET | Refills: 3 | Status: SHIPPED | OUTPATIENT
Start: 2025-05-12

## 2025-06-26 LAB
MDC_IDC_PG_IMPLANT_DTM: NORMAL
MDC_IDC_PG_MFG: NORMAL
MDC_IDC_PG_MODEL: NORMAL
MDC_IDC_PG_SERIAL: NORMAL
MDC_IDC_PG_TYPE: NORMAL
MDC_IDC_SESS_DTM: NORMAL
MDC_IDC_SESS_TYPE: NORMAL

## 2025-07-01 ENCOUNTER — OFFICE VISIT (OUTPATIENT)
Dept: CARDIOLOGY | Facility: CLINIC | Age: 70
End: 2025-07-01
Payer: MEDICARE

## 2025-07-01 VITALS
OXYGEN SATURATION: 100 % | DIASTOLIC BLOOD PRESSURE: 64 MMHG | HEART RATE: 61 BPM | SYSTOLIC BLOOD PRESSURE: 108 MMHG | HEIGHT: 63 IN | WEIGHT: 239 LBS | BODY MASS INDEX: 42.35 KG/M2

## 2025-07-01 DIAGNOSIS — Z86.73 HISTORY OF STROKE: ICD-10-CM

## 2025-07-01 DIAGNOSIS — I48.0 PAF (PAROXYSMAL ATRIAL FIBRILLATION): ICD-10-CM

## 2025-07-01 DIAGNOSIS — I50.22 CHRONIC SYSTOLIC HEART FAILURE: Chronic | ICD-10-CM

## 2025-07-01 DIAGNOSIS — I42.8 NON-ISCHEMIC CARDIOMYOPATHY: Primary | ICD-10-CM

## 2025-07-01 DIAGNOSIS — I44.7 LBBB (LEFT BUNDLE BRANCH BLOCK): ICD-10-CM

## 2025-07-01 DIAGNOSIS — I10 ESSENTIAL HYPERTENSION: Chronic | ICD-10-CM

## 2025-07-01 RX ORDER — INSULIN LISPRO 100 [IU]/ML
INJECTION, SOLUTION INTRAVENOUS; SUBCUTANEOUS
COMMUNITY

## 2025-07-01 RX ORDER — DULOXETIN HYDROCHLORIDE 20 MG/1
20 CAPSULE, DELAYED RELEASE ORAL DAILY
COMMUNITY
Start: 2025-06-25

## 2025-07-01 RX ORDER — SACUBITRIL AND VALSARTAN 97; 103 MG/1; MG/1
1 TABLET, FILM COATED ORAL 2 TIMES DAILY
Qty: 180 TABLET | Refills: 3 | Status: SHIPPED | OUTPATIENT
Start: 2025-07-01

## 2025-07-01 RX ORDER — PREGABALIN 25 MG/1
25 CAPSULE ORAL 3 TIMES DAILY
COMMUNITY
Start: 2025-01-13

## 2025-07-01 RX ORDER — INSULIN GLARGINE 100 [IU]/ML
INJECTION, SOLUTION SUBCUTANEOUS
COMMUNITY

## 2025-07-01 NOTE — PROGRESS NOTES
Subjective:     Encounter Date: 7/1/2025      Patient ID: Damaris Cazares is a 70 y.o. female.    Chief Complaint: follow up chronic systolic CHF, nonischemic cardiomyopathy, AF, LBBB     History of Present Illness    The patient presents to follow-up regarding her chronic systolic congestive heart failure/nonischemic cardiomyopathy.  Due to significant exertional dyspnea and CT of the coronary arteries suggesting LAD disease she recently underwent cardiac catheterization on 6/6/2024.  This revealed nonobstructive disease, LVEF of 33.7%.  It was noted cardiac MRI would be considered for accurate EF assessment in the future, as well is further assessment of other possible etiologies of her heart failure.  At the time of the cardiac catheterization her Entresto was increased to max dose.  Despite previous recommendations to wean clonidine off she does continue to take clonidine 0.2 mg 3 times daily at the direction of her PCP.  Her blood pressures are very labile with often normal readings in the morning and more acutely elevated readings in the evening hours.    Previous notes indicated she might of had atrial fibrillation in the past but after thorough review of records there was no evidence to confirm this and Eliquis was discontinued by Dr. Sawyer.  Additionally she has had significant blood loss anemia in the past when taking Eliquis for DVT and PE in 2021.    I saw her 6/14 and she was doing about the same since her cardiac catheterization but reported morning blood pressures improved on the higher dose of Entresto.  She reported morning blood pressures were typically 130s but in the evening might reach 170s.  She had been taking Lasix 20 mg most days for the previous 1 week.  Weight was stable.  She was not having chest pain, orthopnea, PND.  She had normal renal function after increasing Entresto.  At that visit I uptitrated her Aldactone to 50 mg and ordered her cardiac MRI.    I saw her in early August.   She had her cardiac MRI completed in July but results were not yet available.  She was doing well on the higher dose of Aldactone and not requiring Lasix.  Blood pressure was 130s to 150s.  She was still having some degree of exertional dyspnea but no chest pain, orthopnea, PND.  After checking follow-up labs I did uptitrate her Coreg to max dose and reduced her clonidine further to 0.1 mg twice daily.    We later received her cardiac MRI results.  See below.  LVEF is 46%.    We later discovered based on loop recorder interrogation that she had 5 episodes of atrial fibrillation on 9/1/2024 and Eliquis was prescribed.    I saw her in October 2024 and she was doing well.  She was not requiring Lasix.  She reported exertional dyspnea that was mild and stable.  She was not having chest pain or orthopnea and weight was stable.  Blood pressure was controlled overall as well.  No changes were made.    More recently, the patient presented to [facility] with altered mental status in early January 2025 and was diagnosed with acute stroke.  She was transferred to [facility].  She did have an echocardiogram that revealed stable LVEF and no evidence of PFO.  According to loop recorder interrogations she had not been having atrial fibrillation.  She is present today for follow-up with an adult male.  Most of the information is obtained from him as she does have some expressive aphasia following her stroke.  When questioned about the source of the stroke, he reports this may have been due to high blood pressure.  He states a month leading up to her stroke her systolic blood pressures were in the 200s.  He suspects she was not taking her medication appropriately.  She is now in speech therapy.  He states she has memory issues now.  He states her blood pressure is now well-controlled.  She has not been requiring any clonidine or Lasix.  She has not been having any chest pain, shortness of breath, orthopnea.  Weight is stable.   She continues to take Eliquis.  She does follow with neurology.  She is needing some dental work.          The following portions of the patient's history were reviewed and updated as appropriate: allergies, current medications, past family history, past medical history, past social history, past surgical history and problem list.    Review of Systems   Constitutional: Negative for malaise/fatigue.   Cardiovascular:  Negative for chest pain, claudication, dyspnea on exertion, leg swelling, near-syncope, orthopnea, palpitations, paroxysmal nocturnal dyspnea and syncope.   Respiratory:  Negative for cough and shortness of breath.    Hematologic/Lymphatic: Does not bruise/bleed easily.   Musculoskeletal:  Negative for falls.   Gastrointestinal:  Negative for bloating.   Neurological:  Negative for dizziness, light-headedness and weakness.        Expressive aphasia        Allergies   Allergen Reactions    Cephalexin Shortness Of Breath    Cheese Unknown - High Severity     BLUE CHEESE: tongue swells    Codeine Shortness Of Breath    Levaquin [Levofloxacin] Anaphylaxis    Morphine Shortness Of Breath    Naproxen Shortness Of Breath    Penicillins Shortness Of Breath    Aspirin Other (See Comments)     Stopped 325 mg due to hemoptysis    Cetirizine Dizziness    Ciprofloxacin Other (See Comments)     Was taking zanaflex at the time and there is a drug interaction    Gabapentin Swelling     BLEs - not face, lips, or tongues    Keppra [Levetiracetam] Other (See Comments)     VISION LOSS    Lisinopril Other (See Comments)     Dry cough    Meloxicam Nausea Only    Vancomycin Other (See Comments)     Vision loss    Sulfa Antibiotics Rash       Current Outpatient Medications:     acetaminophen (TYLENOL) 500 MG tablet, Take 2 tablets by mouth Every 6 (Six) Hours As Needed for Mild Pain. Takes about twice a week, Disp: , Rfl:     apixaban (ELIQUIS) 5 MG tablet tablet, Take 1 tablet by mouth 2 (Two) Times a Day., Disp: 60 tablet,  Rfl: 11    atorvastatin (LIPITOR) 20 MG tablet, Take 1 tablet by mouth Daily., Disp: , Rfl:     carvedilol (COREG) 25 MG tablet, Take 1 tablet by mouth 2 (Two) Times a Day., Disp: 60 tablet, Rfl: 11    cloNIDine (CATAPRES) 0.1 MG tablet, Take 1 tablet by mouth As Needed for High Blood Pressure (blood pressure greater than 180/100)., Disp: 90 tablet, Rfl: 0    diclofenac (VOLTAREN) 75 MG EC tablet, Take 1 tablet by mouth Daily As Needed (joint pain). Has been taking at least once, usually twice a week, Disp: , Rfl:     DULoxetine (CYMBALTA) 20 MG capsule, Take 1 capsule by mouth Daily., Disp: , Rfl:     Farxiga 10 MG tablet, TAKE ONE TABLET BY MOUTH EVERY DAY, Disp: 30 tablet, Rfl: 3    glipizide (GLUCOTROL) 10 MG tablet, Take 1 tablet by mouth 2 (Two) Times a Day Before Meals., Disp: , Rfl:     HumaLOG KwikPen 100 UNIT/ML solution pen-injector, Use sliding scale (patient has copy) before meals. She will use no more than 20 units daily, Disp: , Rfl:     Lantus SoloStar 100 UNIT/ML injection pen, Inject 10 units every day by subcutaneous route at bedtime for 30 days, for type 2 diabetes., Disp: , Rfl:     montelukast (SINGULAIR) 10 MG tablet, Take 1 tablet by mouth Every Night., Disp: , Rfl:     nystatin (MYCOSTATIN) 522369 UNIT/GM powder, Apply 1 Application topically to the appropriate area as directed 4 (Four) Times a Day As Needed., Disp: , Rfl:     pantoprazole (PROTONIX) 40 MG EC tablet, Take 1 tablet by mouth Daily., Disp: , Rfl:     pregabalin (LYRICA) 25 MG capsule, Take 1 capsule by mouth 3 (Three) Times a Day., Disp: , Rfl:     promethazine (PHENERGAN) 25 MG tablet, Take 1 tablet by mouth Every 6 (Six) Hours As Needed for Nausea or Vomiting., Disp: , Rfl:     sacubitril-valsartan (Entresto)  MG tablet, Take 1 tablet by mouth 2 (Two) Times a Day., Disp: 180 tablet, Rfl: 3    spironolactone (ALDACTONE) 50 MG tablet, Take 1 tablet by mouth Daily., Disp: 30 tablet, Rfl: 11    TiZANidine (ZANAFLEX) 4 MG  "capsule, Take 1 capsule by mouth 3 (Three) Times a Day As Needed for Muscle Spasms. Usually takes twice a day, Disp: , Rfl:     traMADol (ULTRAM) 50 MG tablet, Take 1 tablet by mouth 2 (Two) Times a Day As Needed for Moderate Pain., Disp: , Rfl:     vitamin D (ERGOCALCIFEROL) 1.25 MG (82644 UT) capsule capsule, Take 1 capsule by mouth 2 (Two) Times a Week., Disp: , Rfl:     zolpidem (AMBIEN) 10 MG tablet, Take 1 tablet by mouth every night at bedtime. EVERY NIGHT, Disp: , Rfl: 1    furosemide (LASIX) 20 MG tablet, TAKE 1 TABLET BY MOUTH DAILY AS NEEDED (SWELLING)., Disp: 90 tablet, Rfl: 1         Objective:    /64   Pulse 61   Ht 160 cm (63\")   Wt 108 kg (239 lb)   SpO2 100%   BMI 42.34 kg/m²        Vitals and nursing note reviewed.   Constitutional:       General: Not in acute distress.     Appearance: Well-developed and not in distress. Not diaphoretic.   Neck:      Vascular: No JVD.   Pulmonary:      Effort: Pulmonary effort is normal. No respiratory distress.      Breath sounds: Normal breath sounds.   Cardiovascular:      Normal rate. Regular rhythm.      Murmurs: There is no murmur.   Edema:     Peripheral edema absent.   Abdominal:      Tenderness: There is no abdominal tenderness.   Skin:     General: Skin is warm and dry.   Neurological:      Mental Status: Alert and oriented to person, place, and time.         Lab Review:   Lab Results   Component Value Date    GLUCOSE 144 (H) 01/12/2025    BUN 32 (H) 01/12/2025    CREATININE 0.94 01/12/2025    EGFR 66.6 01/12/2025    BCR 34.0 (H) 01/12/2025    K 4.6 01/12/2025    CO2 24.0 01/12/2025    CALCIUM 8.9 01/12/2025    ALBUMIN 3.9 01/12/2025    BILITOT 0.2 01/12/2025    AST 13 01/12/2025    ALT 14 01/12/2025                ECG 12 Lead    Date/Time: 7/1/2025 12:31 PM  Performed by: Maranda Starks APRN    Authorized by: Maranda Starks APRN  Comparison: compared with previous ECG from 1/6/2025  Similar to previous ECG  Comparison to previous ECG: Lateral " T wave inversions on today's EKG  Rhythm: sinus rhythm  Ectopy: atrial premature contractions  BPM: 61  Conduction: left bundle branch block    Clinical impression: abnormal EKG          Results for orders placed during the hospital encounter of 03/13/24    Adult Transthoracic Echo Complete w/ Color, Spectral and Contrast if necessary per protocol 03/21/2024  4:23 PM    Interpretation Summary    Left ventricular systolic function is moderately decreased.  Left ventricular ejection fraction appears to be 36 - 40%.    The following left ventricular wall segments are hypokinetic: apical inferior, mid inferior, apical septal, basal inferoseptal, mid inferoseptal, mid anteroseptal, basal inferior and basal inferoseptal.    Left ventricular diastolic function is consistent with (grade III w/high LAP) reversible restrictive pattern.    The left atrial cavity is severely dilated.    The right atrial cavity is mildly  dilated.    Estimated right ventricular systolic pressure from tricuspid regurgitation is normal (<35 mmHg).    Normal size and mildly reduced function of the right ventricle.    No significant (greater than mild) valvular pathology.    No prior TTEs available for comparison.    6/6/2024 cath report:    Impression:  1. Very mild atherosclerotic coronary disease, with no stenosis of a calcified proximal LAD. Otherwise, there is no calcified segment noted in all 3 coronary vessels are angiographically normal.   2. Dilated, nonischemic cardiomyopathy with LV end-diastolic diameter measured at 7 cm, with severe global hypokinesis and LVEF calculated at 33.7%  3. Elevated LVEDP at 24 mmHg  4. Poorly controlled systemic hypertension       Plan:   1. TR band off in 2 hours then can discharge home  2. Continue aspirin and statin  3. Continue further aggressive medical titration for nonischemic cardiomyopathy; will increase moderate to high intensity Entresto dose today. Continue current dose of Coreg (12.5 mg twice  daily in the form of two 6.25mg tablets in AM and two in PM), spironolactone 25 g daily, and Farxiga 10 mg daily. Has Lasix available for as needed use.  4. Will add BNP to this morning's labs, and have patient follow-up as planned in a few weeks to reassess any changes after increasing Entresto. I have ordered repeat labs (BMP and BNP) to be drawn that day prior to the visit.  5. As we continue aggressive titration of medical therapy, may also consider sending her then for a cardiac MRI to better understand any other potential etiology and more precisely calculate EF (and if <35%, then discuss ICD - likely BiV given LBBB)    Joseph Sawyer MD               Assessment:      Problem List Items Addressed This Visit          Cardiac and Vasculature    Essential hypertension (Chronic)    Chronic systolic heart failure (Chronic)    Overview   EF 27% 2017 nuclear stress  EF abnormal stress 2021, reportedly normal cath  EF 40%, severe SHANON, moderate LAE, moderate to severe RVE, mild MR, severe TR (RVSP 45 mm Hg), mild PI, physiologic pericardial effusion 3/2021 echo, Dr. Jasso, [facility]  EF 60-65% 6/2023 echo, Dr. Hernandez, [facility]  EF 46-52% 10/2023 echo, Dr. Azevedo, [facility]    Cardiac MRI ([place]) 7/19/24 - LVEF 46% with global dysfunction and septal motion c/w conduction disorder, mild asymmetric LVH.  Mid-wall LGE from bas-mid anterior and inferior -> non-ischemic pattern seen in systemic hypertension, pulmonary hypertension, or RV pressure overload.  Subendo LGE of basal-mid anterolateral wall segments c/w infarction but viable (25-50% thickness LGE).  RVEF 47%.             Relevant Medications    sacubitril-valsartan (Entresto)  MG tablet    LBBB (left bundle branch block)    PAF (paroxysmal atrial fibrillation)    Relevant Medications    sacubitril-valsartan (Entresto)  MG tablet    Non-ischemic cardiomyopathy - Primary    Relevant Medications    sacubitril-valsartan (Entresto)  MG tablet       Neuro     History of stroke       Plan:     1.  Chronic systolic congestive heart failure: Established problem, stable.  Euvolemic NYHA class I-II.  LVEF 46% by cardiac MRI, 40 to 45% per most recent echo at [facility].    -Continue Entresto 97/103 mg twice daily  -Continue Coreg 25 mg twice daily  -Continue Farxiga 10 mg daily  -Continue Aldactone 50 mg daily  -Lasix 20 mg on an as-needed basis for weight gain of greater than 2 pounds overnight or greater than 4 pounds in 2 days or shortness of breath/orthopnea/PND-has not been requiring  -Heart healthy low-sodium diet  -Daily weights    2.  Hypertension: Improved/well-controlled.  The patient's significant other states there is concern she was not taking her medication appropriately leading up to her stroke in January.  He states he now administers her medications to her to ensure compliance.    3.  Left bundle branch block: Chronic.    4.  Paroxysmal atrial fibrillation: She had 5 episodes which were rate controlled on 9/1/2024, per loop recorder interrogation.  Longest episode was approximately 1 hour 20 minutes.  She did not have any obvious symptoms at that time.  She is maintaining sinus rhythm.  According to more recent loop recorder interrogations reviewed today, no evidence of recurrent atrial fibrillation.  Continue Eliquis for stroke prophylaxis, though it appears her recent stroke culprit was not the atrial fibrillation and more likely her elevated blood pressure.    She is requesting to hold Eliquis for dental work.  From a cardiology standpoint, okay to hold Eliquis for 48 hours prior and resume when felt safe to do so afterward.  However, prior to doing this I would also recommend consulting with her neurologist.  Given her recent stroke she may require antiplatelet therapy on the days that she is without her Eliquis.    Follow-up 3-6 months with Dr. Sawyer, call sooner with symptoms or concerns

## 2025-07-09 RX ORDER — SPIRONOLACTONE 50 MG/1
50 TABLET, FILM COATED ORAL DAILY
Qty: 30 TABLET | Refills: 11 | Status: SHIPPED | OUTPATIENT
Start: 2025-07-09

## 2025-07-09 RX ORDER — CARVEDILOL 25 MG/1
25 TABLET ORAL 2 TIMES DAILY
Qty: 60 TABLET | Refills: 11 | Status: SHIPPED | OUTPATIENT
Start: 2025-07-09

## 2025-07-16 LAB
MC_CV_MDC_IDC_RATE_1: 162
MC_CV_MDC_IDC_RATE_1: 3
MC_CV_MDC_IDC_RATE_1: 40
MC_CV_MDC_IDC_ZONE_ID: 1
MC_CV_MDC_IDC_ZONE_ID: 2
MC_CV_MDC_IDC_ZONE_ID: 3
MC_CV_MDC_IDC_ZONE_ID: 4
MC_CV_MDC_IDC_ZONE_ID: 7
MDC_IDC_MSMT_BATTERY_STATUS: NORMAL
MDC_IDC_PG_IMPLANT_DTM: NORMAL
MDC_IDC_PG_MFG: NORMAL
MDC_IDC_PG_MODEL: NORMAL
MDC_IDC_PG_SERIAL: NORMAL
MDC_IDC_PG_TYPE: NORMAL
MDC_IDC_SESS_DTM: NORMAL
MDC_IDC_SESS_TYPE: NORMAL
MDC_IDC_SET_ZONE_DETECTION_DETAILS: 16
MDC_IDC_SET_ZONE_DETECTION_DETAILS: 4
MDC_IDC_SET_ZONE_STATUS: NORMAL
MDC_IDC_SET_ZONE_TYPE: NORMAL

## 2025-08-01 ENCOUNTER — TELEPHONE (OUTPATIENT)
Dept: CARDIOLOGY | Facility: CLINIC | Age: 70
End: 2025-08-01
Payer: MEDICARE

## 2025-08-01 NOTE — TELEPHONE ENCOUNTER
[facility] received a fax that requires your attention. The document has been indexed to the patient’s chart for your review.      Reason for sending: EXTERNAL MEDICAL RECORD NOTIFICATION     Documents Description: [facility]-7.31.25    Name of Sender: [facility]     Date Indexed: 7.31.25

## 2025-08-07 RX ORDER — APIXABAN 5 MG/1
5 TABLET, FILM COATED ORAL EVERY 12 HOURS SCHEDULED
Qty: 60 TABLET | Refills: 11 | Status: SHIPPED | OUTPATIENT
Start: 2025-08-07

## 2025-08-11 ENCOUNTER — TELEPHONE (OUTPATIENT)
Dept: CARDIOLOGY | Facility: CLINIC | Age: 70
End: 2025-08-11
Payer: MEDICARE

## 2025-08-26 LAB
MC_CV_MDC_IDC_RATE_1: 162
MC_CV_MDC_IDC_RATE_1: 162
MC_CV_MDC_IDC_RATE_1: 3
MC_CV_MDC_IDC_RATE_1: 3
MC_CV_MDC_IDC_RATE_1: 40
MC_CV_MDC_IDC_RATE_1: 40
MC_CV_MDC_IDC_ZONE_ID: 1
MC_CV_MDC_IDC_ZONE_ID: 1
MC_CV_MDC_IDC_ZONE_ID: 2
MC_CV_MDC_IDC_ZONE_ID: 2
MC_CV_MDC_IDC_ZONE_ID: 3
MC_CV_MDC_IDC_ZONE_ID: 3
MC_CV_MDC_IDC_ZONE_ID: 4
MC_CV_MDC_IDC_ZONE_ID: 4
MC_CV_MDC_IDC_ZONE_ID: 7
MC_CV_MDC_IDC_ZONE_ID: 7
MDC_IDC_MSMT_BATTERY_STATUS: NORMAL
MDC_IDC_MSMT_BATTERY_STATUS: NORMAL
MDC_IDC_PG_IMPLANT_DTM: NORMAL
MDC_IDC_PG_IMPLANT_DTM: NORMAL
MDC_IDC_PG_MFG: NORMAL
MDC_IDC_PG_MFG: NORMAL
MDC_IDC_PG_MODEL: NORMAL
MDC_IDC_PG_MODEL: NORMAL
MDC_IDC_PG_SERIAL: NORMAL
MDC_IDC_PG_SERIAL: NORMAL
MDC_IDC_PG_TYPE: NORMAL
MDC_IDC_PG_TYPE: NORMAL
MDC_IDC_SESS_DTM: NORMAL
MDC_IDC_SESS_DTM: NORMAL
MDC_IDC_SESS_TYPE: NORMAL
MDC_IDC_SESS_TYPE: NORMAL
MDC_IDC_SET_ZONE_DETECTION_DETAILS: 16
MDC_IDC_SET_ZONE_DETECTION_DETAILS: 16
MDC_IDC_SET_ZONE_DETECTION_DETAILS: 4
MDC_IDC_SET_ZONE_DETECTION_DETAILS: 4
MDC_IDC_SET_ZONE_STATUS: NORMAL
MDC_IDC_SET_ZONE_TYPE: NORMAL

## (undated) DEVICE — TR BAND RADIAL ARTERY COMPRESSION DEVICE: Brand: TR BAND

## (undated) DEVICE — MODEL BT2000 P/N 700287-012KIT CONTENTS: MANIFOLD WITH SALINE AND CONTRAST PORTS, SALINE TUBING WITH SPIKE AND HAND SYRINGE, TRANSDUCER: Brand: BT2000 AUTOMATED MANIFOLD KIT

## (undated) DEVICE — A2000 MULTI-USE SYRINGE KIT, P/N 701277-003KIT CONTENTS: 100ML CONTRAST RESERVOIR AND TUBING WITH CONTRAST SPIKE AND CLAMP: Brand: A2000 MULTI-USE SYRINGE KIT

## (undated) DEVICE — SUP ARMBRD ART/LINE BLU

## (undated) DEVICE — Device

## (undated) DEVICE — PK CATH CARD 30 CA/4

## (undated) DEVICE — SOL IRR NACL 0.9PCT BT 1000ML

## (undated) DEVICE — PAD, DEFIB, ADULT, RADIOTRANS, PHYSIO: Brand: MEDLINE

## (undated) DEVICE — GLIDESHEATH SLENDER STAINLESS STEEL KIT: Brand: GLIDESHEATH SLENDER

## (undated) DEVICE — CANN NASL ETCO2 LO/FLO A/

## (undated) DEVICE — GW STARTER FXD CORE J .035 3X260CM 3MM

## (undated) DEVICE — DRSNG SURESITE WNDW 4X4.5

## (undated) DEVICE — Device: Brand: MEDEX

## (undated) DEVICE — CATH F6INF PIG 145 110CM 6SH: Brand: INFINITI

## (undated) DEVICE — SOLIDIFIER LIQUI LOC PLUS 2000CC

## (undated) DEVICE — RADIFOCUS OPTITORQUE ANGIOGRAPHIC CATHETER: Brand: OPTITORQUE